# Patient Record
Sex: FEMALE | Race: WHITE | NOT HISPANIC OR LATINO | Employment: OTHER | URBAN - METROPOLITAN AREA
[De-identification: names, ages, dates, MRNs, and addresses within clinical notes are randomized per-mention and may not be internally consistent; named-entity substitution may affect disease eponyms.]

---

## 2018-01-09 ENCOUNTER — OFFICE VISIT (OUTPATIENT)
Dept: INTERNAL MEDICINE | Facility: CLINIC | Age: 43
End: 2018-01-09
Payer: MEDICARE

## 2018-01-09 VITALS
BODY MASS INDEX: 33.68 KG/M2 | HEART RATE: 96 BPM | DIASTOLIC BLOOD PRESSURE: 88 MMHG | OXYGEN SATURATION: 98 % | WEIGHT: 183 LBS | TEMPERATURE: 99 F | SYSTOLIC BLOOD PRESSURE: 120 MMHG | HEIGHT: 62 IN

## 2018-01-09 DIAGNOSIS — J10.1 INFLUENZA A: Primary | ICD-10-CM

## 2018-01-09 DIAGNOSIS — R68.89 FLU-LIKE SYMPTOMS: ICD-10-CM

## 2018-01-09 LAB
CTP QC/QA: YES
FLUAV AG NPH QL: POSITIVE
FLUBV AG NPH QL: NEGATIVE

## 2018-01-09 PROCEDURE — 87804 INFLUENZA ASSAY W/OPTIC: CPT | Mod: QW,S$GLB,, | Performed by: NURSE PRACTITIONER

## 2018-01-09 PROCEDURE — 99213 OFFICE O/P EST LOW 20 MIN: CPT | Mod: S$GLB,,, | Performed by: NURSE PRACTITIONER

## 2018-01-09 PROCEDURE — 99999 PR PBB SHADOW E&M-EST. PATIENT-LVL III: CPT | Mod: PBBFAC,,, | Performed by: NURSE PRACTITIONER

## 2018-01-09 RX ORDER — OSELTAMIVIR PHOSPHATE 75 MG/1
75 CAPSULE ORAL 2 TIMES DAILY
Qty: 10 CAPSULE | Refills: 0 | Status: SHIPPED | OUTPATIENT
Start: 2018-01-09 | End: 2018-01-14

## 2018-01-09 RX ORDER — BENZONATATE 100 MG/1
100 CAPSULE ORAL 3 TIMES DAILY PRN
Qty: 30 CAPSULE | Refills: 0 | Status: SHIPPED | OUTPATIENT
Start: 2018-01-09 | End: 2018-01-19

## 2018-01-09 NOTE — PROGRESS NOTES
INTERNAL MEDICINE PROGRESS/URGENT CARE NOTE    CHIEF COMPLAINT     Chief Complaint   Patient presents with    Sore Throat     all s/s began yesterday     Chest Congestion    Nasal Congestion    Generalized Body Aches    Chills    Cough       HPI     Jody Loza is a 42 y.o. female who presents for an urgent visit today.  She is an established pt of Dr. Judge     Here with c/o sore throat, nasal congestion, chills, body aches and cough starting yesterday   +ear pain  +sneezing  +cough  Reports sick contacts.     Treating with theraflu and other OTC meds.     Past Medical History:  Past Medical History:   Diagnosis Date    H/O albinism     Vision impairment     blindness/albinism        Home Medications:  Prior to Admission medications    Medication Sig Start Date End Date Taking? Authorizing Provider   sulindac (CLINORIL) 150 MG tablet Take 1 tablet (150 mg total) by mouth 2 (two) times daily. 9/1/15   Rai Jack DNP       Review of Systems:  Review of Systems   Constitutional: Positive for chills, fatigue and fever (subjective ).   HENT: Positive for congestion, ear pain, sinus pain, sinus pressure, sneezing and sore throat.    Respiratory: Positive for cough and wheezing. Negative for shortness of breath.    Cardiovascular: Negative for chest pain and palpitations.   Gastrointestinal: Positive for vomiting (1 episode this morning ). Negative for abdominal pain, constipation, diarrhea and nausea.   Musculoskeletal: Positive for arthralgias and myalgias.   Skin: Negative for rash.   Neurological: Negative for dizziness, light-headedness and headaches.       Health Maintainence:   Immunizations:  Health Maintenance       Date Due Completion Date    Pap Smear with HPV Cotest 07/28/1996 ---    Mammogram 07/28/2015 ---    Influenza Vaccine 08/01/2017 ---    TETANUS VACCINE 07/30/2024 7/30/2014           PHYSICAL EXAM     /88 (BP Location: Right arm, Patient Position: Sitting, BP Method: Large  "(Manual))   Pulse 96   Temp 98.8 °F (37.1 °C) (Oral)   Ht 5' 2" (1.575 m)   Wt 83 kg (182 lb 15.7 oz)   LMP 11/21/2017 (Approximate)   SpO2 98%   BMI 33.47 kg/m²     Physical Exam   Constitutional: She is oriented to person, place, and time. She appears well-developed and well-nourished.   HENT:   Head: Normocephalic.   Right Ear: Tympanic membrane and external ear normal.   Left Ear: External ear normal. Tympanic membrane is injected and bulging.   Nose: Mucosal edema and rhinorrhea present.   Mouth/Throat: Posterior oropharyngeal erythema present. No oropharyngeal exudate.   Eyes: Pupils are equal, round, and reactive to light.   Neck: Neck supple. No JVD present. No tracheal deviation present. No thyromegaly present.   Cardiovascular: Normal rate, regular rhythm, normal heart sounds and intact distal pulses.  Exam reveals no gallop and no friction rub.    No murmur heard.  Pulmonary/Chest: Effort normal and breath sounds normal. No respiratory distress. She has no wheezes. She has no rales.   Abdominal: Soft. Bowel sounds are normal. She exhibits no distension. There is no tenderness.   Musculoskeletal: Normal range of motion. She exhibits no edema or tenderness.   Lymphadenopathy:     She has no cervical adenopathy.   Neurological: She is alert and oriented to person, place, and time.   Skin: Skin is warm and dry. No rash noted.   Psychiatric: She has a normal mood and affect. Her behavior is normal.   Vitals reviewed.      LABS     No results found for: LABA1C, HGBA1C  CMP  Sodium   Date Value Ref Range Status   07/31/2014 138 136 - 145 mmol/L Final     Potassium   Date Value Ref Range Status   07/31/2014 4.3 3.5 - 5.1 mmol/L Final     Chloride   Date Value Ref Range Status   07/31/2014 107 95 - 110 mmol/L Final     CO2   Date Value Ref Range Status   07/31/2014 23 23 - 29 mmol/L Final     Glucose   Date Value Ref Range Status   07/31/2014 92 70 - 110 mg/dL Final     BUN, Bld   Date Value Ref Range " Status   07/31/2014 12 6 - 20 mg/dL Final     Creatinine   Date Value Ref Range Status   07/31/2014 0.8 0.5 - 1.4 mg/dL Final     Calcium   Date Value Ref Range Status   07/31/2014 9.0 8.7 - 10.5 mg/dL Final     Total Protein   Date Value Ref Range Status   07/31/2014 7.3 6.0 - 8.4 g/dL Final     Albumin   Date Value Ref Range Status   07/31/2014 3.9 3.5 - 5.2 g/dL Final     Total Bilirubin   Date Value Ref Range Status   07/31/2014 0.4 0.1 - 1.0 mg/dL Final     Comment:     For infants and newborns, interpretation of results should be based  on gestational age, weight and in agreement with clinical  observations.  Premature Infant recommended reference ranges:  Up to 24 hours.............<8.0 mg/dL  Up to 48 hours............<12.0 mg/dL  3-5 days..................<15.0 mg/dL  6-29 days.................<15.0 mg/dL       Alkaline Phosphatase   Date Value Ref Range Status   07/31/2014 48 (L) 55 - 135 U/L Final     AST   Date Value Ref Range Status   07/31/2014 16 10 - 40 U/L Final     ALT   Date Value Ref Range Status   07/31/2014 11 10 - 44 U/L Final     Anion Gap   Date Value Ref Range Status   07/31/2014 8 8 - 16 mmol/L Final     eGFR if    Date Value Ref Range Status   07/31/2014 >60.0 >60 mL/min/1.73 m^2 Final     eGFR if non    Date Value Ref Range Status   07/31/2014 >60.0 >60 mL/min/1.73 m^2 Final     Comment:     Calculation used to obtain the estimated glomerular filtration  rate (eGFR) is the CKD-EPI equation. Since race is unknown   in our information system, the eGFR values for   -American and Non--American patients are given   for each creatinine result.       Lab Results   Component Value Date    WBC 7.98 07/31/2014    HGB 13.4 07/31/2014    HCT 40.5 07/31/2014    MCV 92 07/31/2014     07/31/2014     Lab Results   Component Value Date    CHOL 159 07/31/2014     Lab Results   Component Value Date    HDL 51 07/31/2014     Lab Results   Component Value  Date    LDLCALC 93.4 07/31/2014     Lab Results   Component Value Date    TRIG 73 07/31/2014     Lab Results   Component Value Date    CHOLHDL 32.1 07/31/2014     Lab Results   Component Value Date    TSH 3.899 10/13/2014       ASSESSMENT/PLAN     Jody Loza is a 42 y.o. female with  Past Medical History:   Diagnosis Date    H/O albinism     Vision impairment     blindness/albinism      Influenza A- poct flu A+. Will start tamiflu bid x 5 days. Cough meds as needed.   -     oseltamivir (TAMIFLU) 75 MG capsule; Take 1 capsule (75 mg total) by mouth 2 (two) times daily.  Dispense: 10 capsule; Refill: 0  -     benzonatate (TESSALON) 100 MG capsule; Take 1 capsule (100 mg total) by mouth 3 (three) times daily as needed.  Dispense: 30 capsule; Refill: 0    Flu-like symptoms  -     POCT Influenza A/B          Follow up as needed     Patient education provided from [facility]. Patient was counseled on when and how to seek emergent care.       Allyssa GOODE, LEIDA, FNP-c   Department of Internal Medicine - [facility]  4:46 PM

## 2018-03-03 ENCOUNTER — HOSPITAL ENCOUNTER (INPATIENT)
Facility: HOSPITAL | Age: 43
LOS: 3 days | Discharge: HOME | DRG: 378 | End: 2018-03-06
Attending: EMERGENCY MEDICINE | Admitting: HOSPITALIST
Payer: MEDICARE

## 2018-03-03 DIAGNOSIS — R11.10 EMESIS: ICD-10-CM

## 2018-03-03 DIAGNOSIS — K92.2 ACUTE UPPER GI BLEED: Primary | ICD-10-CM

## 2018-03-03 DIAGNOSIS — D64.9 SYMPTOMATIC ANEMIA: ICD-10-CM

## 2018-03-03 DIAGNOSIS — R55 SYNCOPE, UNSPECIFIED SYNCOPE TYPE: ICD-10-CM

## 2018-03-03 PROBLEM — D62 ANEMIA DUE TO ACUTE BLOOD LOSS: Status: ACTIVE | Noted: 2018-03-03

## 2018-03-03 LAB
ABO + RH BLD: NORMAL
ALBUMIN SERPL BCP-MCNC: 3.2 G/DL
ALP SERPL-CCNC: 41 U/L
ALT SERPL W/O P-5'-P-CCNC: 11 U/L
ANION GAP SERPL CALC-SCNC: 8 MMOL/L
AST SERPL-CCNC: 12 U/L
B-HCG UR QL: NEGATIVE
BACTERIA #/AREA URNS HPF: NORMAL /HPF
BASOPHILS # BLD AUTO: 0.02 K/UL
BASOPHILS NFR BLD: 0.2 %
BILIRUB SERPL-MCNC: 0.4 MG/DL
BILIRUB UR QL STRIP: NEGATIVE
BLD GP AB SCN CELLS X3 SERPL QL: NORMAL
BUN SERPL-MCNC: 33 MG/DL
CALCIUM SERPL-MCNC: 8.1 MG/DL
CHLORIDE SERPL-SCNC: 108 MMOL/L
CLARITY UR: CLEAR
CO2 SERPL-SCNC: 24 MMOL/L
COLOR UR: YELLOW
CREAT SERPL-MCNC: 0.8 MG/DL
CTP QC/QA: YES
DIFFERENTIAL METHOD: ABNORMAL
EOSINOPHIL # BLD AUTO: 0.1 K/UL
EOSINOPHIL NFR BLD: 0.4 %
ERYTHROCYTE [DISTWIDTH] IN BLOOD BY AUTOMATED COUNT: 12 %
EST. GFR  (AFRICAN AMERICAN): >60 ML/MIN/1.73 M^2
EST. GFR  (NON AFRICAN AMERICAN): >60 ML/MIN/1.73 M^2
GLUCOSE SERPL-MCNC: 171 MG/DL
GLUCOSE UR QL STRIP: NEGATIVE
HCT VFR BLD AUTO: 20.4 %
HCT VFR BLD AUTO: 25.8 %
HCT VFR BLD AUTO: 28.5 %
HGB BLD-MCNC: 7.2 G/DL
HGB BLD-MCNC: 8.9 G/DL
HGB BLD-MCNC: 9.7 G/DL
HGB UR QL STRIP: ABNORMAL
INR PPP: 1.1
KETONES UR QL STRIP: NEGATIVE
LACTATE SERPL-SCNC: 2.4 MMOL/L
LEUKOCYTE ESTERASE UR QL STRIP: NEGATIVE
LIPASE SERPL-CCNC: 39 U/L
LYMPHOCYTES # BLD AUTO: 2 K/UL
LYMPHOCYTES NFR BLD: 17.8 %
MCH RBC QN AUTO: 30.7 PG
MCHC RBC AUTO-ENTMCNC: 34 G/DL
MCV RBC AUTO: 90 FL
MICROSCOPIC COMMENT: NORMAL
MONOCYTES # BLD AUTO: 0.6 K/UL
MONOCYTES NFR BLD: 5.1 %
NEUTROPHILS # BLD AUTO: 8.5 K/UL
NEUTROPHILS NFR BLD: 76.3 %
NITRITE UR QL STRIP: NEGATIVE
PH UR STRIP: 7 [PH] (ref 5–8)
PLATELET # BLD AUTO: 313 K/UL
PMV BLD AUTO: 9 FL
POCT GLUCOSE: 173 MG/DL (ref 70–110)
POTASSIUM SERPL-SCNC: 4.3 MMOL/L
PROT SERPL-MCNC: 5.8 G/DL
PROT UR QL STRIP: NEGATIVE
PROTHROMBIN TIME: 11.7 SEC
RBC # BLD AUTO: 3.16 M/UL
RBC #/AREA URNS HPF: 3 /HPF (ref 0–4)
SODIUM SERPL-SCNC: 140 MMOL/L
SP GR UR STRIP: 1.01 (ref 1–1.03)
SQUAMOUS #/AREA URNS HPF: 5 /HPF
TROPONIN I SERPL DL<=0.01 NG/ML-MCNC: <0.006 NG/ML
URN SPEC COLLECT METH UR: ABNORMAL
UROBILINOGEN UR STRIP-ACNC: NEGATIVE EU/DL
WBC # BLD AUTO: 11.19 K/UL
WBC #/AREA URNS HPF: 4 /HPF (ref 0–5)

## 2018-03-03 PROCEDURE — 25000003 PHARM REV CODE 250: Performed by: NURSE PRACTITIONER

## 2018-03-03 PROCEDURE — 81000 URINALYSIS NONAUTO W/SCOPE: CPT

## 2018-03-03 PROCEDURE — 63600175 PHARM REV CODE 636 W HCPCS: Performed by: EMERGENCY MEDICINE

## 2018-03-03 PROCEDURE — 25000003 PHARM REV CODE 250: Performed by: EMERGENCY MEDICINE

## 2018-03-03 PROCEDURE — C9113 INJ PANTOPRAZOLE SODIUM, VIA: HCPCS | Performed by: EMERGENCY MEDICINE

## 2018-03-03 PROCEDURE — 85025 COMPLETE CBC W/AUTO DIFF WBC: CPT

## 2018-03-03 PROCEDURE — 86850 RBC ANTIBODY SCREEN: CPT

## 2018-03-03 PROCEDURE — 83690 ASSAY OF LIPASE: CPT

## 2018-03-03 PROCEDURE — 21400001 HC TELEMETRY ROOM

## 2018-03-03 PROCEDURE — S0028 INJECTION, FAMOTIDINE, 20 MG: HCPCS | Performed by: EMERGENCY MEDICINE

## 2018-03-03 PROCEDURE — 25500020 PHARM REV CODE 255: Performed by: EMERGENCY MEDICINE

## 2018-03-03 PROCEDURE — 96361 HYDRATE IV INFUSION ADD-ON: CPT

## 2018-03-03 PROCEDURE — 36415 COLL VENOUS BLD VENIPUNCTURE: CPT

## 2018-03-03 PROCEDURE — 99285 EMERGENCY DEPT VISIT HI MDM: CPT | Mod: 25

## 2018-03-03 PROCEDURE — 80053 COMPREHEN METABOLIC PANEL: CPT

## 2018-03-03 PROCEDURE — 85018 HEMOGLOBIN: CPT | Mod: 91

## 2018-03-03 PROCEDURE — 25000003 PHARM REV CODE 250: Performed by: INTERNAL MEDICINE

## 2018-03-03 PROCEDURE — 93005 ELECTROCARDIOGRAM TRACING: CPT

## 2018-03-03 PROCEDURE — 96374 THER/PROPH/DIAG INJ IV PUSH: CPT

## 2018-03-03 PROCEDURE — 81025 URINE PREGNANCY TEST: CPT | Performed by: EMERGENCY MEDICINE

## 2018-03-03 PROCEDURE — 85610 PROTHROMBIN TIME: CPT

## 2018-03-03 PROCEDURE — 84484 ASSAY OF TROPONIN QUANT: CPT

## 2018-03-03 PROCEDURE — 83605 ASSAY OF LACTIC ACID: CPT

## 2018-03-03 PROCEDURE — 96375 TX/PRO/DX INJ NEW DRUG ADDON: CPT

## 2018-03-03 PROCEDURE — 85014 HEMATOCRIT: CPT

## 2018-03-03 PROCEDURE — 82962 GLUCOSE BLOOD TEST: CPT

## 2018-03-03 PROCEDURE — 86920 COMPATIBILITY TEST SPIN: CPT

## 2018-03-03 PROCEDURE — C9113 INJ PANTOPRAZOLE SODIUM, VIA: HCPCS | Performed by: INTERNAL MEDICINE

## 2018-03-03 PROCEDURE — 96376 TX/PRO/DX INJ SAME DRUG ADON: CPT

## 2018-03-03 PROCEDURE — 63600175 PHARM REV CODE 636 W HCPCS: Performed by: INTERNAL MEDICINE

## 2018-03-03 PROCEDURE — 93010 ELECTROCARDIOGRAM REPORT: CPT | Mod: ,,, | Performed by: INTERNAL MEDICINE

## 2018-03-03 RX ORDER — FAMOTIDINE 10 MG/ML
40 INJECTION INTRAVENOUS
Status: COMPLETED | OUTPATIENT
Start: 2018-03-03 | End: 2018-03-03

## 2018-03-03 RX ORDER — ONDANSETRON 2 MG/ML
4 INJECTION INTRAMUSCULAR; INTRAVENOUS EVERY 8 HOURS PRN
Status: DISCONTINUED | OUTPATIENT
Start: 2018-03-03 | End: 2018-03-06 | Stop reason: HOSPADM

## 2018-03-03 RX ORDER — PANTOPRAZOLE SODIUM 40 MG/10ML
80 INJECTION, POWDER, LYOPHILIZED, FOR SOLUTION INTRAVENOUS
Status: COMPLETED | OUTPATIENT
Start: 2018-03-03 | End: 2018-03-03

## 2018-03-03 RX ORDER — SODIUM CHLORIDE 9 MG/ML
INJECTION, SOLUTION INTRAVENOUS CONTINUOUS
Status: DISCONTINUED | OUTPATIENT
Start: 2018-03-03 | End: 2018-03-05

## 2018-03-03 RX ORDER — ONDANSETRON 2 MG/ML
4 INJECTION INTRAMUSCULAR; INTRAVENOUS
Status: COMPLETED | OUTPATIENT
Start: 2018-03-03 | End: 2018-03-03

## 2018-03-03 RX ORDER — BUTALBITAL, ACETAMINOPHEN AND CAFFEINE 50; 325; 40 MG/1; MG/1; MG/1
1 TABLET ORAL EVERY 4 HOURS PRN
Status: DISCONTINUED | OUTPATIENT
Start: 2018-03-03 | End: 2018-03-06 | Stop reason: HOSPADM

## 2018-03-03 RX ORDER — SODIUM CHLORIDE 0.9 % (FLUSH) 0.9 %
3 SYRINGE (ML) INJECTION
Status: DISCONTINUED | OUTPATIENT
Start: 2018-03-03 | End: 2018-03-06 | Stop reason: HOSPADM

## 2018-03-03 RX ORDER — SODIUM CHLORIDE 9 MG/ML
INJECTION, SOLUTION INTRAVENOUS CONTINUOUS
Status: DISCONTINUED | OUTPATIENT
Start: 2018-03-03 | End: 2018-03-03

## 2018-03-03 RX ADMIN — SODIUM CHLORIDE: 0.9 INJECTION, SOLUTION INTRAVENOUS at 02:03

## 2018-03-03 RX ADMIN — SODIUM CHLORIDE 1000 ML: 0.9 INJECTION, SOLUTION INTRAVENOUS at 09:03

## 2018-03-03 RX ADMIN — DEXTROSE 8 MG/HR: 50 INJECTION, SOLUTION INTRAVENOUS at 05:03

## 2018-03-03 RX ADMIN — BUTALBITAL, ACETAMINOPHEN AND CAFFEINE 1 TABLET: 50; 325; 40 TABLET ORAL at 05:03

## 2018-03-03 RX ADMIN — IOHEXOL 75 ML: 350 INJECTION, SOLUTION INTRAVENOUS at 10:03

## 2018-03-03 RX ADMIN — BUTALBITAL, ACETAMINOPHEN AND CAFFEINE 1 TABLET: 50; 325; 40 TABLET ORAL at 09:03

## 2018-03-03 RX ADMIN — SODIUM CHLORIDE 1000 ML: 0.9 INJECTION, SOLUTION INTRAVENOUS at 07:03

## 2018-03-03 RX ADMIN — DEXTROSE 8 MG/HR: 50 INJECTION, SOLUTION INTRAVENOUS at 09:03

## 2018-03-03 RX ADMIN — DEXTROSE 8 MG/HR: 50 INJECTION, SOLUTION INTRAVENOUS at 02:03

## 2018-03-03 RX ADMIN — ONDANSETRON HYDROCHLORIDE 4 MG: 2 INJECTION, SOLUTION INTRAMUSCULAR; INTRAVENOUS at 09:03

## 2018-03-03 RX ADMIN — SODIUM CHLORIDE 1000 ML: 0.9 INJECTION, SOLUTION INTRAVENOUS at 08:03

## 2018-03-03 RX ADMIN — FAMOTIDINE 40 MG: 10 INJECTION INTRAVENOUS at 09:03

## 2018-03-03 RX ADMIN — PANTOPRAZOLE SODIUM 80 MG: 40 INJECTION, POWDER, FOR SOLUTION INTRAVENOUS at 09:03

## 2018-03-03 RX ADMIN — SODIUM CHLORIDE: 0.9 INJECTION, SOLUTION INTRAVENOUS at 05:03

## 2018-03-03 NOTE — NURSING
Patient arrived to unit via stretcher. No complaints, no acute distress noted. Assisted to bed. Vitals and tele monitoring in progress

## 2018-03-03 NOTE — HPI
"Jody Loza is a 42 y.o. female with HX albinism who presents for evaluation of acute onset, painless, dark, coffee ground-like emesis 4+ that started around 2 am in the morning with associated diffuse acute abdominal sharp crampy pains, and 3-4 episodes of loose stool - no noticeable blood but "I didn't look back". NVD have resolved - abdominal pain improved. Patient reports eating sushi at the [employer] [place] [employer] bar with about 6 beers around 2 pm that day. She drinks about 1 beer daily but endorses that she has been drinking a little more recently due to stress from [place] season. Additionally, endorses consistent use of BC powders for Headache. She currently has a headache. Lastly, she reports chronic anemia and history of 1 blood transfusion years ago after a minor surgery. Regular menstrual cycles that last about 3 days. Denies recent sick contacts or travel, fever, dysuria, or trauma.     Patient found to be anemic 8.9/25.8 at the time of presentation; AST/ALT/Lipase WNL - lactic acid level 2.4 - CT abdomen unrevealing  "

## 2018-03-03 NOTE — CONSULTS
Gastroenterology    CC: Coffee ground emesis    HPI 42 y.o. female with acute onset, recurrent, painless, moderate severity, dark, coffee ground-like emesis yesterday associated with soft stool with no noticeable blood.  No recent sick contacts or travel.  No fever.  + consistent BC powder use for HA recently.  Some vague abd soreness from the emesis, but no real pain.  No jaundice or dysphagia.  No ulcer in past.    No stools thus far today and nausea resolved.  Hungry      Past Medical History:   Diagnosis Date    H/O albinism     Vision impairment     blindness/albinism          Past Surgical History:   Procedure Laterality Date    benign ovarian cyst      TONSILLECTOMY         Social History  Social History   Substance Use Topics    Smoking status: Never Smoker    Smokeless tobacco: Never Used    Alcohol use Yes      Comment: Socially, 6 beers weekly   No illicits    Family History   Problem Relation Age of Onset    Fibrocystic breast disease Mother     Hyperlipidemia Mother     Hyperlipidemia Father     Polycythemia Maternal Aunt     Heart disease Maternal Grandfather     Hypertension Maternal Grandfather     Heart disease Paternal Grandfather     Hypertension Paternal Grandfather        Review of Systems  General ROS: negative for chills, fever or weight loss  Psychological ROS: negative for hallucination, depression or suicidal ideation  Ophthalmic ROS: negative for blurry vision, photophobia or eye pain  ENT ROS: negative for epistaxis, sore throat or rhinorrhea  Respiratory ROS: no cough, shortness of breath, or wheezing  Cardiovascular ROS: no chest pain or dyspnea on exertion  Gastrointestinal ROS: no abdominal pain, change in bowel habits, or black/ bloody stools  Genito-Urinary ROS: no dysuria, trouble voiding, or hematuria  Musculoskeletal ROS: negative for gait disturbance or muscular weakness  Neurological ROS: no syncope or seizures; no ataxia  Dermatological ROS: negative for  "pruritis, rash and jaundice    Physical Examination  BP (!) 107/52   Pulse 88   Temp 98.6 °F (37 °C) (Oral)   Resp 13   Ht 5' 2" (1.575 m)   Wt 79.4 kg (175 lb)   LMP 02/22/2018 (Approximate)   SpO2 100%   Breastfeeding? No   BMI 32.01 kg/m²   General appearance: alert, cooperative, no distress  HENT: Normocephalic, atraumatic, neck symmetrical, no nasal discharge   Eyes: conjunctivae/corneas clear, PERRL, EOM's intact  Lungs: clear to auscultation bilaterally, no dullness to percussion bilaterally  Heart: regular rate and rhythm without rub; no displacement of the PMI   Abdomen: soft, non-tender; bowel sounds normoactive; no organomegaly  Extremities: extremities symmetric; no clubbing, cyanosis, or edema  Integument: Skin color, texture, turgor normal; no rashes; hair distrubution normal  Neurologic: Alert and oriented X 3, MAEW  Psychiatric: no pressured speech; normal affect; no evidence of impaired cognition     Labs:  Hb 9    Assessment:    Acute onset coffee ground emesis yest, though nausea currently resolved and no stools thus far today.  BP improved (at baseline for her).       Plan:   - Clears ok  - Serial H/H  - PPI ggt  - EGD Monday, unless needed more urgently      "

## 2018-03-03 NOTE — ASSESSMENT & PLAN NOTE
Trended downward in past months - etiology unclear  Iron studies  GI following - appreciate recs - anticipate EGD on Monday

## 2018-03-03 NOTE — ED TRIAGE NOTES
"Pt to the ED with c/o LOC, nausea, vomiting, and diarrhea. Pt reports syncope episode after vomiting. Pt denies hitting head, however fell against wall. Pt reports lower ABD pain. Pt reports eating sushi last night, x3 episodes of vomiting occurred. Pt reports weakness and dizziness. Pt palxced on cardaic monitor. No acute distress noted.  at bedside, reports possible blood in emesis, unsure due to red food eaten.  reports "appears like blood clots."  "

## 2018-03-03 NOTE — H&P
"[facility] Hospital Medicine  History & Physical    Patient Name: Jody Loza  MRN: [id number]  Admission Date: 3/3/2018  Attending Physician: Maame Ruiz MD   Primary Care Provider: Elvia Judge MD         Patient information was obtained from patient and ER records.     Subjective:     Principal Problem:Acute upper GI bleed    Chief Complaint:   Chief Complaint   Patient presents with    Emesis     Pt reports vomiting and diarrhea that started around 4 am.         HPI: Jody Loza is a 42 y.o. female with HX albinism who presents for evaluation of acute onset, painless, dark, coffee ground-like emesis 4+ that started around 2 am in the morning with associated diffuse acute abdominal sharp crampy pains, and 3-4 episodes of loose stool - no noticeable blood but "I didn't look back". NVD have resolved - abdominal pain improved. Patient reports eating sushi at the [employer] bar with about 6 beers around 2 pm that day. She drinks about 1 beer daily but endorses that she has been drinking a little more recently due to stress from [place] season. Additionally, endorses consistent use of BC powders for Headache. She currently has a headache. Lastly, she reports chronic anemia and history of 1 blood transfusion years ago after a minor surgery. Regular menstrual cycles that last about 3 days. Denies recent sick contacts or travel, fever, dysuria, or trauma.     Patient found to be anemic 8.9/25.8 at the time of presentation; AST/ALT/Lipase WNL - lactic acid level 2.4 - CT abdomen unrevealing    Past Medical History:   Diagnosis Date    H/O albinism     Vision impairment     blindness/albinism        Past Surgical History:   Procedure Laterality Date    benign ovarian cyst      TONSILLECTOMY         Review of patient's allergies indicates:  No Known Allergies    No current facility-administered medications on file prior to encounter.      Current Outpatient Prescriptions on File Prior to " Encounter   Medication Sig    [DISCONTINUED] sulindac (CLINORIL) 150 MG tablet Take 1 tablet (150 mg total) by mouth 2 (two) times daily.     Family History     Problem Relation (Age of Onset)    Fibrocystic breast disease Mother    Heart disease Maternal Grandfather, Paternal Grandfather    Hyperlipidemia Mother, Father    Hypertension Maternal Grandfather, Paternal Grandfather    Polycythemia Maternal Aunt        Social History Main Topics    Smoking status: Never Smoker    Smokeless tobacco: Never Used    Alcohol use Yes      Comment: Socially, 6 beers weekly    Drug use: No    Sexual activity: Not on file     Review of Systems   Constitutional: Negative for appetite change, chills, diaphoresis and fever.   HENT: Negative for congestion, hearing loss, sore throat, tinnitus and trouble swallowing.    Eyes: Negative for photophobia, discharge, itching and visual disturbance.   Respiratory: Negative for apnea, cough, wheezing and stridor.    Cardiovascular: Negative for chest pain, palpitations and leg swelling.   Gastrointestinal: Positive for nausea and vomiting. Negative for abdominal distention, abdominal pain, anal bleeding, blood in stool, constipation and diarrhea.   Endocrine: Negative for polydipsia, polyphagia and polyuria.   Genitourinary: Negative for difficulty urinating, dysuria, flank pain and frequency.   Musculoskeletal: Negative for arthralgias, joint swelling and neck stiffness.   Skin: Negative for color change, rash and wound.   Neurological: Negative for dizziness, tremors, seizures, light-headedness, numbness and headaches.   Hematological: Negative for adenopathy.   Psychiatric/Behavioral: Negative for hallucinations and self-injury.     Objective:     Vital Signs (Most Recent):  Temp: 98.6 °F (37 °C) (03/03/18 1219)  Pulse: 88 (03/03/18 1357)  Resp: 18 (03/03/18 1357)  BP: (!) 103/59 (03/03/18 1357)  SpO2: 100 % (03/03/18 1357) Vital Signs (24h Range):  Temp:  [98.1 °F (36.7 °C)-98.6  °F (37 °C)] 98.6 °F (37 °C)  Pulse:  [70-90] 88  Resp:  [11-18] 18  SpO2:  [94 %-100 %] 100 %  BP: ()/(37-59) 103/59     Weight: 85.5 kg (188 lb 7.9 oz)  Body mass index is 34.48 kg/m².    Physical Exam   Constitutional: She is oriented to person, place, and time. She appears well-developed and well-nourished. She is cooperative.   HENT:   Head: Normocephalic and atraumatic.   Eyes: Conjunctivae, EOM and lids are normal. Pupils are equal, round, and reactive to light.   Neck: Normal range of motion and full passive range of motion without pain. Neck supple. No JVD present. No edema present. No thyroid mass present.   Cardiovascular: Normal rate, S1 normal, S2 normal and intact distal pulses.    No murmur heard.  Pulmonary/Chest: Effort normal.   Abdominal: Soft. Bowel sounds are normal. She exhibits no distension and no abdominal bruit. There is no splenomegaly or hepatomegaly. There is no tenderness. There is no CVA tenderness.   Musculoskeletal: Normal range of motion.   Lymphadenopathy:     She has no cervical adenopathy.     She has no axillary adenopathy.   Neurological: She is alert and oriented to person, place, and time. She has normal reflexes. She displays no tremor. She displays no seizure activity.   Skin: Skin is warm, dry and intact.   Psychiatric: She has a normal mood and affect. Her speech is normal. Thought content normal. Cognition and memory are normal.         CRANIAL NERVES     CN III, IV, VI   Pupils are equal, round, and reactive to light.  Extraocular motions are normal.        Significant Labs:   CBC:   Recent Labs  Lab 03/03/18  0747 03/03/18  1353   WBC 11.19  --    HGB 9.7* 8.9*   HCT 28.5* 25.8*     --      CMP:   Recent Labs  Lab 03/03/18  0747      K 4.3      CO2 24   *   BUN 33*   CREATININE 0.8   CALCIUM 8.1*   PROT 5.8*   ALBUMIN 3.2*   BILITOT 0.4   ALKPHOS 41*   AST 12   ALT 11   ANIONGAP 8   EGFRNONAA >60     Cardiac Markers: No results for  input(s): CKMB, MYOGLOBIN, BNP, TROPISTAT in the last 48 hours.  Coagulation:   Recent Labs  Lab 03/03/18  0747   INR 1.1     Lactic Acid:   Recent Labs  Lab 03/03/18  0755   LACTATE 2.4*     Lipase:   Recent Labs  Lab 03/03/18  0747   LIPASE 39     Lipid Panel: No results for input(s): CHOL, HDL, LDLCALC, TRIG, CHOLHDL in the last 48 hours.  Magnesium: No results for input(s): MG in the last 48 hours.  Troponin:   Recent Labs  Lab 03/03/18  0747   TROPONINI <0.006     TSH: No results for input(s): TSH in the last 4320 hours.  Urine Culture: No results for input(s): LABURIN in the last 48 hours.  Urine Studies:   Recent Labs  Lab 03/03/18  0922   COLORU Yellow   APPEARANCEUA Clear   PHUR 7.0   SPECGRAV 1.015   PROTEINUA Negative   GLUCUA Negative   KETONESU Negative   BILIRUBINUA Negative   OCCULTUA 3+*   NITRITE Negative   UROBILINOGEN Negative   LEUKOCYTESUR Negative   RBCUA 3   WBCUA 4   BACTERIA Rare   SQUAMEPITHEL 5       Significant Imaging:   Imaging Results          CT Abdomen Pelvis With Contrast (Final result)  Result time 03/03/18 10:23:49    Final result by Kel Gaston MD (03/03/18 10:23:49)                 Impression:      No acute abnormality.      Electronically signed by: KEL GASTON MD  Date:     03/03/18  Time:    10:23              Narrative:    CT abdomen and pelvis with contrast    Technique: Helical CT images. IV Contrast: 75 cc Omnipaque 350    Comparison: None    Findings:  Visualized lower chest is unremarkable.    The liver, gallbladder, pancreas, spleen, and adrenals are unremarkable. No bowel wall thickening or dilation. Normal appendix. No ascites.    The kidneys and ureters are unremarkable. The urinary bladder, and uterus and adnexal structures are unremarkable.    No adenopathy.    Body wall soft tissues are unremarkable.    No acute bone abnormality.                          \    Assessment/Plan:     * Acute upper GI bleed    Patient reports consistent use of BC powders for  HA/frequent alcohol use/raw seafood ingestion w/i past 24 hours - Anemia 8.9/25.8; appears to be trending downward  - serial H/Hs -  GI consulted and following PPI ggt infusing - no plan for EGD until Monday - CT abdomen unrevealing; alcohol gastritis???acute hemorrhagic gastroenteritis???gastric ulcer??  -Trend H/H  -T&S    Results for SERA PHILLIPS (MRN [id number]) as of 3/3/2018 15:10   Ref. Range 7/31/2014 08:05 3/3/2018 07:47   Hemoglobin Latest Ref Range: 12.0 - 16.0 g/dL 13.4 9.7 (L)   Hematocrit Latest Ref Range: 37.0 - 48.5 % 40.5 28.5 (L)           Anemia due to acute blood loss    Trended downward in past months - etiology unclear  Iron studies  GI following - appreciate recs - anticipate EGD on Monday          Vision impairment    L eye strabismus - chronic - no new issues            Defer Lovenox 2/2 bleeding risk  VTE Risk Mitigation         Ordered     Medium Risk of VTE  Once      03/03/18 1323     Place PAZ hose  Until discontinued      03/03/18 1323     Place sequential compression device  Until discontinued      03/03/18 1323             LEIDA Pope, FNP-C  Hospitalist - Department of Hospital Medicine  [address] [place] [address]  Office [phone]; Pager [phone]

## 2018-03-03 NOTE — PLAN OF CARE
Problem: Fall Risk (Adult)  Intervention: Reduce Risk/Promote Restraint Free Environment   18 173   Safety Interventions   Environmental Safety Modification assistive device/personal items within reach;clutter free environment maintained;lighting adjusted;room near unit station;room organization consistent   Prevent  Drop/Fall   Safety/Security Measures bed alarm set     Intervention: Review Medications/Identify Contributors to Fall Risk   18   Safety Interventions   Medication Review/Management medications reviewed     Intervention: Patient Rounds   18 170   Safety Interventions   Patient Rounds bed in low position;bed wheels locked;call light in reach;clutter free environment maintained;ID band on;placement of personal items at bedside;toileting offered;visualized patient     Intervention: Safety Promotion/Fall Prevention   18 170   Safety Interventions   Safety Promotion/Fall Prevention assistive device/personal item within reach;bed alarm set;commode/urinal/bedpan at bedside;lighting adjusted;medications reviewed;instructed to call staff for mobility;nonskid shoes/socks when out of bed       Goal: Identify Related Risk Factors and Signs and Symptoms  Related risk factors and signs and symptoms are identified upon initiation of Human Response Clinical Practice Guideline (CPG)   Outcome: Ongoing (interventions implemented as appropriate)   18   Fall Risk   Related Risk Factors (Fall Risk) age-related changes;fatigue/slow reaction;gait/mobility problems;environment unfamiliar;sleep pattern alteration   Signs and Symptoms (Fall Risk) presence of risk factors     Goal: Absence of Falls  Patient will demonstrate the desired outcomes by discharge/transition of care.   Outcome: Ongoing (interventions implemented as appropriate)   18   Fall Risk (Adult)   Absence of Falls making progress toward outcome

## 2018-03-03 NOTE — ASSESSMENT & PLAN NOTE
Patient reports consistent use of BC powders for HA/frequent alcohol use/raw seafood ingestion w/i past 24 hours - Anemia 8.9/25.8; appears to be trending downward  - serial H/Hs -  GI consulted and following PPI ggt infusing - no plan for EGD until Monday - CT abdomen unrevealing; alcohol gastritis???acute hemorrhagic gastroenteritis???gastric ulcer??  -Trend H/H  -T&S    Results for SERA PHILLIPS (MRN [id number]) as of 3/3/2018 15:10   Ref. Range 7/31/2014 08:05 3/3/2018 07:47   Hemoglobin Latest Ref Range: 12.0 - 16.0 g/dL 13.4 9.7 (L)   Hematocrit Latest Ref Range: 37.0 - 48.5 % 40.5 28.5 (L)

## 2018-03-03 NOTE — SUBJECTIVE & OBJECTIVE
Past Medical History:   Diagnosis Date    H/O albinism     Vision impairment     blindness/albinism        Past Surgical History:   Procedure Laterality Date    benign ovarian cyst      TONSILLECTOMY         Review of patient's allergies indicates:  No Known Allergies    No current facility-administered medications on file prior to encounter.      Current Outpatient Prescriptions on File Prior to Encounter   Medication Sig    [DISCONTINUED] sulindac (CLINORIL) 150 MG tablet Take 1 tablet (150 mg total) by mouth 2 (two) times daily.     Family History     Problem Relation (Age of Onset)    Fibrocystic breast disease Mother    Heart disease Maternal Grandfather, Paternal Grandfather    Hyperlipidemia Mother, Father    Hypertension Maternal Grandfather, Paternal Grandfather    Polycythemia Maternal Aunt        Social History Main Topics    Smoking status: Never Smoker    Smokeless tobacco: Never Used    Alcohol use Yes      Comment: Socially, 6 beers weekly    Drug use: No    Sexual activity: Not on file     Review of Systems   Constitutional: Negative for appetite change, chills, diaphoresis and fever.   HENT: Negative for congestion, hearing loss, sore throat, tinnitus and trouble swallowing.    Eyes: Negative for photophobia, discharge, itching and visual disturbance.   Respiratory: Negative for apnea, cough, wheezing and stridor.    Cardiovascular: Negative for chest pain, palpitations and leg swelling.   Gastrointestinal: Positive for nausea and vomiting. Negative for abdominal distention, abdominal pain, anal bleeding, blood in stool, constipation and diarrhea.   Endocrine: Negative for polydipsia, polyphagia and polyuria.   Genitourinary: Negative for difficulty urinating, dysuria, flank pain and frequency.   Musculoskeletal: Negative for arthralgias, joint swelling and neck stiffness.   Skin: Negative for color change, rash and wound.   Neurological: Negative for dizziness, tremors, seizures,  light-headedness, numbness and headaches.   Hematological: Negative for adenopathy.   Psychiatric/Behavioral: Negative for hallucinations and self-injury.     Objective:     Vital Signs (Most Recent):  Temp: 98.6 °F (37 °C) (03/03/18 1219)  Pulse: 88 (03/03/18 1357)  Resp: 18 (03/03/18 1357)  BP: (!) 103/59 (03/03/18 1357)  SpO2: 100 % (03/03/18 1357) Vital Signs (24h Range):  Temp:  [98.1 °F (36.7 °C)-98.6 °F (37 °C)] 98.6 °F (37 °C)  Pulse:  [70-90] 88  Resp:  [11-18] 18  SpO2:  [94 %-100 %] 100 %  BP: ()/(37-59) 103/59     Weight: 85.5 kg (188 lb 7.9 oz)  Body mass index is 34.48 kg/m².    Physical Exam   Constitutional: She is oriented to person, place, and time. She appears well-developed and well-nourished. She is cooperative.   HENT:   Head: Normocephalic and atraumatic.   Eyes: Conjunctivae, EOM and lids are normal. Pupils are equal, round, and reactive to light.   Neck: Normal range of motion and full passive range of motion without pain. Neck supple. No JVD present. No edema present. No thyroid mass present.   Cardiovascular: Normal rate, S1 normal, S2 normal and intact distal pulses.    No murmur heard.  Pulmonary/Chest: Effort normal.   Abdominal: Soft. Bowel sounds are normal. She exhibits no distension and no abdominal bruit. There is no splenomegaly or hepatomegaly. There is no tenderness. There is no CVA tenderness.   Musculoskeletal: Normal range of motion.   Lymphadenopathy:     She has no cervical adenopathy.     She has no axillary adenopathy.   Neurological: She is alert and oriented to person, place, and time. She has normal reflexes. She displays no tremor. She displays no seizure activity.   Skin: Skin is warm, dry and intact.   Psychiatric: She has a normal mood and affect. Her speech is normal. Thought content normal. Cognition and memory are normal.         CRANIAL NERVES     CN III, IV, VI   Pupils are equal, round, and reactive to light.  Extraocular motions are normal.         Significant Labs:   CBC:   Recent Labs  Lab 03/03/18  0747 03/03/18  1353   WBC 11.19  --    HGB 9.7* 8.9*   HCT 28.5* 25.8*     --      CMP:   Recent Labs  Lab 03/03/18  0747      K 4.3      CO2 24   *   BUN 33*   CREATININE 0.8   CALCIUM 8.1*   PROT 5.8*   ALBUMIN 3.2*   BILITOT 0.4   ALKPHOS 41*   AST 12   ALT 11   ANIONGAP 8   EGFRNONAA >60     Cardiac Markers: No results for input(s): CKMB, MYOGLOBIN, BNP, TROPISTAT in the last 48 hours.  Coagulation:   Recent Labs  Lab 03/03/18  0747   INR 1.1     Lactic Acid:   Recent Labs  Lab 03/03/18  0755   LACTATE 2.4*     Lipase:   Recent Labs  Lab 03/03/18  0747   LIPASE 39     Lipid Panel: No results for input(s): CHOL, HDL, LDLCALC, TRIG, CHOLHDL in the last 48 hours.  Magnesium: No results for input(s): MG in the last 48 hours.  Troponin:   Recent Labs  Lab 03/03/18  0747   TROPONINI <0.006     TSH: No results for input(s): TSH in the last 4320 hours.  Urine Culture: No results for input(s): LABURIN in the last 48 hours.  Urine Studies:   Recent Labs  Lab 03/03/18  0922   COLORU Yellow   APPEARANCEUA Clear   PHUR 7.0   SPECGRAV 1.015   PROTEINUA Negative   GLUCUA Negative   KETONESU Negative   BILIRUBINUA Negative   OCCULTUA 3+*   NITRITE Negative   UROBILINOGEN Negative   LEUKOCYTESUR Negative   RBCUA 3   WBCUA 4   BACTERIA Rare   SQUAMEPITHEL 5       Significant Imaging:   Imaging Results          CT Abdomen Pelvis With Contrast (Final result)  Result time 03/03/18 10:23:49    Final result by Kel Gaston MD (03/03/18 10:23:49)                 Impression:      No acute abnormality.      Electronically signed by: KEL GASTON MD  Date:     03/03/18  Time:    10:23              Narrative:    CT abdomen and pelvis with contrast    Technique: Helical CT images. IV Contrast: 75 cc Omnipaque 350    Comparison: None    Findings:  Visualized lower chest is unremarkable.    The liver, gallbladder, pancreas, spleen, and adrenals are  unremarkable. No bowel wall thickening or dilation. Normal appendix. No ascites.    The kidneys and ureters are unremarkable. The urinary bladder, and uterus and adnexal structures are unremarkable.    No adenopathy.    Body wall soft tissues are unremarkable.    No acute bone abnormality.                          \

## 2018-03-04 LAB
ANION GAP SERPL CALC-SCNC: 9 MMOL/L
BASOPHILS # BLD AUTO: 0.01 K/UL
BASOPHILS NFR BLD: 0.2 %
BUN SERPL-MCNC: 11 MG/DL
CALCIUM SERPL-MCNC: 7.4 MG/DL
CHLORIDE SERPL-SCNC: 117 MMOL/L
CO2 SERPL-SCNC: 17 MMOL/L
CREAT SERPL-MCNC: 0.7 MG/DL
DIFFERENTIAL METHOD: ABNORMAL
EOSINOPHIL # BLD AUTO: 0.1 K/UL
EOSINOPHIL NFR BLD: 0.9 %
ERYTHROCYTE [DISTWIDTH] IN BLOOD BY AUTOMATED COUNT: 12.6 %
EST. GFR  (AFRICAN AMERICAN): >60 ML/MIN/1.73 M^2
EST. GFR  (NON AFRICAN AMERICAN): >60 ML/MIN/1.73 M^2
GLUCOSE SERPL-MCNC: 90 MG/DL
HCT VFR BLD AUTO: 19.8 %
HCT VFR BLD AUTO: 20.4 %
HCT VFR BLD AUTO: 20.8 %
HGB BLD-MCNC: 6.8 G/DL
HGB BLD-MCNC: 7.3 G/DL
HGB BLD-MCNC: 7.4 G/DL
LYMPHOCYTES # BLD AUTO: 2.3 K/UL
LYMPHOCYTES NFR BLD: 38.5 %
MCH RBC QN AUTO: 30.5 PG
MCHC RBC AUTO-ENTMCNC: 35.8 G/DL
MCV RBC AUTO: 85 FL
MONOCYTES # BLD AUTO: 0.4 K/UL
MONOCYTES NFR BLD: 6.8 %
NEUTROPHILS # BLD AUTO: 3.1 K/UL
NEUTROPHILS NFR BLD: 53.6 %
PLATELET # BLD AUTO: 103 K/UL
PMV BLD AUTO: 9.7 FL
POTASSIUM SERPL-SCNC: 4.1 MMOL/L
RBC # BLD AUTO: 2.39 M/UL
SODIUM SERPL-SCNC: 143 MMOL/L
WBC # BLD AUTO: 5.85 K/UL

## 2018-03-04 PROCEDURE — 80048 BASIC METABOLIC PNL TOTAL CA: CPT

## 2018-03-04 PROCEDURE — 85014 HEMATOCRIT: CPT

## 2018-03-04 PROCEDURE — 25000003 PHARM REV CODE 250: Performed by: INTERNAL MEDICINE

## 2018-03-04 PROCEDURE — 21400001 HC TELEMETRY ROOM

## 2018-03-04 PROCEDURE — 94761 N-INVAS EAR/PLS OXIMETRY MLT: CPT

## 2018-03-04 PROCEDURE — 85025 COMPLETE CBC W/AUTO DIFF WBC: CPT

## 2018-03-04 PROCEDURE — 85018 HEMOGLOBIN: CPT | Mod: 91

## 2018-03-04 PROCEDURE — 36430 TRANSFUSION BLD/BLD COMPNT: CPT

## 2018-03-04 PROCEDURE — 36415 COLL VENOUS BLD VENIPUNCTURE: CPT

## 2018-03-04 PROCEDURE — 25000003 PHARM REV CODE 250: Performed by: NURSE PRACTITIONER

## 2018-03-04 PROCEDURE — C9113 INJ PANTOPRAZOLE SODIUM, VIA: HCPCS | Performed by: INTERNAL MEDICINE

## 2018-03-04 PROCEDURE — P9021 RED BLOOD CELLS UNIT: HCPCS

## 2018-03-04 PROCEDURE — 63600175 PHARM REV CODE 636 W HCPCS: Performed by: INTERNAL MEDICINE

## 2018-03-04 RX ORDER — HYDROCODONE BITARTRATE AND ACETAMINOPHEN 500; 5 MG/1; MG/1
TABLET ORAL
Status: DISCONTINUED | OUTPATIENT
Start: 2018-03-04 | End: 2018-03-06 | Stop reason: HOSPADM

## 2018-03-04 RX ADMIN — BUTALBITAL, ACETAMINOPHEN AND CAFFEINE 1 TABLET: 50; 325; 40 TABLET ORAL at 02:03

## 2018-03-04 RX ADMIN — DEXTROSE 8 MG/HR: 50 INJECTION, SOLUTION INTRAVENOUS at 03:03

## 2018-03-04 RX ADMIN — DEXTROSE 8 MG/HR: 50 INJECTION, SOLUTION INTRAVENOUS at 07:03

## 2018-03-04 RX ADMIN — DEXTROSE 8 MG/HR: 50 INJECTION, SOLUTION INTRAVENOUS at 09:03

## 2018-03-04 RX ADMIN — BUTALBITAL, ACETAMINOPHEN AND CAFFEINE 1 TABLET: 50; 325; 40 TABLET ORAL at 09:03

## 2018-03-04 RX ADMIN — DEXTROSE 8 MG/HR: 50 INJECTION, SOLUTION INTRAVENOUS at 02:03

## 2018-03-04 RX ADMIN — SODIUM CHLORIDE: 0.9 INJECTION, SOLUTION INTRAVENOUS at 12:03

## 2018-03-04 RX ADMIN — SODIUM CHLORIDE 1000 ML: 0.9 INJECTION, SOLUTION INTRAVENOUS at 04:03

## 2018-03-04 NOTE — SUBJECTIVE & OBJECTIVE
Interval History: pt denies vomiting blood since admission but does have some upper abdominal pain     Review of Systems   Constitutional: Negative for appetite change, chills, diaphoresis and fever.   HENT: Negative for congestion, hearing loss, sore throat, tinnitus and trouble swallowing.    Eyes: Negative for photophobia, discharge, itching and visual disturbance.   Respiratory: Negative for apnea, cough, wheezing and stridor.    Cardiovascular: Negative for chest pain, palpitations and leg swelling.   Gastrointestinal: Positive for nausea and vomiting. Negative for abdominal distention, abdominal pain, anal bleeding, blood in stool, constipation and diarrhea.   Endocrine: Negative for polydipsia, polyphagia and polyuria.   Genitourinary: Negative for difficulty urinating, dysuria, flank pain and frequency.   Musculoskeletal: Negative for arthralgias, joint swelling and neck stiffness.   Skin: Negative for color change, rash and wound.   Neurological: Negative for dizziness, tremors, seizures, light-headedness, numbness and headaches.   Hematological: Negative for adenopathy.   Psychiatric/Behavioral: Negative for hallucinations and self-injury.     Objective:     Vital Signs (Most Recent):  Temp: 98.6 °F (37 °C) (03/04/18 1645)  Pulse: 76 (03/04/18 1645)  Resp: 18 (03/04/18 1645)  BP: (!) 108/52 (03/04/18 1645)  SpO2: 99 % (03/04/18 1645) Vital Signs (24h Range):  Temp:  [97.9 °F (36.6 °C)-98.7 °F (37.1 °C)] 98.6 °F (37 °C)  Pulse:  [76-83] 76  Resp:  [18] 18  SpO2:  [98 %-100 %] 99 %  BP: ()/(51-58) 108/52     Weight: 80.6 kg (177 lb 11.1 oz)  Body mass index is 32.5 kg/m².    Intake/Output Summary (Last 24 hours) at 03/04/18 1750  Last data filed at 03/04/18 1644   Gross per 24 hour   Intake          3505.17 ml   Output             3500 ml   Net             5.17 ml      Physical Exam   Constitutional: She is oriented to person, place, and time. She appears well-developed and well-nourished. She is  cooperative.   HENT:   Head: Normocephalic and atraumatic.   Eyes: Conjunctivae, EOM and lids are normal. Pupils are equal, round, and reactive to light.   Neck: Normal range of motion and full passive range of motion without pain. Neck supple. No JVD present. No edema present. No thyroid mass present.   Cardiovascular: Normal rate, S1 normal, S2 normal and intact distal pulses.    No murmur heard.  Pulmonary/Chest: Effort normal.   Abdominal: Soft. Bowel sounds are normal. She exhibits no distension and no abdominal bruit. There is no splenomegaly or hepatomegaly. There is no tenderness. There is no CVA tenderness.   Musculoskeletal: Normal range of motion.   Lymphadenopathy:     She has no cervical adenopathy.     She has no axillary adenopathy.   Neurological: She is alert and oriented to person, place, and time. She has normal reflexes. She displays no tremor. She displays no seizure activity.   Skin: Skin is warm, dry and intact.   Psychiatric: She has a normal mood and affect. Her speech is normal. Thought content normal. Cognition and memory are normal.       Significant Labs:   BMP:   Recent Labs  Lab 03/04/18 0428   GLU 90      K 4.1   *   CO2 17*   BUN 11   CREATININE 0.7   CALCIUM 7.4*     CBC:   Recent Labs  Lab 03/03/18  0747  03/03/18 2002 03/04/18  0428 03/04/18  0811   WBC 11.19  --   --  5.85  --    HGB 9.7*  < > 7.2* 7.3* 7.4*   HCT 28.5*  < > 20.4* 20.4* 20.8*     --   --  103*  --    < > = values in this interval not displayed.    Significant Imaging: I have reviewed all pertinent imaging results/findings within the past 24 hours.

## 2018-03-04 NOTE — NURSING
Report received from YO Sethi. Patient resting comfortably, no complaints, no acute distress noted. Plan of care reviewed with patient. Instructed patient to call for assistance before ambulating, side rails up x3, bed alarm set, call light in reach, non skid socks in use. Patient verbalized understanding of instructions. Will continue to monitor.

## 2018-03-04 NOTE — PROGRESS NOTES
"[facility]  Progress Note    Patient Name: Jody Loza  MRN: [id number]  Patient Class: IP- Inpatient   Admission Date: 3/3/2018  Length of Stay: 1 days  Attending Physician: Maame Ruiz MD  Primary Care Provider: Elvia Judge MD        Subjective:     Principal Problem:Acute upper GI bleed    HPI:  Jody Loza is a 42 y.o. female with HX albinism who presents for evaluation of acute onset, painless, dark, coffee ground-like emesis 4+ that started around 2 am in the morning with associated diffuse acute abdominal sharp crampy pains, and 3-4 episodes of loose stool - no noticeable blood but "I didn't look back". NVD have resolved - abdominal pain improved. Patient reports eating sushi at the [employer] bar with about 6 beers around 2 pm that day. She drinks about 1 beer daily but endorses that she has been drinking a little more recently due to stress from [place] season. Additionally, endorses consistent use of BC powders for Headache. She currently has a headache. Lastly, she reports chronic anemia and history of 1 blood transfusion years ago after a minor surgery. Regular menstrual cycles that last about 3 days. Denies recent sick contacts or travel, fever, dysuria, or trauma.     Patient found to be anemic 8.9/25.8 at the time of presentation; AST/ALT/Lipase WNL - lactic acid level 2.4 - CT abdomen unrevealing    Hospital Course:  Pt admitted with UGIB. H/h dropped and plan to transfuse two units PRBC. Pt reports multiple bleeding episodes with surgeries and has been called a "free bleeder". On PPI drip. Monitor H/H.     Interval History: pt denies vomiting blood since admission but does have some upper abdominal pain     Review of Systems   Constitutional: Negative for appetite change, chills, diaphoresis and fever.   HENT: Negative for congestion, hearing loss, sore throat, tinnitus and trouble swallowing.    Eyes: Negative for photophobia, discharge, itching and " visual disturbance.   Respiratory: Negative for apnea, cough, wheezing and stridor.    Cardiovascular: Negative for chest pain, palpitations and leg swelling.   Gastrointestinal: Positive for nausea and vomiting. Negative for abdominal distention, abdominal pain, anal bleeding, blood in stool, constipation and diarrhea.   Endocrine: Negative for polydipsia, polyphagia and polyuria.   Genitourinary: Negative for difficulty urinating, dysuria, flank pain and frequency.   Musculoskeletal: Negative for arthralgias, joint swelling and neck stiffness.   Skin: Negative for color change, rash and wound.   Neurological: Negative for dizziness, tremors, seizures, light-headedness, numbness and headaches.   Hematological: Negative for adenopathy.   Psychiatric/Behavioral: Negative for hallucinations and self-injury.     Objective:     Vital Signs (Most Recent):  Temp: 98.6 °F (37 °C) (03/04/18 1645)  Pulse: 76 (03/04/18 1645)  Resp: 18 (03/04/18 1645)  BP: (!) 108/52 (03/04/18 1645)  SpO2: 99 % (03/04/18 1645) Vital Signs (24h Range):  Temp:  [97.9 °F (36.6 °C)-98.7 °F (37.1 °C)] 98.6 °F (37 °C)  Pulse:  [76-83] 76  Resp:  [18] 18  SpO2:  [98 %-100 %] 99 %  BP: ()/(51-58) 108/52     Weight: 80.6 kg (177 lb 11.1 oz)  Body mass index is 32.5 kg/m².    Intake/Output Summary (Last 24 hours) at 03/04/18 1750  Last data filed at 03/04/18 1644   Gross per 24 hour   Intake          3505.17 ml   Output             3500 ml   Net             5.17 ml      Physical Exam   Constitutional: She is oriented to person, place, and time. She appears well-developed and well-nourished. She is cooperative.   HENT:   Head: Normocephalic and atraumatic.   Eyes: Conjunctivae, EOM and lids are normal. Pupils are equal, round, and reactive to light.   Neck: Normal range of motion and full passive range of motion without pain. Neck supple. No JVD present. No edema present. No thyroid mass present.   Cardiovascular: Normal rate, S1 normal, S2 normal  and intact distal pulses.    No murmur heard.  Pulmonary/Chest: Effort normal.   Abdominal: Soft. Bowel sounds are normal. She exhibits no distension and no abdominal bruit. There is no splenomegaly or hepatomegaly. There is no tenderness. There is no CVA tenderness.   Musculoskeletal: Normal range of motion.   Lymphadenopathy:     She has no cervical adenopathy.     She has no axillary adenopathy.   Neurological: She is alert and oriented to person, place, and time. She has normal reflexes. She displays no tremor. She displays no seizure activity.   Skin: Skin is warm, dry and intact.   Psychiatric: She has a normal mood and affect. Her speech is normal. Thought content normal. Cognition and memory are normal.       Significant Labs:   BMP:   Recent Labs  Lab 03/04/18 0428   GLU 90      K 4.1   *   CO2 17*   BUN 11   CREATININE 0.7   CALCIUM 7.4*     CBC:   Recent Labs  Lab 03/03/18  0747  03/03/18 2002 03/04/18  0428 03/04/18  0811   WBC 11.19  --   --  5.85  --    HGB 9.7*  < > 7.2* 7.3* 7.4*   HCT 28.5*  < > 20.4* 20.4* 20.8*     --   --  103*  --    < > = values in this interval not displayed.    Significant Imaging: I have reviewed all pertinent imaging results/findings within the past 24 hours.    Assessment/Plan:      * Acute upper GI bleed    Patient reports consistent use of BC powders for HA/frequent alcohol use/raw seafood ingestion w/i past 24 hours - Anemia 8.9/25.8; appears to be trending downward  - serial H/Hs -  GI consulted and following PPI ggt infusing - no plan for EGD until Monday - CT abdomen unrevealing; alcohol gastritis???acute hemorrhagic gastroenteritis???gastric ulcer??  -Trend H/H  -T&S    Results for SERA PHILLIPS (MRN [id number]) as of 3/3/2018 15:10   Ref. Range 7/31/2014 08:05 3/3/2018 07:47   Hemoglobin Latest Ref Range: 12.0 - 16.0 g/dL 13.4 9.7 (L)   Hematocrit Latest Ref Range: 37.0 - 48.5 % 40.5 28.5 (L)           Anemia due to acute blood loss    Trended  downward in past months - etiology unclear  Iron studies  GI following - appreciate recs - anticipate EGD on Monday  Transfuse tow units PRBC  H/o bleeding after surgeries in past         Vision impairment    L eye strabismus - chronic - no new issues          VTE Risk Mitigation         Ordered     Medium Risk of VTE  Once      03/03/18 1323     Place PAZ hose  Until discontinued      03/03/18 1323     Place sequential compression device  Until discontinued      03/03/18 1323              Maame Ruiz MD  Department of Hospital Medicine   [facility]

## 2018-03-04 NOTE — NURSING
Report given to Jossie FERNANDEZ LPN. Patient resting comfortably, no complaints, no acute distress noted.  at bedside. Contuntious NS and Protonix infusing.

## 2018-03-04 NOTE — PLAN OF CARE
"TN completed discharge needs assessment. TN provided and reviewed with patient "Blue My Health Packet" , "Help At Home" and "Discharge Planning Begins on Admission" handouts. TN discussed with patient the things the patient is responsible for to manage patient's  healthcare at home. Patient verbalized understanding & teachback implemented.   Patient prefers pms doctor appointments.      Would like to have new pcp/female at [place] to establish  Care and follow up from the hospital  Things that YOU are responsible for to Manage Your Care At Home:  1. Getting your prescriptions filled.  2. Taking you medications as directed. DO NOT MISS ANY DOSES!  3. Going to your follow-up doctor appointments. This is important because it allows the doctor to monitor your progress and to determine if any changes need to be made to your treatment plan.     03/04/18 1419   Discharge Assessment   Assessment Type Discharge Planning Assessment   Confirmed/corrected address and phone number on facesheet? Yes   Assessment information obtained from? Patient   Communicated expected length of stay with patient/caregiver no   Prior to hospitilization cognitive status: Alert/Oriented;No Deficits   Prior to hospitalization functional status: Independent   Current cognitive status: Alert/Oriented;No Deficits   Current Functional Status: Independent   Lives With spouse   Able to Return to Prior Arrangements yes   Is patient able to care for self after discharge? Yes   Who are your caregiver(s) and their phone number(s)? ([facility] [phone])   Patient's perception of discharge disposition admitted as an inpatient   Readmission Within The Last 30 Days no previous admission in last 30 days   Patient currently being followed by outpatient case management? No   Patient currently receives any other outside agency services? No   Equipment Currently Used at Home none   Do you have any problems affording any of your prescribed " medications? No   Is the patient taking medications as prescribed? yes   Does the patient have transportation home? Yes   Transportation Available family or friend will provide   Discharge Plan A Home with family   Discharge Plan B Home with family   Patient/Family In Agreement With Plan yes     [employer] [facility] [id number] - [facility] [address] AT [facility] [facility] [address]  [address]  [facility] [address]  Phone: [phone] Fax: [phone]    [address] - [address] [address]  Phone: [phone] Fax: [phone]    [employer] [id number]  [place] [address] AT [address]  [place] [address]  Phone: [phone] Fax: [phone]

## 2018-03-04 NOTE — HOSPITAL COURSE
Ms Loza presented with upper GI bleed. NPO, IVFs and blood transfusion for Hgb of 6.8 g/dL. Responded well to 2U of PRBCs and remained hemodynamically stable. Underwent EGD on 3/5. This showed Normal esophagus, five non-bleeding gastric ulcers with no stigmata of bleeding (Biopsied) and normal examined duodenum. Counseled on NSAID/BC powder use and abstaining from alcohol. Is to continue PPI BID until repeat EGD as outpatient in 6 weeks to ensure healing. Tolerated diet well. Independent and ambulatory. Advance diet as tolerated. Activity as tolerated.

## 2018-03-04 NOTE — PLAN OF CARE
Problem: Fall Risk (Adult)  Intervention: Patient Rounds   03/04/18 0730   Safety Interventions   Patient Rounds bed in low position;bed wheels locked;call light in reach;clutter free environment maintained;ID band on;placement of personal items at bedside;toileting offered;visualized patient       Goal: Absence of Falls  Patient will demonstrate the desired outcomes by discharge/transition of care.    03/03/18 1736   Fall Risk (Adult)   Absence of Falls making progress toward outcome       Problem: Pressure Ulcer Risk (Sixto Scale) (Adult,Obstetrics,Pediatric)  Intervention: Turn/Reposition Often   03/04/18 0730   Positioning   Body Position positioned/repositioned independently       Goal: Skin Integrity  Patient will demonstrate the desired outcomes by discharge/transition of care.    03/04/18 0737   Pressure Ulcer Risk (Sixto Scale) (Adult,Obstetrics,Pediatric)   Skin Integrity making progress toward outcome       Comments: No blood seen in the urine. No c/o nausea and no vomiting during shift. Skin remains intact and without injury. SR on telemetry monitor. Non-skid socks in place and able to ambulate to bathroom with stand assistance. Clear liquid diet tolerated well. H&H monitored.

## 2018-03-04 NOTE — PLAN OF CARE
Problem: Fall Risk (Adult)  Intervention: Reduce Risk/Promote Restraint Free Environment   18 173   Safety Interventions   Environmental Safety Modification assistive device/personal items within reach;clutter free environment maintained;lighting adjusted;room near unit station;room organization consistent   Prevent  Drop/Fall   Safety/Security Measures bed alarm set     Intervention: Review Medications/Identify Contributors to Fall Risk   18 173   Safety Interventions   Medication Review/Management medications reviewed     Intervention: Patient Rounds   18 1500   Safety Interventions   Patient Rounds bed in low position;bed wheels locked;call light in reach;clutter free environment maintained;ID band on;placement of personal items at bedside;toileting offered;visualized patient     Intervention: Safety Promotion/Fall Prevention   18 1500   Safety Interventions   Safety Promotion/Fall Prevention assistive device/personal item within reach;bed alarm set;commode/urinal/bedpan at bedside;Fall Risk reviewed with patient/family;lighting adjusted;medications reviewed;nonskid shoes/socks when out of bed;room near unit station;instructed to call staff for mobility       Goal: Identify Related Risk Factors and Signs and Symptoms  Related risk factors and signs and symptoms are identified upon initiation of Human Response Clinical Practice Guideline (CPG)   Outcome: Ongoing (interventions implemented as appropriate)   18   Fall Risk   Related Risk Factors (Fall Risk) age-related changes;fatigue/slow reaction;gait/mobility problems;environment unfamiliar;sleep pattern alteration   Signs and Symptoms (Fall Risk) presence of risk factors     Goal: Absence of Falls  Patient will demonstrate the desired outcomes by discharge/transition of care.   Outcome: Ongoing (interventions implemented as appropriate)   18   Fall Risk (Adult)   Absence of Falls making progress toward  outcome

## 2018-03-04 NOTE — ASSESSMENT & PLAN NOTE
Patient reports consistent use of BC powders for HA/frequent alcohol use/raw seafood ingestion w/i past 24 hours - Anemia 8.9/25.8; appears to be trending downward  - serial H/Hs -  GI consulted and following PPI ggt infusing - no plan for EGD until Monday - CT abdomen unrevealing; alcohol gastritis???acute hemorrhagic gastroenteritis???gastric ulcer??  -Trend H/H  -T&S    Results for SERA PHILLIPS (MRN [id number]) as of 3/3/2018 15:10   Ref. Range 7/31/2014 08:05 3/3/2018 07:47   Hemoglobin Latest Ref Range: 12.0 - 16.0 g/dL 13.4 9.7 (L)   Hematocrit Latest Ref Range: 37.0 - 48.5 % 40.5 28.5 (L)

## 2018-03-04 NOTE — ASSESSMENT & PLAN NOTE
Trended downward in past months - etiology unclear  Iron studies  GI following - appreciate recs - anticipate EGD on Monday  Transfuse tow units PRBC  H/o bleeding after surgeries in past

## 2018-03-04 NOTE — PROGRESS NOTES
Results for SERA PHILLIPS (MRN [id number]) as of 3/3/2018 22:06   Ref. Range 3/3/2018 07:47 3/3/2018 07:55 3/3/2018 07:58 3/3/2018 09:22 3/3/2018 09:30 3/3/2018 10:10 3/3/2018 13:53 3/3/2018 20:02   Hemoglobin Latest Ref Range: 12.0 - 16.0 g/dL 9.7 (L)      8.9 (L) 7.2 (L)   Hematocrit Latest Ref Range: 37.0 - 48.5 % 28.5 (L)      25.8 (L) 20.4 (L)       Dr. Perez notified in regard to decrease in H&H.Cross and typed already completed. B/P at pt's norm. Continue to monitor. CBC to be done again in am.

## 2018-03-05 ENCOUNTER — ANESTHESIA (OUTPATIENT)
Dept: ENDOSCOPY | Facility: HOSPITAL | Age: 43
DRG: 378 | End: 2018-03-05
Payer: MEDICARE

## 2018-03-05 ENCOUNTER — ANESTHESIA EVENT (OUTPATIENT)
Dept: ENDOSCOPY | Facility: HOSPITAL | Age: 43
DRG: 378 | End: 2018-03-05
Payer: MEDICARE

## 2018-03-05 LAB
BASOPHILS # BLD AUTO: 0.01 K/UL
BASOPHILS NFR BLD: 0.1 %
BLD PROD TYP BPU: NORMAL
BLD PROD TYP BPU: NORMAL
BLOOD UNIT EXPIRATION DATE: NORMAL
BLOOD UNIT EXPIRATION DATE: NORMAL
BLOOD UNIT TYPE CODE: 6200
BLOOD UNIT TYPE CODE: 6200
BLOOD UNIT TYPE: NORMAL
BLOOD UNIT TYPE: NORMAL
CODING SYSTEM: NORMAL
CODING SYSTEM: NORMAL
DIFFERENTIAL METHOD: ABNORMAL
DISPENSE STATUS: NORMAL
DISPENSE STATUS: NORMAL
EOSINOPHIL # BLD AUTO: 0.1 K/UL
EOSINOPHIL NFR BLD: 1.1 %
ERYTHROCYTE [DISTWIDTH] IN BLOOD BY AUTOMATED COUNT: 13 %
ESTIMATED AVG GLUCOSE: 97 MG/DL
HBA1C MFR BLD HPLC: 5 %
HCT VFR BLD AUTO: 30.1 %
HGB BLD-MCNC: 10.1 G/DL
LYMPHOCYTES # BLD AUTO: 1.5 K/UL
LYMPHOCYTES NFR BLD: 19.1 %
MCH RBC QN AUTO: 29.9 PG
MCHC RBC AUTO-ENTMCNC: 33.6 G/DL
MCV RBC AUTO: 89 FL
MONOCYTES # BLD AUTO: 0.5 K/UL
MONOCYTES NFR BLD: 5.7 %
NEUTROPHILS # BLD AUTO: 5.8 K/UL
NEUTROPHILS NFR BLD: 73.9 %
PLATELET # BLD AUTO: 222 K/UL
PMV BLD AUTO: 8.9 FL
POCT GLUCOSE: 96 MG/DL (ref 70–110)
RBC # BLD AUTO: 3.38 M/UL
TRANS ERYTHROCYTES VOL PATIENT: NORMAL ML
TRANS ERYTHROCYTES VOL PATIENT: NORMAL ML
WBC # BLD AUTO: 7.91 K/UL

## 2018-03-05 PROCEDURE — 21400001 HC TELEMETRY ROOM

## 2018-03-05 PROCEDURE — P9021 RED BLOOD CELLS UNIT: HCPCS

## 2018-03-05 PROCEDURE — 83036 HEMOGLOBIN GLYCOSYLATED A1C: CPT

## 2018-03-05 PROCEDURE — 85025 COMPLETE CBC W/AUTO DIFF WBC: CPT

## 2018-03-05 PROCEDURE — 37000009 HC ANESTHESIA EA ADD 15 MINS: Performed by: INTERNAL MEDICINE

## 2018-03-05 PROCEDURE — 43239 EGD BIOPSY SINGLE/MULTIPLE: CPT | Performed by: INTERNAL MEDICINE

## 2018-03-05 PROCEDURE — 36415 COLL VENOUS BLD VENIPUNCTURE: CPT

## 2018-03-05 PROCEDURE — 25000003 PHARM REV CODE 250: Performed by: INTERNAL MEDICINE

## 2018-03-05 PROCEDURE — 37000008 HC ANESTHESIA 1ST 15 MINUTES: Performed by: INTERNAL MEDICINE

## 2018-03-05 PROCEDURE — 63600175 PHARM REV CODE 636 W HCPCS: Performed by: INTERNAL MEDICINE

## 2018-03-05 PROCEDURE — C9113 INJ PANTOPRAZOLE SODIUM, VIA: HCPCS | Performed by: INTERNAL MEDICINE

## 2018-03-05 PROCEDURE — 88305 TISSUE EXAM BY PATHOLOGIST: CPT | Performed by: PATHOLOGY

## 2018-03-05 PROCEDURE — 63600175 PHARM REV CODE 636 W HCPCS: Performed by: REGISTERED NURSE

## 2018-03-05 PROCEDURE — 25000003 PHARM REV CODE 250: Performed by: NURSE PRACTITIONER

## 2018-03-05 PROCEDURE — 0DB78ZX EXCISION OF STOMACH, PYLORUS, VIA NATURAL OR ARTIFICIAL OPENING ENDOSCOPIC, DIAGNOSTIC: ICD-10-PCS | Performed by: INTERNAL MEDICINE

## 2018-03-05 PROCEDURE — 27201012 HC FORCEPS, HOT/COLD, DISP: Performed by: INTERNAL MEDICINE

## 2018-03-05 PROCEDURE — D9220A PRA ANESTHESIA: Mod: ANES,,, | Performed by: ANESTHESIOLOGY

## 2018-03-05 PROCEDURE — D9220A PRA ANESTHESIA: Mod: CRNA,,, | Performed by: REGISTERED NURSE

## 2018-03-05 PROCEDURE — 88305 TISSUE EXAM BY PATHOLOGIST: CPT | Mod: 26,,, | Performed by: PATHOLOGY

## 2018-03-05 RX ORDER — PANTOPRAZOLE SODIUM 40 MG/1
40 TABLET, DELAYED RELEASE ORAL DAILY
Status: DISCONTINUED | OUTPATIENT
Start: 2018-03-06 | End: 2018-03-06

## 2018-03-05 RX ORDER — LIDOCAINE HYDROCHLORIDE 20 MG/ML
INJECTION, SOLUTION EPIDURAL; INFILTRATION; INTRACAUDAL; PERINEURAL
Status: DISPENSED
Start: 2018-03-05 | End: 2018-03-06

## 2018-03-05 RX ORDER — PROPOFOL 10 MG/ML
VIAL (ML) INTRAVENOUS
Status: DISCONTINUED | OUTPATIENT
Start: 2018-03-05 | End: 2018-03-05

## 2018-03-05 RX ORDER — PROPOFOL 10 MG/ML
VIAL (ML) INTRAVENOUS
Status: COMPLETED
Start: 2018-03-05 | End: 2018-03-05

## 2018-03-05 RX ORDER — INSULIN ASPART 100 [IU]/ML
0-5 INJECTION, SOLUTION INTRAVENOUS; SUBCUTANEOUS EVERY 6 HOURS PRN
Status: DISCONTINUED | OUTPATIENT
Start: 2018-03-05 | End: 2018-03-06 | Stop reason: HOSPADM

## 2018-03-05 RX ORDER — GLUCAGON 1 MG
1 KIT INJECTION
Status: DISCONTINUED | OUTPATIENT
Start: 2018-03-05 | End: 2018-03-06 | Stop reason: HOSPADM

## 2018-03-05 RX ORDER — LIDOCAINE HCL/PF 100 MG/5ML
SYRINGE (ML) INTRAVENOUS
Status: DISCONTINUED | OUTPATIENT
Start: 2018-03-05 | End: 2018-03-05

## 2018-03-05 RX ADMIN — PROPOFOL 30 MG: 10 INJECTION, EMULSION INTRAVENOUS at 12:03

## 2018-03-05 RX ADMIN — PROPOFOL 50 MG: 10 INJECTION, EMULSION INTRAVENOUS at 12:03

## 2018-03-05 RX ADMIN — BUTALBITAL, ACETAMINOPHEN AND CAFFEINE 1 TABLET: 50; 325; 40 TABLET ORAL at 02:03

## 2018-03-05 RX ADMIN — PROPOFOL 20 MG: 10 INJECTION, EMULSION INTRAVENOUS at 12:03

## 2018-03-05 RX ADMIN — DEXTROSE 8 MG/HR: 50 INJECTION, SOLUTION INTRAVENOUS at 05:03

## 2018-03-05 RX ADMIN — LIDOCAINE HYDROCHLORIDE 50 MG: 20 INJECTION, SOLUTION INTRAVENOUS at 12:03

## 2018-03-05 RX ADMIN — DEXTROSE 8 MG/HR: 50 INJECTION, SOLUTION INTRAVENOUS at 02:03

## 2018-03-05 NOTE — ANESTHESIA PREPROCEDURE EVALUATION
03/05/2018  Jody Loza is a 42 y.o., female.    Anesthesia Evaluation    I have reviewed the Patient Summary Reports.     I have reviewed the Medications.     Review of Systems  Anesthesia Hx:  No problems with previous Anesthesia   Denies Personal Hx of Anesthesia complications.   Hematology/Oncology:  Hematology Normal   Oncology Normal     EENT/Dental:EENT/Dental Normal   Cardiovascular:  Cardiovascular Normal Exercise tolerance: good     Pulmonary:  Pulmonary Normal    Renal/:  Renal/ Normal     Hepatic/GI:  Hepatic/GI Normal    Musculoskeletal:  Musculoskeletal Normal    Neurological:  Neurology Normal    Endocrine:  Endocrine Normal    Dermatological:  Skin Normal    Psych:  Psychiatric Normal           Physical Exam  General:  Well nourished    Airway/Jaw/Neck:  Airway Findings: Mouth Opening: Normal Tongue: Normal  Mallampati: II      Dental:  Dental Findings: In tact   Chest/Lungs:  Chest/Lungs Clear    Heart/Vascular:  Heart Findings: Normal Heart murmur: negative       Mental Status:  Mental Status Findings:  Cooperative, Alert and Oriented         Anesthesia Plan  Type of Anesthesia, risks & benefits discussed:  Anesthesia Type:  general, MAC, regional  Patient's Preference:   Intra-op Monitoring Plan: standard ASA monitors  Intra-op Monitoring Plan Comments:   Post Op Pain Control Plan: multimodal analgesia  Post Op Pain Control Plan Comments:   Induction:   IV  Beta Blocker:  Patient is not currently on a Beta-Blocker (No further documentation required).       Informed Consent: Patient understands risks and agrees with Anesthesia plan.  Questions answered. Anesthesia consent signed with patient.  ASA Score: 2     Day of Surgery Review of History & Physical:            Ready For Surgery From Anesthesia Perspective.

## 2018-03-05 NOTE — NURSING
Report given to EDENILSON Monge. Patient resting comfortably, no complaints, no acute distress noted. IV fluids NS & Protonix infusing.  at bedside.

## 2018-03-05 NOTE — ASSESSMENT & PLAN NOTE
Trended downward in past months - etiology unclear  Iron studies  GI following - appreciate recs - anticipate EGD on Monday  Transfuse 2 units PRBC 3/4  H/o bleeding after surgeries in past     Dc tomorrow if stable and tolerating diet

## 2018-03-05 NOTE — PLAN OF CARE
Problem: Patient Care Overview  Goal: Plan of Care Review  All systems asessed. No falls or injuries. Will continue to monitor.

## 2018-03-05 NOTE — PROGRESS NOTES
"[facility]  Progress Note    Patient Name: Jody Loza  MRN: [id number]  Patient Class: IP- Inpatient   Admission Date: 3/3/2018  Length of Stay: 2 days  Attending Physician: Maame Ruiz MD  Primary Care Provider: Elvia Judge MD        Subjective:     Principal Problem:Acute upper GI bleed    HPI:  Jody Loza is a 42 y.o. female with HX albinism who presents for evaluation of acute onset, painless, dark, coffee ground-like emesis 4+ that started around 2 am in the morning with associated diffuse acute abdominal sharp crampy pains, and 3-4 episodes of loose stool - no noticeable blood but "I didn't look back". NVD have resolved - abdominal pain improved. Patient reports eating sushi at the [employer] bar with about 6 beers around 2 pm that day. She drinks about 1 beer daily but endorses that she has been drinking a little more recently due to stress from [place] season. Additionally, endorses consistent use of BC powders for Headache. She currently has a headache. Lastly, she reports chronic anemia and history of 1 blood transfusion years ago after a minor surgery. Regular menstrual cycles that last about 3 days. Denies recent sick contacts or travel, fever, dysuria, or trauma.     Patient found to be anemic 8.9/25.8 at the time of presentation; AST/ALT/Lipase WNL - lactic acid level 2.4 - CT abdomen unrevealing    Hospital Course:  Pt admitted with UGIB. H/h dropped and plan to transfuse two units PRBC. Pt reports multiple bleeding episodes with surgeries and has been called a "free bleeder". On PPI drip. Monitor H/H.     3/5 - EGD completed with 5 non-bleeding ulcers, counseled on BC powder use, continue PPI, c/o abdominal pain, continue liquid diet and advance as tolerated, dc tomorrow if no acute issues overnight. Continue IVF as BP running low    Interval History: pt c/o abd pain and weakness, BS normal range, h/h responded to the two units PRBC " transfused    Review of Systems   Constitutional: Negative for appetite change, chills, diaphoresis and fever.   HENT: Negative for congestion, hearing loss, sore throat, tinnitus and trouble swallowing.    Eyes: Negative for photophobia, discharge, itching and visual disturbance.   Respiratory: Negative for apnea, cough, wheezing and stridor.    Cardiovascular: Negative for chest pain, palpitations and leg swelling.   Gastrointestinal: Positive for nausea and vomiting. Negative for abdominal distention, abdominal pain, anal bleeding, blood in stool, constipation and diarrhea.   Endocrine: Negative for polydipsia, polyphagia and polyuria.   Genitourinary: Negative for difficulty urinating, dysuria, flank pain and frequency.   Musculoskeletal: Negative for arthralgias, joint swelling and neck stiffness.   Skin: Negative for color change, rash and wound.   Neurological: Negative for dizziness, tremors, seizures, light-headedness, numbness and headaches.   Hematological: Negative for adenopathy.   Psychiatric/Behavioral: Negative for hallucinations and self-injury.     Objective:     Vital Signs (Most Recent):  Temp: 98.3 °F (36.8 °C) (03/05/18 1342)  Pulse: 63 (03/05/18 1342)  Resp: 18 (03/05/18 1342)  BP: (!) 107/59 (03/05/18 1342)  SpO2: 97 % (03/05/18 1342) Vital Signs (24h Range):  Temp:  [98.3 °F (36.8 °C)-99.2 °F (37.3 °C)] 98.3 °F (36.8 °C)  Pulse:  [63-82] 63  Resp:  [18-20] 18  SpO2:  [96 %-100 %] 97 %  BP: ()/(50-61) 107/59     Weight: 80.6 kg (177 lb 11.1 oz)  Body mass index is 32.5 kg/m².    Intake/Output Summary (Last 24 hours) at 03/05/18 1501  Last data filed at 03/05/18 1240   Gross per 24 hour   Intake          2466.33 ml   Output              600 ml   Net          1866.33 ml      Physical Exam   Constitutional: She is oriented to person, place, and time. She appears well-developed and well-nourished. She is cooperative.   HENT:   Head: Normocephalic and atraumatic.   Eyes: Conjunctivae, EOM  and lids are normal. Pupils are equal, round, and reactive to light.   Neck: Normal range of motion and full passive range of motion without pain. Neck supple. No JVD present. No edema present. No thyroid mass present.   Cardiovascular: Normal rate, S1 normal, S2 normal and intact distal pulses.    No murmur heard.  Pulmonary/Chest: Effort normal.   Abdominal: Soft. Bowel sounds are normal. She exhibits no distension and no abdominal bruit. There is no splenomegaly or hepatomegaly. There is no tenderness. There is no CVA tenderness.   Musculoskeletal: Normal range of motion.   Lymphadenopathy:     She has no cervical adenopathy.     She has no axillary adenopathy.   Neurological: She is alert and oriented to person, place, and time. She has normal reflexes. She displays no tremor. She displays no seizure activity.   Skin: Skin is warm, dry and intact.   Psychiatric: She has a normal mood and affect. Her speech is normal. Thought content normal. Cognition and memory are normal.       Significant Labs:   A1C: No results for input(s): HGBA1C in the last 4320 hours.  BMP:   Recent Labs  Lab 03/04/18  0428   GLU 90      K 4.1   *   CO2 17*   BUN 11   CREATININE 0.7   CALCIUM 7.4*     CBC:   Recent Labs  Lab 03/04/18  0428 03/04/18  0811 03/04/18  2030 03/05/18  0958   WBC 5.85  --   --  7.91   HGB 7.3* 7.4* 6.8* 10.1*   HCT 20.4* 20.8* 19.8* 30.1*   *  --   --  222       Significant Imaging: I have reviewed all pertinent imaging results/findings within the past 24 hours.    Assessment/Plan:      * Acute upper GI bleed    Patient reports consistent use of BC powders for HA/frequent alcohol use/raw seafood ingestion w/i past 24 hours - Anemia 8.9/25.8; appears to be trending downward  - serial H/Hs -  GI consulted and following PPI ggt infusing - no plan for EGD until Monday - CT abdomen unrevealing; alcohol gastritis???acute hemorrhagic gastroenteritis???gastric ulcer??  -Trend H/H  -T&S    Results for  PHILLIPSSERA (MRN [id number]) as of 3/3/2018 15:10   Ref. Range 7/31/2014 08:05 3/3/2018 07:47   Hemoglobin Latest Ref Range: 12.0 - 16.0 g/dL 13.4 9.7 (L)   Hematocrit Latest Ref Range: 37.0 - 48.5 % 40.5 28.5 (L)    PPI BID and repeat EGD in 6 weeks         Anemia due to acute blood loss    Trended downward in past months - etiology unclear  Iron studies  GI following - appreciate recs - anticipate EGD on Monday  Transfuse 2 units PRBC 3/4  H/o bleeding after surgeries in past     Dc tomorrow if stable and tolerating diet         Vision impairment    L eye strabismus - chronic - no new issues          VTE Risk Mitigation         Ordered     Medium Risk of VTE  Once      03/03/18 1323     Place PAZ hose  Until discontinued      03/03/18 1323     Place sequential compression device  Until discontinued      03/03/18 1323              Maame Ruiz MD  Department of Hospital Medicine   [facility]

## 2018-03-05 NOTE — SUBJECTIVE & OBJECTIVE
Interval History: pt c/o abd pain and weakness, BS normal range, h/h responded to the two units PRBC transfused    Review of Systems   Constitutional: Negative for appetite change, chills, diaphoresis and fever.   HENT: Negative for congestion, hearing loss, sore throat, tinnitus and trouble swallowing.    Eyes: Negative for photophobia, discharge, itching and visual disturbance.   Respiratory: Negative for apnea, cough, wheezing and stridor.    Cardiovascular: Negative for chest pain, palpitations and leg swelling.   Gastrointestinal: Positive for nausea and vomiting. Negative for abdominal distention, abdominal pain, anal bleeding, blood in stool, constipation and diarrhea.   Endocrine: Negative for polydipsia, polyphagia and polyuria.   Genitourinary: Negative for difficulty urinating, dysuria, flank pain and frequency.   Musculoskeletal: Negative for arthralgias, joint swelling and neck stiffness.   Skin: Negative for color change, rash and wound.   Neurological: Negative for dizziness, tremors, seizures, light-headedness, numbness and headaches.   Hematological: Negative for adenopathy.   Psychiatric/Behavioral: Negative for hallucinations and self-injury.     Objective:     Vital Signs (Most Recent):  Temp: 98.3 °F (36.8 °C) (03/05/18 1342)  Pulse: 63 (03/05/18 1342)  Resp: 18 (03/05/18 1342)  BP: (!) 107/59 (03/05/18 1342)  SpO2: 97 % (03/05/18 1342) Vital Signs (24h Range):  Temp:  [98.3 °F (36.8 °C)-99.2 °F (37.3 °C)] 98.3 °F (36.8 °C)  Pulse:  [63-82] 63  Resp:  [18-20] 18  SpO2:  [96 %-100 %] 97 %  BP: ()/(50-61) 107/59     Weight: 80.6 kg (177 lb 11.1 oz)  Body mass index is 32.5 kg/m².    Intake/Output Summary (Last 24 hours) at 03/05/18 1501  Last data filed at 03/05/18 1240   Gross per 24 hour   Intake          2466.33 ml   Output              600 ml   Net          1866.33 ml      Physical Exam   Constitutional: She is oriented to person, place, and time. She appears well-developed and  well-nourished. She is cooperative.   HENT:   Head: Normocephalic and atraumatic.   Eyes: Conjunctivae, EOM and lids are normal. Pupils are equal, round, and reactive to light.   Neck: Normal range of motion and full passive range of motion without pain. Neck supple. No JVD present. No edema present. No thyroid mass present.   Cardiovascular: Normal rate, S1 normal, S2 normal and intact distal pulses.    No murmur heard.  Pulmonary/Chest: Effort normal.   Abdominal: Soft. Bowel sounds are normal. She exhibits no distension and no abdominal bruit. There is no splenomegaly or hepatomegaly. There is no tenderness. There is no CVA tenderness.   Musculoskeletal: Normal range of motion.   Lymphadenopathy:     She has no cervical adenopathy.     She has no axillary adenopathy.   Neurological: She is alert and oriented to person, place, and time. She has normal reflexes. She displays no tremor. She displays no seizure activity.   Skin: Skin is warm, dry and intact.   Psychiatric: She has a normal mood and affect. Her speech is normal. Thought content normal. Cognition and memory are normal.       Significant Labs:   A1C: No results for input(s): HGBA1C in the last 4320 hours.  BMP:   Recent Labs  Lab 03/04/18 0428   GLU 90      K 4.1   *   CO2 17*   BUN 11   CREATININE 0.7   CALCIUM 7.4*     CBC:   Recent Labs  Lab 03/04/18  0428 03/04/18  0811 03/04/18  2030 03/05/18  0958   WBC 5.85  --   --  7.91   HGB 7.3* 7.4* 6.8* 10.1*   HCT 20.4* 20.8* 19.8* 30.1*   *  --   --  222       Significant Imaging: I have reviewed all pertinent imaging results/findings within the past 24 hours.

## 2018-03-05 NOTE — TRANSFER OF CARE
"Anesthesia Transfer of Care Note    Patient: Jody Loza    Procedure(s) Performed: Procedure(s) (LRB):  ESOPHAGOGASTRODUODENOSCOPY (EGD) (N/A)    Patient location: GI    Anesthesia Type: general    Transport from OR: Transported from OR on room air with adequate spontaneous ventilation    Post pain: adequate analgesia    Post assessment: no apparent anesthetic complications and tolerated procedure well    Post vital signs: stable    Level of consciousness: awake, alert and oriented    Nausea/Vomiting: no nausea/vomiting    Complications: none    Transfer of care protocol was followed      Last vitals:   Visit Vitals  BP (!) 104/51   Pulse 78   Temp 37 °C (98.6 °F) (Oral)   Resp 20   Ht 5' 2" (1.575 m)   Wt 80.6 kg (177 lb 11.1 oz)   LMP 02/22/2018 (Approximate)   SpO2 98%   Breastfeeding? No   BMI 32.50 kg/m²     "

## 2018-03-05 NOTE — NURSING
Patient requested IV fluids, diet, and IV Protonix discontinued. Dr. Ruiz notified. See new orders.

## 2018-03-05 NOTE — PROGRESS NOTES
According to the assessment, pt would like to establish care with a female PCP, and would prefer afternoon appointment. BELTRAN contacted scheduling @ [phone] to schedule follow-up appointment. BELTRAN spoke with Yasmine; Yasmine scheduled appointment on 3/12 @ 2:40PM.

## 2018-03-05 NOTE — ASSESSMENT & PLAN NOTE
Patient reports consistent use of BC powders for HA/frequent alcohol use/raw seafood ingestion w/i past 24 hours - Anemia 8.9/25.8; appears to be trending downward  - serial H/Hs -  GI consulted and following PPI ggt infusing - no plan for EGD until Monday - CT abdomen unrevealing; alcohol gastritis???acute hemorrhagic gastroenteritis???gastric ulcer??  -Trend H/H  -T&S    Results for SERA PHILLIPS (MRN [id number]) as of 3/3/2018 15:10   Ref. Range 7/31/2014 08:05 3/3/2018 07:47   Hemoglobin Latest Ref Range: 12.0 - 16.0 g/dL 13.4 9.7 (L)   Hematocrit Latest Ref Range: 37.0 - 48.5 % 40.5 28.5 (L)    PPI BID and repeat EGD in 6 weeks

## 2018-03-06 VITALS
BODY MASS INDEX: 32.7 KG/M2 | WEIGHT: 177.69 LBS | OXYGEN SATURATION: 97 % | HEIGHT: 62 IN | TEMPERATURE: 98 F | SYSTOLIC BLOOD PRESSURE: 100 MMHG | HEART RATE: 78 BPM | DIASTOLIC BLOOD PRESSURE: 58 MMHG | RESPIRATION RATE: 18 BRPM

## 2018-03-06 PROBLEM — K25.3 ACUTE GASTRIC ULCER WITHOUT HEMORRHAGE OR PERFORATION: Status: ACTIVE | Noted: 2018-03-06

## 2018-03-06 LAB
BASOPHILS # BLD AUTO: 0.01 K/UL
BASOPHILS NFR BLD: 0.2 %
DIFFERENTIAL METHOD: ABNORMAL
EOSINOPHIL # BLD AUTO: 0.2 K/UL
EOSINOPHIL NFR BLD: 3.2 %
ERYTHROCYTE [DISTWIDTH] IN BLOOD BY AUTOMATED COUNT: 13.3 %
HCT VFR BLD AUTO: 30.5 %
HGB BLD-MCNC: 10.3 G/DL
LYMPHOCYTES # BLD AUTO: 1.6 K/UL
LYMPHOCYTES NFR BLD: 27.3 %
MCH RBC QN AUTO: 29.9 PG
MCHC RBC AUTO-ENTMCNC: 33.8 G/DL
MCV RBC AUTO: 89 FL
MONOCYTES # BLD AUTO: 0.5 K/UL
MONOCYTES NFR BLD: 8.5 %
NEUTROPHILS # BLD AUTO: 3.6 K/UL
NEUTROPHILS NFR BLD: 60.6 %
PLATELET # BLD AUTO: 238 K/UL
PMV BLD AUTO: 9.1 FL
POCT GLUCOSE: 84 MG/DL (ref 70–110)
POCT GLUCOSE: 96 MG/DL (ref 70–110)
RBC # BLD AUTO: 3.44 M/UL
WBC # BLD AUTO: 5.97 K/UL

## 2018-03-06 PROCEDURE — 85025 COMPLETE CBC W/AUTO DIFF WBC: CPT

## 2018-03-06 PROCEDURE — 25000003 PHARM REV CODE 250: Performed by: INTERNAL MEDICINE

## 2018-03-06 PROCEDURE — 94761 N-INVAS EAR/PLS OXIMETRY MLT: CPT

## 2018-03-06 PROCEDURE — 36415 COLL VENOUS BLD VENIPUNCTURE: CPT

## 2018-03-06 RX ORDER — PANTOPRAZOLE SODIUM 40 MG/1
40 TABLET, DELAYED RELEASE ORAL 2 TIMES DAILY
Status: DISCONTINUED | OUTPATIENT
Start: 2018-03-06 | End: 2018-03-06 | Stop reason: HOSPADM

## 2018-03-06 RX ORDER — PANTOPRAZOLE SODIUM 40 MG/1
40 TABLET, DELAYED RELEASE ORAL 2 TIMES DAILY
Qty: 84 TABLET | Refills: 0 | Status: SHIPPED | OUTPATIENT
Start: 2018-03-06 | End: 2018-03-12 | Stop reason: SDUPTHER

## 2018-03-06 RX ADMIN — PANTOPRAZOLE SODIUM 40 MG: 40 TABLET, DELAYED RELEASE ORAL at 10:03

## 2018-03-06 NOTE — CONSULTS
BELTRAN met with pt to discuss Advance Directives. SW informed pt of the benefits, and informed pt they will be scanned to file once completed. Pt agreeable to Advance Directives, but only completes the information which states who will make healthcare decisions. Pt declined to sign sheet with the kind of medical treatment I would want. BELTRAN's Tata, and Maggie, witnessed and signed. Original copy provided to pt and a copy has been placed in chart.

## 2018-03-06 NOTE — PROGRESS NOTES
Upper GI Bleeding   Your upper gastrointestinal (GI) tract includes your esophagus, stomach, and upper small intestine. You have signs of bleeding from your upper GI tract. You may have vomited or coughed up blood or coffee-ground like material. Or you may have black or tarry stools. Very small amounts of GI bleeding may not be visible and can only be found by a test of the stool.  Causes of upper GI bleeding can include:  · Tear in the lining of the esophagus  · Enlarged veins in the esophagus  · An ulcer in the stomach or top of the small intestine  · Severe irritation of the stomach  · Inflammation of the digestive tract  Note: A bloody nose or mouth or dental problems may cause blood to be swallowed and vomited up again. This is not true GI bleeding. Iron supplements and medicines for diarrhea and upset stomach can cause black stools. This is not GI bleeding and is not a cause for concern.  Home care  Depending on the cause of your bleeding, care may include the following:  · You may be given medicines to help protect your GI tract, treat your problem, and promote healing. Take these as directed.  · Avoid NSAIDs, such as aspirin, ibuprofen, or naproxen. They can irritate the stomach and cause further bleeding. If you are taking these medicines for other medical reasons, talk to your healthcare provider before you stop them.    · Avoid alcohol, caffeine, and tobacco, which can delay healing and worsen your problem.  Follow-up care  Follow up with your healthcare provider as advised. Further tests may need to be done to find the cause of your bleeding

## 2018-03-06 NOTE — PLAN OF CARE
Problem: Fall Risk (Adult)  Goal: Absence of Falls  Patient will demonstrate the desired outcomes by discharge/transition of care.   Outcome: Ongoing (interventions implemented as appropriate)  Patient remains free of falls, call bell within reach, mother at bedside.    Problem: Pressure Ulcer Risk (Sixto Scale) (Adult,Obstetrics,Pediatric)  Goal: Skin Integrity  Patient will demonstrate the desired outcomes by discharge/transition of care.   Outcome: Ongoing (interventions implemented as appropriate)  Skin remains intact, patient turns and moves in bed without problems.

## 2018-03-06 NOTE — PROGRESS NOTES
WRITTEN HEALTHCARE DISCHARGE INFORMATION     Things that YOU are responsible for to Manage Your Care At Home:  1. Getting your prescriptions filled.  2. Taking you medications as directed. DO NOT MISS ANY DOSES!  3. Going to your follow-up doctor appointments. This is important because it allows the doctor to monitor your progress and to determine if any changes need to be made to your treatment plan.    If you are unable to make your follow up appointments, please call the number listed and reschedule this appointment.     After discharge, if you need assistance, you can call [facility] On [facility] Nurse [facility] Line for 24/7 assistance at [phone]    Thank you for choosing [facility] and allowing us to care for you. From your care manager; Carlota KRISHNAMURTHY RN, [place] @ [phone]     You should receive a call from [facility] within 48-72 hours to help manage your care after discharge. Please try to make sure that you answer your phone for this important phone call.     Follow-up Information     Deepa Anderson MD On 3/12/2018.    Specialty:  Family Medicine  Why:  Outpatient Services, PCP follow-up appointment. Patient should arrive by 2:40PM.   Contact information:  [address] [facility]  [place] [address]  [phone]             Gurvinder Rawls MD.    Specialty:  Gastroenterology  Why:  Nurse will contact patient to schedule appt for EGD in 6 weeks   Contact information:  [address] [place] [address]  [address]  [facility]  [place] [address] [phone]

## 2018-03-06 NOTE — PLAN OF CARE
"TN reviewed follow up appointment information as well as "Upper GI Bleeding and GI discharge instructions" handout with patient using teach back.  Patient is in agreement and verbalized an understanding. Placed discharge information in blue discharge folder.  TN also reviewed patient responsibility checklist with her using teach back. Patient was able to verbalize her responsibilities after discharge to manage her care at home bein. Going to follow up appointments   2. Picking up rx from the pharmacy when discharged  3. Taking her medications as prescribed        18 1350   Final Note   Assessment Type Final Discharge Note   Discharge Disposition Home   What phone number can be called within the next 1-3 days to see how you are doing after discharge? ([phone])   Hospital Follow Up  Appt(s) scheduled? Yes   Discharge plans and expectations educations in teach back method with documentation complete? Yes   Right Care Referral Info   Post Acute Recommendation No Care     "

## 2018-03-06 NOTE — PLAN OF CARE
Problem: Patient Care Overview  Goal: Plan of Care Review  Outcome: Ongoing (interventions implemented as appropriate)  All systems assessed. No falls or injuries this am. Will continue to monitor.

## 2018-03-06 NOTE — PROGRESS NOTES
TN attempted to arrange GI follow up appointment for repeat EGD in 6 weeks.  Awaiting callback for appointment. Spoke with Santy.

## 2018-03-07 PROBLEM — K25.0 ACUTE GASTRIC ULCER WITH HEMORRHAGE: Status: ACTIVE | Noted: 2018-03-06

## 2018-03-07 NOTE — ED PROVIDER NOTES
Encounter Date: 3/3/2018       History     Chief Complaint   Patient presents with    Emesis     Pt reports vomiting and diarrhea that started around 4 am.      42-year-old female who presents the emergency department with generalized abdominal cramping with associated nausea and vomiting and diarrhea since about 4 AM this morning - 4 hours prior to arrival.    reports questionable sushi earlier that night.  Otherwise no known precipitating or aggravating factors including sick contacts or recent travel.  Abdominal discomfort is currently a 2 out of 10.  She denies any known alleviating factors at this time.       The history is provided by the patient and the spouse.     Review of patient's allergies indicates:  No Known Allergies  Past Medical History:   Diagnosis Date    H/O albinism     Vision impairment     blindness/albinism      Past Surgical History:   Procedure Laterality Date    benign ovarian cyst      TONSILLECTOMY       Family History   Problem Relation Age of Onset    Fibrocystic breast disease Mother     Hyperlipidemia Mother     Hyperlipidemia Father     Polycythemia Maternal Aunt     Heart disease Maternal Grandfather     Hypertension Maternal Grandfather     Heart disease Paternal Grandfather     Hypertension Paternal Grandfather      Social History   Substance Use Topics    Smoking status: Never Smoker    Smokeless tobacco: Never Used    Alcohol use Yes      Comment: Socially, 6 beers weekly     Review of Systems   Constitutional: Positive for diaphoresis and fatigue. Negative for fever.   Respiratory: Negative for chest tightness and shortness of breath.    Cardiovascular: Negative for chest pain.   Gastrointestinal: Positive for abdominal pain (Hematemesis), diarrhea, nausea and vomiting. Negative for blood in stool.   Musculoskeletal: Negative for arthralgias, back pain, myalgias, neck pain and neck stiffness.   Skin: Positive for pallor.   Neurological: Positive for  syncope (presyncopal ) and light-headedness. Negative for seizures.   Hematological: Does not bruise/bleed easily.   Psychiatric/Behavioral: Negative for confusion.       Physical Exam     Initial Vitals [03/03/18 0725]   BP Pulse Resp Temp SpO2   (!) 98/55 85 18 98.1 °F (36.7 °C) 98 %      MAP       69.33         Physical Exam  Nursing note and vitals reviewed.  Constitutional: Uncomfortable nontoxic middle-aged female in no obvious distress.  HENT:    Head: NC/AT    Eyes: Conjunctivae normal.   (-) scleral icterus.              Mouth/Throat: Dry mucosal membranes..     Neck: Neck supple, normal rom.   Cardiovascular: RRR  Pulmonary/Chest: CTAB   Abdominal: Soft. ND/NT w/o guarding or rebound.  (-) CVA tenderness.  :  Hemoccult-positive stool.  Musculoskeletal: FROM of all major joints. No extremity edema or tenderness.  Neurological: A&Ox4, Normal speech.  No acute focal motor deficits.    Skin: Skin is pale and cool to touch  Psychiatric: normal mood and affect.        ED Course   Critical Care  Date/Time: 3/3/2018 9:28 AM  Performed by: MANUEL GUZMAN.  Authorized by: MANUEL GUZMAN.   Direct patient critical care time: 20 minutes  Additional history critical care time: 10 minutes  Ordering / reviewing critical care time: 10 minutes  Documentation critical care time: 10 minutes  Consulting other physicians critical care time: 5 minutes  Total critical care time (exclusive of procedural time) : 55 minutes  Critical care time was exclusive of separately billable procedures and treating other patients and teaching time.  Critical care was necessary to treat or prevent imminent or life-threatening deterioration of the following conditions: shock (GI bleed).  Critical care was time spent personally by me on the following activities: development of treatment plan with patient or surrogate, evaluation of patient's response to treatment, examination of patient, obtaining history from patient or surrogate,  ordering and performing treatments and interventions, ordering and review of laboratory studies, ordering and review of radiographic studies, pulse oximetry, re-evaluation of patient's condition and review of old charts.        Labs Reviewed   CBC W/ AUTO DIFFERENTIAL - Abnormal; Notable for the following:        Result Value    RBC 3.16 (*)     Hemoglobin 9.7 (*)     Hematocrit 28.5 (*)     MPV 9.0 (*)     Gran # (ANC) 8.5 (*)     Gran% 76.3 (*)     Lymph% 17.8 (*)     All other components within normal limits   COMPREHENSIVE METABOLIC PANEL - Abnormal; Notable for the following:     Glucose 171 (*)     BUN, Bld 33 (*)     Calcium 8.1 (*)     Total Protein 5.8 (*)     Albumin 3.2 (*)     Alkaline Phosphatase 41 (*)     All other components within normal limits   URINALYSIS - Abnormal; Notable for the following:     Occult Blood UA 3+ (*)     All other components within normal limits   LACTIC ACID, PLASMA - Abnormal; Notable for the following:     Lactate (Lactic Acid) 2.4 (*)     All other components within normal limits   LIPASE   PROTIME-INR   TROPONIN I   URINALYSIS MICROSCOPIC   POCT URINE PREGNANCY   TYPE & SCREEN        EKG Readings: (Independently interpreted)   Initial Reading: No STEMI.    - Normal sinus rhythm, rate 70, normal axis/intervals, no ST/T-wave abnormalities.      Imaging: (independent interpretation)  CT abdomen/pelvis:  No acute abnormality.     Differential diagnosis:   Initial differential diagnosis includes but is not limited to... Coagulopathy, upper versus lower GI bleed including possible peptic ulcer disease, AVM, diverticulosis, internal versus external hemorrhoids    Additional Medical Decision Makin-year-old female who presents the emergency department with generalized abdominal cramping with associated nausea, vomiting and diarrhea since about 4 AM this morning - 4 hours prior to arrival.   reports questionable bloody emesis ×1.   On exam, she appears uncomfortable  although nontoxic.  She has generalized nonspecific abdominal tenderness without guarding or rebound.  Rectal exam significant for Hemoccult-positive brown stool.  Initial vitals concerning for relative hypotension 98/55.  Remainder of her vital signs are reassuring - afebrile.  IV fluids and IV antiemetics have been given along with Protonix IVP.  In addition, she has been started on a Protonix drip and will be admitted to medicine for further evaluation and management.  She had a consulted inpatient.  At time of admission, vital signs continue to normalize.                              Clinical Impression:   The primary encounter diagnosis was Acute upper GI bleed. Diagnoses of Emesis, Syncope, unspecified syncope type, and Symptomatic anemia were also pertinent to this visit.    Disposition:   Disposition: Admitted  Condition: Fair                        Emmett Gomez MD  03/07/18 1132

## 2018-03-08 ENCOUNTER — TELEPHONE (OUTPATIENT)
Dept: FAMILY MEDICINE | Facility: CLINIC | Age: 43
End: 2018-03-08

## 2018-03-08 NOTE — DISCHARGE SUMMARY
"[facility] Medicine  Discharge Summary      Patient Name: Jody Loza  MRN: [id number]  Admission Date: 3/3/2018  Hospital Length of Stay: 3 days  Discharge Date and Time:  03/06/2018 6:51 PM  Attending Physician: Darlyn att. providers found   Discharging Provider: Ana Cain MD  Primary Care Provider: Elvia Judge MD      HPI:   Jody Loza is a 42 y.o. female with HX albinism who presents for evaluation of acute onset, painless, dark, coffee ground-like emesis 4+ that started around 2 am in the morning with associated diffuse acute abdominal sharp crampy pains, and 3-4 episodes of loose stool - no noticeable blood but "I didn't look back". NVD have resolved - abdominal pain improved. Patient reports eating sushi at the [employer] bar with about 6 beers around 2 pm that day. She drinks about 1 beer daily but endorses that she has been drinking a little more recently due to stress from [place] season. Additionally, endorses consistent use of BC powders for Headache. She currently has a headache. Lastly, she reports chronic anemia and history of 1 blood transfusion years ago after a minor surgery. Regular menstrual cycles that last about 3 days. Denies recent sick contacts or travel, fever, dysuria, or trauma.     Patient found to be anemic 8.9/25.8 at the time of presentation; AST/ALT/Lipase WNL - lactic acid level 2.4 - CT abdomen unrevealing    Procedure(s) (LRB):  ESOPHAGOGASTRODUODENOSCOPY (EGD) (N/A)      Hospital Course:   Ms Loza presented with upper GI bleed. NPO, IVFs and blood transfusion for Hgb of 6.8 g/dL. Responded well to 2U of PRBCs and remained hemodynamically stable. Underwent EGD on 3/5. This showed Normal esophagus, five non-bleeding gastric ulcers with no stigmata of bleeding (Biopsied) and normal examined duodenum. Counseled on NSAID/BC powder use and abstaining from alcohol. Is to continue PPI BID until repeat EGD as outpatient in 6 weeks to ensure healing. " Tolerated diet well. Independent and ambulatory. Advance diet as tolerated. Activity as tolerated.       Addendum: biopsy results with moderate chronic gastritis without acute activity and gastric metaplasia. Negative for H pylori    Consults:   Consults         Status Ordering Provider     Inpatient consult to Gastroenterology  Once     Provider:  Xander Mariscal MD    Completed MANUEL GUZMAN     Inpatient consult to Social Work/Case Management  Once     Provider:  (Not yet assigned)    Completed LYSSA COONEY        Final Active Diagnoses:    Diagnosis Date Noted POA    PRINCIPAL PROBLEM:  Acute upper GI bleed [K92.2] 03/03/2018 Yes    Acute gastric ulcer with hemorrhage [K25.0] 03/06/2018 Yes    Anemia due to acute blood loss [D62] 03/03/2018 Yes      Problems Resolved During this Admission:    Diagnosis Date Noted Date Resolved POA    Vision impairment [H54.7]  03/07/2018 Yes       Discharged Condition: good    Disposition: Home or Self Care    Follow Up:  Follow-up Information     Deepa Anderson MD On 3/12/2018.    Specialty:  Family Medicine  Why:  Outpatient Services, PCP follow-up appointment. Patient should arrive by 2:40PM.   Contact information:  [address] [facility] [address]  [phone]             Gurvinder Rawls MD.    Specialty:  Gastroenterology  Why:  Nurse will contact patient to schedule appt for EGD in 6 weeks   Contact information:  [address] [place] [address]  [id number]  [facility]  [place] [address] [phone]                   Medications:  Reconciled Home Medications:   Discharge Medication List as of 3/6/2018  1:54 PM      START taking these medications    Details   pantoprazole (PROTONIX) 40 MG tablet Take 1 tablet (40 mg total) by mouth 2 (two) times daily., Starting Tue 3/6/2018, Until Tue 4/17/2018, Normal             Indwelling Lines/Drains at time of discharge:   Lines/Drains/Airways          No matching active lines, drains, or  airways          Time spent on the discharge of patient: > 35 minutes  Patient was seen and examined on the date of discharge and determined to be suitable for discharge.         Ana Cain MD  Department of Hospital Medicine  [facility]

## 2018-03-08 NOTE — TELEPHONE ENCOUNTER
----- Message from Daniela Garcia sent at 3/8/2018  4:33 PM CST -----  Contact: [facility] [phone]   The pt was discharged home on pantoprazole (PROTONIX) 40 MG tablet. Formulary exception is needed for the patient. Please call at your earliest convenience.

## 2018-03-12 ENCOUNTER — OFFICE VISIT (OUTPATIENT)
Dept: FAMILY MEDICINE | Facility: CLINIC | Age: 43
End: 2018-03-12
Payer: MEDICARE

## 2018-03-12 VITALS
OXYGEN SATURATION: 98 % | DIASTOLIC BLOOD PRESSURE: 78 MMHG | SYSTOLIC BLOOD PRESSURE: 126 MMHG | HEIGHT: 62 IN | HEART RATE: 97 BPM | TEMPERATURE: 98 F

## 2018-03-12 DIAGNOSIS — K25.0 ACUTE GASTRIC ULCER WITH HEMORRHAGE: Primary | ICD-10-CM

## 2018-03-12 DIAGNOSIS — H92.03 EAR PAIN, BILATERAL: ICD-10-CM

## 2018-03-12 DIAGNOSIS — D62 ANEMIA DUE TO ACUTE BLOOD LOSS: ICD-10-CM

## 2018-03-12 DIAGNOSIS — K21.9 GASTROESOPHAGEAL REFLUX DISEASE WITHOUT ESOPHAGITIS: ICD-10-CM

## 2018-03-12 PROCEDURE — 99213 OFFICE O/P EST LOW 20 MIN: CPT | Mod: S$GLB,,, | Performed by: FAMILY MEDICINE

## 2018-03-12 PROCEDURE — 99999 PR PBB SHADOW E&M-EST. PATIENT-LVL III: CPT | Mod: PBBFAC,,, | Performed by: FAMILY MEDICINE

## 2018-03-12 RX ORDER — PANTOPRAZOLE SODIUM 40 MG/1
40 TABLET, DELAYED RELEASE ORAL 2 TIMES DAILY
Qty: 180 TABLET | Refills: 1 | Status: SHIPPED | OUTPATIENT
Start: 2018-03-12 | End: 2019-02-12 | Stop reason: SDUPTHER

## 2018-03-12 RX ORDER — FERROUS SULFATE 325(65) MG
325 TABLET ORAL
Qty: 90 TABLET | Refills: 3 | Status: SHIPPED | OUTPATIENT
Start: 2018-03-12 | End: 2021-02-22

## 2018-03-12 NOTE — PATIENT INSTRUCTIONS
Lifestyle Changes for Controlling GERD  When you have GERD, stomach acid feels as if its backing up toward your mouth. Whether or not you take medicine to control your GERD, your symptoms can often be improved with lifestyle changes. Talk to your healthcare provider about the following suggestions. These suggestions may help you get relief from your symptoms.      Raise your head  Reflux is more likely to strike when youre lying down flat, because stomach fluid can flow backward more easily. Raising the head of your bed 4 to 6 inches can help. To do this:  · Slide blocks or books under the legs at the head of your bed. Or, place a wedge under the mattress. Many [employer] can make a suitable wedge for you. The wedge should run from your waist to the top of your head.  · Dont just prop your head on several pillows. This increases pressure on your stomach. It can make GERD worse.  Watch your eating habits  Certain foods may increase the acid in your stomach or relax the lower esophageal sphincter. This makes GERD more likely. Its best to avoid the following if they cause you symptoms:  · Coffee, tea, and carbonated drinks (with and without caffeine)  · Fatty, fried, or spicy food  · Mint, chocolate, onions, and tomatoes  · Peppermint  · Any other foods that seem to irritate your stomach or cause you pain  Relieve the pressure  Tips include the following:  · Eat smaller meals, even if you have to eat more often.  · Dont lie down right after you eat. Wait a few hours for your stomach to empty.  · Avoid tight belts and tight-fitting clothes.  · Lose excess weight.  Tobacco and alcohol  Avoid smoking tobacco and drinking alcohol. They can make GERD symptoms worse.  Date Last Reviewed: 7/1/2016  © 0217-2402 [employer]. [address] [place] [address], [place] [address]. All rights reserved. This information is not intended as a substitute for professional medical care. Always follow your healthcare  professional's instructions.

## 2018-03-12 NOTE — PROGRESS NOTES
Chief Complaint   Patient presents with    Hospital Follow Up       HPI    Jody Loza is 42 y.o. female. The primary encounter diagnosis was Acute gastric ulcer with hemorrhage. Diagnoses of Anemia due to acute blood loss, Gastroesophageal reflux disease without esophagitis, and Ear pain, bilateral were also pertinent to this visit.    42 year old female comes to clinic for hospital follow up.  Patient reports presenting to the ED due to loss of consciousness due to blood loss.  Patient admits to diarrhea and emesis 2 times prior to presenting to the ED.  She reports at that time the emesis was dark with blood clots and her stools where extremely black.  She did not realize at the time that it was blood.  Patient  witness her passing out.  She was transported by EMS.  Prior to all of her symptoms she does recall heartburn and thoracic back pain which she did not attribute to GERD or gastric ulcer.      Since discharge she has had no further episodes of bleeding.  She has been compliant with medications recommended at discharge.  She questions appropriate diet.  She also inquires about alcohol and coffee consumption.  Patient and  admit to patient drinking more than 3-4 cups of coffee per day in addition to multiple cafeinated beverages/sodas per day.    She has not obtained follow up with GI.  She was informed to have repeat EGD completed in 6 weeks.    Review of Systems   Constitutional: Negative for activity change.   Respiratory: Negative for shortness of breath.    Cardiovascular: Negative for chest pain.   Gastrointestinal: Negative for abdominal distention, abdominal pain, anal bleeding, blood in stool, constipation, nausea and vomiting.   Musculoskeletal: Negative for gait problem.   Neurological: Positive for weakness. Negative for dizziness and syncope.   Psychiatric/Behavioral: Negative for suicidal ideas.       Past Medical History:   Diagnosis Date    H/O albinism     Vision  "impairment     blindness/albinism      Past Surgical History:   Procedure Laterality Date    benign ovarian cyst      TONSILLECTOMY       Family History   Problem Relation Age of Onset    Fibrocystic breast disease Mother     Hyperlipidemia Mother     Hyperlipidemia Father     Polycythemia Maternal Aunt     Heart disease Maternal Grandfather     Hypertension Maternal Grandfather     Heart disease Paternal Grandfather     Hypertension Paternal Grandfather       reports that she has never smoked. She has never used smokeless tobacco. She reports that she drinks alcohol. She reports that she does not use drugs.  Review of patient's allergies indicates:  No Known Allergies      Current Outpatient Prescriptions:     ferrous sulfate 325 mg (65 mg iron) Tab tablet, Take 1 tablet (325 mg total) by mouth daily with breakfast., Disp: 90 tablet, Rfl: 3    pantoprazole (PROTONIX) 40 MG tablet, Take 1 tablet (40 mg total) by mouth 2 (two) times daily., Disp: 180 tablet, Rfl: 1        Blood pressure 126/78, pulse 97, temperature 98.4 °F (36.9 °C), temperature source Oral, height 5' 2" (1.575 m), last menstrual period 02/22/2018, SpO2 98 %.    Physical Exam   Constitutional: Vital signs are normal. She appears well-developed.   HENT:   Mouth/Throat: Normal dentition.   Neck: Trachea normal. No thyromegaly present.   Cardiovascular: Normal rate, regular rhythm and intact distal pulses.    No murmur heard.  Pulmonary/Chest: Effort normal. She has no decreased breath sounds. She has no wheezes. She exhibits no deformity.   Musculoskeletal:   Normal gait. No decreased range of motion of major joints.   Neurological: She is not disoriented.   Skin: Skin is intact. Capillary refill takes less than 2 seconds.   Psychiatric: Her speech is normal and behavior is normal. Her mood appears not anxious. She does not exhibit a depressed mood.       Admission on 03/03/2018, Discharged on 03/06/2018   Component Date Value Ref Range " Status    WBC 03/03/2018 11.19  3.90 - 12.70 K/uL Final    RBC 03/03/2018 3.16* 4.00 - 5.40 M/uL Final    Hemoglobin 03/03/2018 9.7* 12.0 - 16.0 g/dL Final    Hematocrit 03/03/2018 28.5* 37.0 - 48.5 % Final    MCV 03/03/2018 90  82 - 98 fL Final    MCH 03/03/2018 30.7  27.0 - 31.0 pg Final    MCHC 03/03/2018 34.0  32.0 - 36.0 g/dL Final    RDW 03/03/2018 12.0  11.5 - 14.5 % Final    Platelets 03/03/2018 313  150 - 350 K/uL Final    MPV 03/03/2018 9.0* 9.2 - 12.9 fL Final    Gran # (ANC) 03/03/2018 8.5* 1.8 - 7.7 K/uL Final    Lymph # 03/03/2018 2.0  1.0 - 4.8 K/uL Final    Mono # 03/03/2018 0.6  0.3 - 1.0 K/uL Final    Eos # 03/03/2018 0.1  0.0 - 0.5 K/uL Final    Baso # 03/03/2018 0.02  0.00 - 0.20 K/uL Final    Gran% 03/03/2018 76.3* 38.0 - 73.0 % Final    Lymph% 03/03/2018 17.8* 18.0 - 48.0 % Final    Mono% 03/03/2018 5.1  4.0 - 15.0 % Final    Eosinophil% 03/03/2018 0.4  0.0 - 8.0 % Final    Basophil% 03/03/2018 0.2  0.0 - 1.9 % Final    Differential Method 03/03/2018 Automated   Final    Sodium 03/03/2018 140  136 - 145 mmol/L Final    Potassium 03/03/2018 4.3  3.5 - 5.1 mmol/L Final    Chloride 03/03/2018 108  95 - 110 mmol/L Final    CO2 03/03/2018 24  23 - 29 mmol/L Final    Glucose 03/03/2018 171* 70 - 110 mg/dL Final    BUN, Bld 03/03/2018 33* 6 - 20 mg/dL Final    Creatinine 03/03/2018 0.8  0.5 - 1.4 mg/dL Final    Calcium 03/03/2018 8.1* 8.7 - 10.5 mg/dL Final    Total Protein 03/03/2018 5.8* 6.0 - 8.4 g/dL Final    Albumin 03/03/2018 3.2* 3.5 - 5.2 g/dL Final    Total Bilirubin 03/03/2018 0.4  0.1 - 1.0 mg/dL Final    Alkaline Phosphatase 03/03/2018 41* 55 - 135 U/L Final    AST 03/03/2018 12  10 - 40 U/L Final    ALT 03/03/2018 11  10 - 44 U/L Final    Anion Gap 03/03/2018 8  8 - 16 mmol/L Final    eGFR if African American 03/03/2018 >60  >60 mL/min/1.73 m^2 Final    eGFR if non African American 03/03/2018 >60  >60 mL/min/1.73 m^2 Final    Lipase 03/03/2018 39  4  - 60 U/L Final    Specimen UA 03/03/2018 Urine, Clean Catch   Final    Color, UA 03/03/2018 Yellow  Yellow, Straw, Ruchi Final    Appearance, UA 03/03/2018 Clear  Clear Final    pH, UA 03/03/2018 7.0  5.0 - 8.0 Final    Specific Gravity, UA 03/03/2018 1.015  1.005 - 1.030 Final    Protein, UA 03/03/2018 Negative  Negative Final    Glucose, UA 03/03/2018 Negative  Negative Final    Ketones, UA 03/03/2018 Negative  Negative Final    Bilirubin (UA) 03/03/2018 Negative  Negative Final    Occult Blood UA 03/03/2018 3+* Negative Final    Nitrite, UA 03/03/2018 Negative  Negative Final    Urobilinogen, UA 03/03/2018 Negative  <2.0 EU/dL Final    Leukocytes, UA 03/03/2018 Negative  Negative Final    POC Preg Test, Ur 03/03/2018 Negative  Negative Final     Acceptable 03/03/2018 Yes   Final    Prothrombin Time 03/03/2018 11.7  9.0 - 12.5 sec Final    INR 03/03/2018 1.1  0.8 - 1.2 Final    Troponin I 03/03/2018 <0.006  0.000 - 0.026 ng/mL Final    Lactate (Lactic Acid) 03/03/2018 2.4* 0.5 - 2.2 mmol/L Final    Group & Rh 03/03/2018 A POS   Final    Indirect Azeb 03/03/2018 NEG   Final    RBC, UA 03/03/2018 3  0 - 4 /hpf Final    WBC, UA 03/03/2018 4  0 - 5 /hpf Final    Bacteria, UA 03/03/2018 Rare  None-Occ /hpf Final    Squam Epithel, UA 03/03/2018 5  /hpf Final    Microscopic Comment 03/03/2018 SEE COMMENT   Final    Hemoglobin 03/03/2018 8.9* 12.0 - 16.0 g/dL Final    Hematocrit 03/03/2018 25.8* 37.0 - 48.5 % Final    POCT Glucose 03/03/2018 173* 70 - 110 mg/dL Final    Hemoglobin 03/03/2018 7.2* 12.0 - 16.0 g/dL Final    Hematocrit 03/03/2018 20.4* 37.0 - 48.5 % Final    WBC 03/04/2018 5.85  3.90 - 12.70 K/uL Final    RBC 03/04/2018 2.39* 4.00 - 5.40 M/uL Final    Hemoglobin 03/04/2018 7.3* 12.0 - 16.0 g/dL Final    Hematocrit 03/04/2018 20.4* 37.0 - 48.5 % Final    MCV 03/04/2018 85  82 - 98 fL Final    MCH 03/04/2018 30.5  27.0 - 31.0 pg Final    MCHC 03/04/2018  35.8  32.0 - 36.0 g/dL Final    RDW 03/04/2018 12.6  11.5 - 14.5 % Final    Platelets 03/04/2018 103* 150 - 350 K/uL Final    MPV 03/04/2018 9.7  9.2 - 12.9 fL Final    Gran # (ANC) 03/04/2018 3.1  1.8 - 7.7 K/uL Final    Lymph # 03/04/2018 2.3  1.0 - 4.8 K/uL Final    Mono # 03/04/2018 0.4  0.3 - 1.0 K/uL Final    Eos # 03/04/2018 0.1  0.0 - 0.5 K/uL Final    Baso # 03/04/2018 0.01  0.00 - 0.20 K/uL Final    Gran% 03/04/2018 53.6  38.0 - 73.0 % Final    Lymph% 03/04/2018 38.5  18.0 - 48.0 % Final    Mono% 03/04/2018 6.8  4.0 - 15.0 % Final    Eosinophil% 03/04/2018 0.9  0.0 - 8.0 % Final    Basophil% 03/04/2018 0.2  0.0 - 1.9 % Final    Differential Method 03/04/2018 Automated   Final    Sodium 03/04/2018 143  136 - 145 mmol/L Final    Potassium 03/04/2018 4.1  3.5 - 5.1 mmol/L Final    Chloride 03/04/2018 117* 95 - 110 mmol/L Final    CO2 03/04/2018 17* 23 - 29 mmol/L Final    Glucose 03/04/2018 90  70 - 110 mg/dL Final    BUN, Bld 03/04/2018 11  6 - 20 mg/dL Final    Creatinine 03/04/2018 0.7  0.5 - 1.4 mg/dL Final    Calcium 03/04/2018 7.4* 8.7 - 10.5 mg/dL Final    Anion Gap 03/04/2018 9  8 - 16 mmol/L Final    eGFR if African American 03/04/2018 >60  >60 mL/min/1.73 m^2 Final    eGFR if non African American 03/04/2018 >60  >60 mL/min/1.73 m^2 Final    Hemoglobin 03/04/2018 7.4* 12.0 - 16.0 g/dL Final    Hematocrit 03/04/2018 20.8* 37.0 - 48.5 % Final    Hemoglobin 03/04/2018 6.8* 12.0 - 16.0 g/dL Final    Hematocrit 03/04/2018 19.8* 37.0 - 48.5 % Final    UNIT NUMBER 03/03/2018 [id number]   Final    PRODUCT CODE 03/03/2018 [id number]   Final    DISPENSE STATUS 03/03/2018 TRANSFUSED   Final    CODING SYSTEM 03/03/2018 [id number]   Final    Unit Blood Type Code 03/03/2018 6200   Final    Unit Blood Type 03/03/2018 A POS   Final    Unit Expiration 03/03/2018 [id number]   Final    UNIT NUMBER 03/03/2018 [id number]   Final    PRODUCT CODE 03/03/2018 [id number]   Final     DISPENSE STATUS 03/03/2018 TRANSFUSED   Final    CODING SYSTEM 03/03/2018 [id number]   Final    Unit Blood Type Code 03/03/2018 6200   Final    Unit Blood Type 03/03/2018 A POS   Final    Unit Expiration 03/03/2018 [id number]   Final    POCT Glucose 03/05/2018 96  70 - 110 mg/dL Final    WBC 03/05/2018 7.91  3.90 - 12.70 K/uL Final    RBC 03/05/2018 3.38* 4.00 - 5.40 M/uL Final    Hemoglobin 03/05/2018 10.1* 12.0 - 16.0 g/dL Final    Hematocrit 03/05/2018 30.1* 37.0 - 48.5 % Final    MCV 03/05/2018 89  82 - 98 fL Final    MCH 03/05/2018 29.9  27.0 - 31.0 pg Final    MCHC 03/05/2018 33.6  32.0 - 36.0 g/dL Final    RDW 03/05/2018 13.0  11.5 - 14.5 % Final    Platelets 03/05/2018 222  150 - 350 K/uL Final    MPV 03/05/2018 8.9* 9.2 - 12.9 fL Final    Gran # (ANC) 03/05/2018 5.8  1.8 - 7.7 K/uL Final    Lymph # 03/05/2018 1.5  1.0 - 4.8 K/uL Final    Mono # 03/05/2018 0.5  0.3 - 1.0 K/uL Final    Eos # 03/05/2018 0.1  0.0 - 0.5 K/uL Final    Baso # 03/05/2018 0.01  0.00 - 0.20 K/uL Final    Gran% 03/05/2018 73.9* 38.0 - 73.0 % Final    Lymph% 03/05/2018 19.1  18.0 - 48.0 % Final    Mono% 03/05/2018 5.7  4.0 - 15.0 % Final    Eosinophil% 03/05/2018 1.1  0.0 - 8.0 % Final    Basophil% 03/05/2018 0.1  0.0 - 1.9 % Final    Differential Method 03/05/2018 Automated   Final    Hemoglobin A1C 03/05/2018 5.0  4.0 - 5.6 % Final    Estimated Avg Glucose 03/05/2018 97  68 - 131 mg/dL Final    POCT Glucose 03/06/2018 96  70 - 110 mg/dL Final    WBC 03/06/2018 5.97  3.90 - 12.70 K/uL Final    RBC 03/06/2018 3.44* 4.00 - 5.40 M/uL Final    Hemoglobin 03/06/2018 10.3* 12.0 - 16.0 g/dL Final    Hematocrit 03/06/2018 30.5* 37.0 - 48.5 % Final    MCV 03/06/2018 89  82 - 98 fL Final    MCH 03/06/2018 29.9  27.0 - 31.0 pg Final    MCHC 03/06/2018 33.8  32.0 - 36.0 g/dL Final    RDW 03/06/2018 13.3  11.5 - 14.5 % Final    Platelets 03/06/2018 238  150 - 350 K/uL Final    MPV 03/06/2018 9.1* 9.2 - 12.9 fL  Final    Gran # (ANC) 03/06/2018 3.6  1.8 - 7.7 K/uL Final    Lymph # 03/06/2018 1.6  1.0 - 4.8 K/uL Final    Mono # 03/06/2018 0.5  0.3 - 1.0 K/uL Final    Eos # 03/06/2018 0.2  0.0 - 0.5 K/uL Final    Baso # 03/06/2018 0.01  0.00 - 0.20 K/uL Final    Gran% 03/06/2018 60.6  38.0 - 73.0 % Final    Lymph% 03/06/2018 27.3  18.0 - 48.0 % Final    Mono% 03/06/2018 8.5  4.0 - 15.0 % Final    Eosinophil% 03/06/2018 3.2  0.0 - 8.0 % Final    Basophil% 03/06/2018 0.2  0.0 - 1.9 % Final    Differential Method 03/06/2018 Automated   Final    POCT Glucose 03/06/2018 84  70 - 110 mg/dL Final   Office Visit on 01/09/2018   Component Date Value Ref Range Status    Rapid Influenza A Ag 01/09/2018 Positive* Negative Final    Rapid Influenza B Ag 01/09/2018 Negative  Negative Final     Acceptable 01/09/2018 Yes   Final   ]    ASSESSMENT:    1. Acute gastric ulcer with hemorrhage    2. Anemia due to acute blood loss    3. Gastroesophageal reflux disease without esophagitis    4. Ear pain, bilateral        Jody was seen today for hospital follow up.    Diagnoses and all orders for this visit:    Acute gastric ulcer with hemorrhage  -     pantoprazole (PROTONIX) 40 MG tablet; Take 1 tablet (40 mg total) by mouth 2 (two) times daily.  -     CBC auto differential; Future  -     Ambulatory referral to Gastroenterology  - New problem.  Gastritis with hemorrhage now resolved. Continue PPI 2 times daily.  - Urgent referral to GI to establish and for hospital follow up placed.    Anemia due to acute blood loss  -     CBC auto differential; Future  -     ferrous sulfate 325 mg (65 mg iron) Tab tablet; Take 1 tablet (325 mg total) by mouth daily with breakfast.  - New problem.  Obtain CBC.  Patient instructed to continue iron supplementation until H/H within normal range and signs of hemorrhage have resolved.    Gastroesophageal reflux disease without esophagitis  -     pantoprazole (PROTONIX) 40 MG tablet; Take 1  tablet (40 mg total) by mouth 2 (two) times daily.  - Stable. Patient with apparent long history and untreated. Continue PPI and establish with GI.  - Pathology from inpatient EGD negative for H. Pylori.    Ear pain, bilateral   -New problem.  No sign of infection or effusion. Treat seasonal/environmental allergens.        FOLLOW UP: Follow-up in about 4 weeks (around 4/9/2018) for Follow up.

## 2018-03-28 ENCOUNTER — OFFICE VISIT (OUTPATIENT)
Dept: FAMILY MEDICINE | Facility: CLINIC | Age: 43
End: 2018-03-28
Payer: MEDICARE

## 2018-03-28 ENCOUNTER — LAB VISIT (OUTPATIENT)
Dept: LAB | Facility: HOSPITAL | Age: 43
End: 2018-03-28
Attending: FAMILY MEDICINE
Payer: MEDICARE

## 2018-03-28 VITALS
OXYGEN SATURATION: 97 % | HEIGHT: 62 IN | SYSTOLIC BLOOD PRESSURE: 112 MMHG | HEART RATE: 86 BPM | BODY MASS INDEX: 31.16 KG/M2 | TEMPERATURE: 98 F | DIASTOLIC BLOOD PRESSURE: 76 MMHG | WEIGHT: 169.31 LBS

## 2018-03-28 DIAGNOSIS — K25.0 ACUTE GASTRIC ULCER WITH HEMORRHAGE: Primary | ICD-10-CM

## 2018-03-28 DIAGNOSIS — K25.0 ACUTE GASTRIC ULCER WITH HEMORRHAGE: ICD-10-CM

## 2018-03-28 DIAGNOSIS — K21.9 GASTROESOPHAGEAL REFLUX DISEASE WITHOUT ESOPHAGITIS: ICD-10-CM

## 2018-03-28 LAB
BASOPHILS # BLD AUTO: 0.01 K/UL
BASOPHILS NFR BLD: 0.1 %
DIFFERENTIAL METHOD: ABNORMAL
EOSINOPHIL # BLD AUTO: 0.1 K/UL
EOSINOPHIL NFR BLD: 1.3 %
ERYTHROCYTE [DISTWIDTH] IN BLOOD BY AUTOMATED COUNT: 13.2 %
HCT VFR BLD AUTO: 36.5 %
HGB BLD-MCNC: 12.5 G/DL
LYMPHOCYTES # BLD AUTO: 1.9 K/UL
LYMPHOCYTES NFR BLD: 24.9 %
MCH RBC QN AUTO: 30.3 PG
MCHC RBC AUTO-ENTMCNC: 34.2 G/DL
MCV RBC AUTO: 89 FL
MONOCYTES # BLD AUTO: 0.6 K/UL
MONOCYTES NFR BLD: 7.5 %
NEUTROPHILS # BLD AUTO: 5.1 K/UL
NEUTROPHILS NFR BLD: 66.2 %
PLATELET # BLD AUTO: 236 K/UL
PMV BLD AUTO: 8.8 FL
RBC # BLD AUTO: 4.12 M/UL
WBC # BLD AUTO: 7.63 K/UL

## 2018-03-28 PROCEDURE — 99999 PR PBB SHADOW E&M-EST. PATIENT-LVL III: CPT | Mod: PBBFAC,,, | Performed by: FAMILY MEDICINE

## 2018-03-28 PROCEDURE — 36415 COLL VENOUS BLD VENIPUNCTURE: CPT

## 2018-03-28 PROCEDURE — 99214 OFFICE O/P EST MOD 30 MIN: CPT | Mod: S$GLB,,, | Performed by: FAMILY MEDICINE

## 2018-03-28 PROCEDURE — 85025 COMPLETE CBC W/AUTO DIFF WBC: CPT

## 2018-03-28 NOTE — PATIENT INSTRUCTIONS
When You Have Gastrointestinal (GI) Bleeding    Blood in your vomit or stool can be a sign of gastrointestinal (GI) bleeding. GI bleeding can be scary. But the cause may not be serious. You should always see a doctor if GI bleeding occurs.  The GI tract  The GI tract is the path through which food travels in the body. Food passes from the mouth down the esophagus (the tube from the mouth to the stomach). Food begins to break down in the stomach. It then moves through the duodenum, the first part of the small intestine. Nutrients are absorbed as food travels through the small intestine. What is left passes into the colon (large intestine) as waste. The colon removes water from the waste. Waste continues from the colon to the rectum (where stool is stored). Waste then leaves the body through the anus.  Causes of GI bleeding  GI bleeding can be caused by many different problems. Some of the more common causes include:  · Swollen veins in the anus (hemorrhoids)  · Swollen veins in the esophagus (varices)  · Sore on the lining of the GI tract (ulcer)  · Cuts or scrapes in the mouth or throat  · Infection caused by germs such as bacteria or parasites  · Food allergies, such as milk allergy in young children  · Medicines  · Inflammation of the GI tract (gastritis or esophagitis)  · Colitis (Crohn's disease or ulcerative colitis)  · Cancer (tumors or polyps)  · Abnormal pouches in the colon (diverticula)  · Tears in the esophagus or anus  · Nosebleed  · Abnormal blood vessels in the GI tract (angiodysplasia)  Diagnosing the cause of blood in stool  If blood is coming out in your stool, you may have a lower GI tract problem or a very fast upper GI tract bleed. Bleeding from the GI tract can be bright red. Or it may look dark and tarry. Tests may also find blood in your stool that cant be seen with the eye (occult blood). To find out the cause, tests that may be ordered include:  · Blood tests. A blood sample is taken and  sent to a lab for exam.  · Hemoccult test. Checks a stool sample for blood.  · Stool culture. Checks a stool sample for bacteria or parasites.  · X-ray, ultrasound, or CT scan. Imaging tests that take pictures of the digestive tract.  · Colonoscopy or sigmoidoscopy. This test uses a flexible tube with a tiny camera. The tube is inserted through your anus into your rectum to see the inside of your colon. Your provider can also take a tiny tissue sample (biopsy) and treat a bleeding source  Diagnosing the cause of blood in vomit  If you are vomiting blood or something that looks like coffee grounds, you may have an upper GI tract problem. To find the cause, tests that may be done include:  · Upper Endoscopy. A flexible tube with a tiny camera is inserted through your mouth and throat to see inside your upper GI tract. This lets your provider take a tiny tissue sample (biopsy) and treat a bleeding source.  · Nasogastric lavage. This can tell if you have upper GI or lower GI bleeding.  · X-ray, ultrasound, or CT scan. Imaging tests that take pictures of your digestive tract.  · Upper GI series. X-rays of the upper part of your GI tract taken from inside your body.  · Enteroscopy. This sends a flexible tube or a small, swallowed capsule camera into your small intestine.  When to call your healthcare provider  Call your healthcare provider right away if you have any of the following:  · Bleeding from your mouth or anus that can't be stopped  · Fever of 100.4°F (38.0°) or higher  · Bleeding along with feeling lightheaded or dizzy  · Signs of fluid loss (dehydration). These include a dry, sticky mouth, decreased urine output; and very dark urine.  · Belly (abdominal) pain   Date Last Reviewed: 7/1/2016  © 3729-9332 [employer]. [address] [place] [address], [place] [address]. All rights reserved. This information is not intended as a substitute for professional medical care. Always follow your healthcare  professional's instructions.

## 2018-03-28 NOTE — PROGRESS NOTES
"Chief Complaint   Patient presents with    Follow-up       HPI    Jody Loza is 42 y.o. female. The primary encounter diagnosis was Acute gastric ulcer with hemorrhage. A diagnosis of Gastroesophageal reflux disease without esophagitis was also pertinent to this visit.    42 year old woman with recent history of acute upper GI bleed returns to clinic for follow up.  Patient reports last week feeling improved.  About 4 days ago she slowly began to feel worse.  Patient notes the onset of abdominal pain, new episode of dark stools, and dizziness.  Over the last 24 hours her symptoms have improved.  She notes strict adherence to diet.  She has been taking medications as prescribed.  She is concerned that the PPI may be causing dizziness.    Patient reports being scheduled for colonoscopy on April 30th and will see the GI doctor on April 20th.     Review of Systems   Constitutional: Negative for activity change.   Respiratory: Positive for chest tightness. Negative for shortness of breath and wheezing.    Cardiovascular: Negative for chest pain and palpitations.   Gastrointestinal: Positive for abdominal pain, blood in stool, constipation, diarrhea, nausea and vomiting. Negative for abdominal distention and rectal pain.   Musculoskeletal: Negative for gait problem.   Neurological: Positive for dizziness and weakness. Negative for tremors, speech difficulty and light-headedness.   Psychiatric/Behavioral: Negative for suicidal ideas.           Current Outpatient Prescriptions:     ferrous sulfate 325 mg (65 mg iron) Tab tablet, Take 1 tablet (325 mg total) by mouth daily with breakfast., Disp: 90 tablet, Rfl: 3    pantoprazole (PROTONIX) 40 MG tablet, Take 1 tablet (40 mg total) by mouth 2 (two) times daily., Disp: 180 tablet, Rfl: 1      Blood pressure 112/76, pulse 86, temperature 98.1 °F (36.7 °C), temperature source Oral, height 5' 2" (1.575 m), weight 76.8 kg (169 lb 5 oz), last menstrual period 02/24/2018, SpO2 " 97 %.    Physical Exam   Constitutional: Vital signs are normal. She appears well-developed and well-nourished. She does not appear ill. No distress.       Lab Visit on 03/28/2018   Component Date Value Ref Range Status    WBC 03/28/2018 7.63  3.90 - 12.70 K/uL Final    RBC 03/28/2018 4.12  4.00 - 5.40 M/uL Final    Hemoglobin 03/28/2018 12.5  12.0 - 16.0 g/dL Final    Hematocrit 03/28/2018 36.5* 37.0 - 48.5 % Final    MCV 03/28/2018 89  82 - 98 fL Final    MCH 03/28/2018 30.3  27.0 - 31.0 pg Final    MCHC 03/28/2018 34.2  32.0 - 36.0 g/dL Final    RDW 03/28/2018 13.2  11.5 - 14.5 % Final    Platelets 03/28/2018 236  150 - 350 K/uL Final    MPV 03/28/2018 8.8* 9.2 - 12.9 fL Final    Gran # (ANC) 03/28/2018 5.1  1.8 - 7.7 K/uL Final    Lymph # 03/28/2018 1.9  1.0 - 4.8 K/uL Final    Mono # 03/28/2018 0.6  0.3 - 1.0 K/uL Final    Eos # 03/28/2018 0.1  0.0 - 0.5 K/uL Final    Baso # 03/28/2018 0.01  0.00 - 0.20 K/uL Final    Gran% 03/28/2018 66.2  38.0 - 73.0 % Final    Lymph% 03/28/2018 24.9  18.0 - 48.0 % Final    Mono% 03/28/2018 7.5  4.0 - 15.0 % Final    Eosinophil% 03/28/2018 1.3  0.0 - 8.0 % Final    Basophil% 03/28/2018 0.1  0.0 - 1.9 % Final    Differential Method 03/28/2018 Automated   Final   Admission on 03/03/2018, Discharged on 03/06/2018   Component Date Value Ref Range Status    WBC 03/03/2018 11.19  3.90 - 12.70 K/uL Final    RBC 03/03/2018 3.16* 4.00 - 5.40 M/uL Final    Hemoglobin 03/03/2018 9.7* 12.0 - 16.0 g/dL Final    Hematocrit 03/03/2018 28.5* 37.0 - 48.5 % Final    MCV 03/03/2018 90  82 - 98 fL Final    MCH 03/03/2018 30.7  27.0 - 31.0 pg Final    MCHC 03/03/2018 34.0  32.0 - 36.0 g/dL Final    RDW 03/03/2018 12.0  11.5 - 14.5 % Final    Platelets 03/03/2018 313  150 - 350 K/uL Final    MPV 03/03/2018 9.0* 9.2 - 12.9 fL Final    Gran # (ANC) 03/03/2018 8.5* 1.8 - 7.7 K/uL Final    Lymph # 03/03/2018 2.0  1.0 - 4.8 K/uL Final    Mono # 03/03/2018 0.6  0.3 - 1.0  K/uL Final    Eos # 03/03/2018 0.1  0.0 - 0.5 K/uL Final    Baso # 03/03/2018 0.02  0.00 - 0.20 K/uL Final    Gran% 03/03/2018 76.3* 38.0 - 73.0 % Final    Lymph% 03/03/2018 17.8* 18.0 - 48.0 % Final    Mono% 03/03/2018 5.1  4.0 - 15.0 % Final    Eosinophil% 03/03/2018 0.4  0.0 - 8.0 % Final    Basophil% 03/03/2018 0.2  0.0 - 1.9 % Final    Differential Method 03/03/2018 Automated   Final    Sodium 03/03/2018 140  136 - 145 mmol/L Final    Potassium 03/03/2018 4.3  3.5 - 5.1 mmol/L Final    Chloride 03/03/2018 108  95 - 110 mmol/L Final    CO2 03/03/2018 24  23 - 29 mmol/L Final    Glucose 03/03/2018 171* 70 - 110 mg/dL Final    BUN, Bld 03/03/2018 33* 6 - 20 mg/dL Final    Creatinine 03/03/2018 0.8  0.5 - 1.4 mg/dL Final    Calcium 03/03/2018 8.1* 8.7 - 10.5 mg/dL Final    Total Protein 03/03/2018 5.8* 6.0 - 8.4 g/dL Final    Albumin 03/03/2018 3.2* 3.5 - 5.2 g/dL Final    Total Bilirubin 03/03/2018 0.4  0.1 - 1.0 mg/dL Final    Alkaline Phosphatase 03/03/2018 41* 55 - 135 U/L Final    AST 03/03/2018 12  10 - 40 U/L Final    ALT 03/03/2018 11  10 - 44 U/L Final    Anion Gap 03/03/2018 8  8 - 16 mmol/L Final    eGFR if African American 03/03/2018 >60  >60 mL/min/1.73 m^2 Final    eGFR if non African American 03/03/2018 >60  >60 mL/min/1.73 m^2 Final    Lipase 03/03/2018 39  4 - 60 U/L Final    Specimen UA 03/03/2018 Urine, Clean Catch   Final    Color, UA 03/03/2018 Yellow  Yellow, Straw, Ruchi Final    Appearance, UA 03/03/2018 Clear  Clear Final    pH, UA 03/03/2018 7.0  5.0 - 8.0 Final    Specific Gravity, UA 03/03/2018 1.015  1.005 - 1.030 Final    Protein, UA 03/03/2018 Negative  Negative Final    Glucose, UA 03/03/2018 Negative  Negative Final    Ketones, UA 03/03/2018 Negative  Negative Final    Bilirubin (UA) 03/03/2018 Negative  Negative Final    Occult Blood UA 03/03/2018 3+* Negative Final    Nitrite, UA 03/03/2018 Negative  Negative Final    Urobilinogen, UA  03/03/2018 Negative  <2.0 EU/dL Final    Leukocytes, UA 03/03/2018 Negative  Negative Final    POC Preg Test, Ur 03/03/2018 Negative  Negative Final     Acceptable 03/03/2018 Yes   Final    Prothrombin Time 03/03/2018 11.7  9.0 - 12.5 sec Final    INR 03/03/2018 1.1  0.8 - 1.2 Final    Troponin I 03/03/2018 <0.006  0.000 - 0.026 ng/mL Final    Lactate (Lactic Acid) 03/03/2018 2.4* 0.5 - 2.2 mmol/L Final    Group & Rh 03/03/2018 A POS   Final    Indirect Azeb 03/03/2018 NEG   Final    RBC, UA 03/03/2018 3  0 - 4 /hpf Final    WBC, UA 03/03/2018 4  0 - 5 /hpf Final    Bacteria, UA 03/03/2018 Rare  None-Occ /hpf Final    Squam Epithel, UA 03/03/2018 5  /hpf Final    Microscopic Comment 03/03/2018 SEE COMMENT   Final    Hemoglobin 03/03/2018 8.9* 12.0 - 16.0 g/dL Final    Hematocrit 03/03/2018 25.8* 37.0 - 48.5 % Final    POCT Glucose 03/03/2018 173* 70 - 110 mg/dL Final    Hemoglobin 03/03/2018 7.2* 12.0 - 16.0 g/dL Final    Hematocrit 03/03/2018 20.4* 37.0 - 48.5 % Final    WBC 03/04/2018 5.85  3.90 - 12.70 K/uL Final    RBC 03/04/2018 2.39* 4.00 - 5.40 M/uL Final    Hemoglobin 03/04/2018 7.3* 12.0 - 16.0 g/dL Final    Hematocrit 03/04/2018 20.4* 37.0 - 48.5 % Final    MCV 03/04/2018 85  82 - 98 fL Final    MCH 03/04/2018 30.5  27.0 - 31.0 pg Final    MCHC 03/04/2018 35.8  32.0 - 36.0 g/dL Final    RDW 03/04/2018 12.6  11.5 - 14.5 % Final    Platelets 03/04/2018 103* 150 - 350 K/uL Final    MPV 03/04/2018 9.7  9.2 - 12.9 fL Final    Gran # (ANC) 03/04/2018 3.1  1.8 - 7.7 K/uL Final    Lymph # 03/04/2018 2.3  1.0 - 4.8 K/uL Final    Mono # 03/04/2018 0.4  0.3 - 1.0 K/uL Final    Eos # 03/04/2018 0.1  0.0 - 0.5 K/uL Final    Baso # 03/04/2018 0.01  0.00 - 0.20 K/uL Final    Gran% 03/04/2018 53.6  38.0 - 73.0 % Final    Lymph% 03/04/2018 38.5  18.0 - 48.0 % Final    Mono% 03/04/2018 6.8  4.0 - 15.0 % Final    Eosinophil% 03/04/2018 0.9  0.0 - 8.0 % Final     Basophil% 03/04/2018 0.2  0.0 - 1.9 % Final    Differential Method 03/04/2018 Automated   Final    Sodium 03/04/2018 143  136 - 145 mmol/L Final    Potassium 03/04/2018 4.1  3.5 - 5.1 mmol/L Final    Chloride 03/04/2018 117* 95 - 110 mmol/L Final    CO2 03/04/2018 17* 23 - 29 mmol/L Final    Glucose 03/04/2018 90  70 - 110 mg/dL Final    BUN, Bld 03/04/2018 11  6 - 20 mg/dL Final    Creatinine 03/04/2018 0.7  0.5 - 1.4 mg/dL Final    Calcium 03/04/2018 7.4* 8.7 - 10.5 mg/dL Final    Anion Gap 03/04/2018 9  8 - 16 mmol/L Final    eGFR if African American 03/04/2018 >60  >60 mL/min/1.73 m^2 Final    eGFR if non African American 03/04/2018 >60  >60 mL/min/1.73 m^2 Final    Hemoglobin 03/04/2018 7.4* 12.0 - 16.0 g/dL Final    Hematocrit 03/04/2018 20.8* 37.0 - 48.5 % Final    Hemoglobin 03/04/2018 6.8* 12.0 - 16.0 g/dL Final    Hematocrit 03/04/2018 19.8* 37.0 - 48.5 % Final    UNIT NUMBER 03/03/2018 [id number]   Final    PRODUCT CODE 03/03/2018 [id number]   Final    DISPENSE STATUS 03/03/2018 TRANSFUSED   Final    CODING SYSTEM 03/03/2018 [id number]   Final    Unit Blood Type Code 03/03/2018 6200   Final    Unit Blood Type 03/03/2018 A POS   Final    Unit Expiration 03/03/2018 [id number]   Final    UNIT NUMBER 03/03/2018 [id number]   Final    PRODUCT CODE 03/03/2018 [id number]   Final    DISPENSE STATUS 03/03/2018 TRANSFUSED   Final    CODING SYSTEM 03/03/2018 [id number]   Final    Unit Blood Type Code 03/03/2018 6200   Final    Unit Blood Type 03/03/2018 A POS   Final    Unit Expiration 03/03/2018 [id number]   Final    POCT Glucose 03/05/2018 96  70 - 110 mg/dL Final    WBC 03/05/2018 7.91  3.90 - 12.70 K/uL Final    RBC 03/05/2018 3.38* 4.00 - 5.40 M/uL Final    Hemoglobin 03/05/2018 10.1* 12.0 - 16.0 g/dL Final    Hematocrit 03/05/2018 30.1* 37.0 - 48.5 % Final    MCV 03/05/2018 89  82 - 98 fL Final    MCH 03/05/2018 29.9  27.0 - 31.0 pg Final    MCHC 03/05/2018 33.6  32.0 -  36.0 g/dL Final    RDW 03/05/2018 13.0  11.5 - 14.5 % Final    Platelets 03/05/2018 222  150 - 350 K/uL Final    MPV 03/05/2018 8.9* 9.2 - 12.9 fL Final    Gran # (ANC) 03/05/2018 5.8  1.8 - 7.7 K/uL Final    Lymph # 03/05/2018 1.5  1.0 - 4.8 K/uL Final    Mono # 03/05/2018 0.5  0.3 - 1.0 K/uL Final    Eos # 03/05/2018 0.1  0.0 - 0.5 K/uL Final    Baso # 03/05/2018 0.01  0.00 - 0.20 K/uL Final    Gran% 03/05/2018 73.9* 38.0 - 73.0 % Final    Lymph% 03/05/2018 19.1  18.0 - 48.0 % Final    Mono% 03/05/2018 5.7  4.0 - 15.0 % Final    Eosinophil% 03/05/2018 1.1  0.0 - 8.0 % Final    Basophil% 03/05/2018 0.1  0.0 - 1.9 % Final    Differential Method 03/05/2018 Automated   Final    Hemoglobin A1C 03/05/2018 5.0  4.0 - 5.6 % Final    Estimated Avg Glucose 03/05/2018 97  68 - 131 mg/dL Final    POCT Glucose 03/06/2018 96  70 - 110 mg/dL Final    WBC 03/06/2018 5.97  3.90 - 12.70 K/uL Final    RBC 03/06/2018 3.44* 4.00 - 5.40 M/uL Final    Hemoglobin 03/06/2018 10.3* 12.0 - 16.0 g/dL Final    Hematocrit 03/06/2018 30.5* 37.0 - 48.5 % Final    MCV 03/06/2018 89  82 - 98 fL Final    MCH 03/06/2018 29.9  27.0 - 31.0 pg Final    MCHC 03/06/2018 33.8  32.0 - 36.0 g/dL Final    RDW 03/06/2018 13.3  11.5 - 14.5 % Final    Platelets 03/06/2018 238  150 - 350 K/uL Final    MPV 03/06/2018 9.1* 9.2 - 12.9 fL Final    Gran # (ANC) 03/06/2018 3.6  1.8 - 7.7 K/uL Final    Lymph # 03/06/2018 1.6  1.0 - 4.8 K/uL Final    Mono # 03/06/2018 0.5  0.3 - 1.0 K/uL Final    Eos # 03/06/2018 0.2  0.0 - 0.5 K/uL Final    Baso # 03/06/2018 0.01  0.00 - 0.20 K/uL Final    Gran% 03/06/2018 60.6  38.0 - 73.0 % Final    Lymph% 03/06/2018 27.3  18.0 - 48.0 % Final    Mono% 03/06/2018 8.5  4.0 - 15.0 % Final    Eosinophil% 03/06/2018 3.2  0.0 - 8.0 % Final    Basophil% 03/06/2018 0.2  0.0 - 1.9 % Final    Differential Method 03/06/2018 Automated   Final    POCT Glucose 03/06/2018 84  70 - 110 mg/dL Final   Office  Visit on 01/09/2018   Component Date Value Ref Range Status    Rapid Influenza A Ag 01/09/2018 Positive* Negative Final    Rapid Influenza B Ag 01/09/2018 Negative  Negative Final     Acceptable 01/09/2018 Yes   Final   ]    Assessment:    1. Acute gastric ulcer with hemorrhage    2. Gastroesophageal reflux disease without esophagitis          Jody was seen today for follow-up.    Diagnoses and all orders for this visit:    Acute gastric ulcer with hemorrhage  -     CBC auto differential; Future  - Unstable. Concern that patient's bleeding has not resolved. Current vital signs are normal and stable.  - Obtain CBC.  If further decrease in H/H will instruct to return to ED for emergency endoscopy.    Gastroesophageal reflux disease without esophagitis   -Stable. Continue food/trigger avoidance.  Continue PPI.        FOLLOW UP: Follow-up in about 1 week (around 4/4/2018) for Follow up.

## 2018-03-29 ENCOUNTER — TELEPHONE (OUTPATIENT)
Dept: FAMILY MEDICINE | Facility: CLINIC | Age: 43
End: 2018-03-29

## 2018-05-08 ENCOUNTER — TELEPHONE (OUTPATIENT)
Dept: FAMILY MEDICINE | Facility: CLINIC | Age: 43
End: 2018-05-08

## 2018-06-22 DIAGNOSIS — Z12.39 BREAST CANCER SCREENING: ICD-10-CM

## 2019-01-03 ENCOUNTER — NURSE TRIAGE (OUTPATIENT)
Dept: ADMINISTRATIVE | Facility: CLINIC | Age: 44
End: 2019-01-03

## 2019-01-03 ENCOUNTER — OFFICE VISIT (OUTPATIENT)
Dept: FAMILY MEDICINE | Facility: CLINIC | Age: 44
End: 2019-01-03
Payer: MEDICARE

## 2019-01-03 VITALS
RESPIRATION RATE: 16 BRPM | HEIGHT: 62 IN | BODY MASS INDEX: 33.92 KG/M2 | TEMPERATURE: 98 F | HEART RATE: 93 BPM | OXYGEN SATURATION: 98 % | WEIGHT: 184.31 LBS | SYSTOLIC BLOOD PRESSURE: 112 MMHG | DIASTOLIC BLOOD PRESSURE: 75 MMHG

## 2019-01-03 DIAGNOSIS — J32.9 SINUSITIS, UNSPECIFIED CHRONICITY, UNSPECIFIED LOCATION: ICD-10-CM

## 2019-01-03 DIAGNOSIS — H81.10 BENIGN PAROXYSMAL POSITIONAL VERTIGO, UNSPECIFIED LATERALITY: Primary | ICD-10-CM

## 2019-01-03 PROCEDURE — 99999 PR PBB SHADOW E&M-EST. PATIENT-LVL IV: CPT | Mod: PBBFAC,,, | Performed by: FAMILY MEDICINE

## 2019-01-03 PROCEDURE — 99214 OFFICE O/P EST MOD 30 MIN: CPT | Mod: S$GLB,,, | Performed by: FAMILY MEDICINE

## 2019-01-03 PROCEDURE — 3008F PR BODY MASS INDEX (BMI) DOCUMENTED: ICD-10-PCS | Mod: CPTII,S$GLB,, | Performed by: FAMILY MEDICINE

## 2019-01-03 PROCEDURE — 99999 PR PBB SHADOW E&M-EST. PATIENT-LVL IV: ICD-10-PCS | Mod: PBBFAC,,, | Performed by: FAMILY MEDICINE

## 2019-01-03 PROCEDURE — 3008F BODY MASS INDEX DOCD: CPT | Mod: CPTII,S$GLB,, | Performed by: FAMILY MEDICINE

## 2019-01-03 PROCEDURE — 99214 PR OFFICE/OUTPT VISIT, EST, LEVL IV, 30-39 MIN: ICD-10-PCS | Mod: S$GLB,,, | Performed by: FAMILY MEDICINE

## 2019-01-03 RX ORDER — METHYLPREDNISOLONE 4 MG/1
TABLET ORAL
Qty: 1 PACKAGE | Refills: 0 | Status: SHIPPED | OUTPATIENT
Start: 2019-01-03 | End: 2019-06-07

## 2019-01-03 RX ORDER — FLUTICASONE PROPIONATE 50 MCG
SPRAY, SUSPENSION (ML) NASAL
Qty: 48 ML | Refills: 0 | OUTPATIENT
Start: 2019-01-03

## 2019-01-03 RX ORDER — FLUTICASONE PROPIONATE 50 MCG
2 SPRAY, SUSPENSION (ML) NASAL DAILY
Qty: 16 G | Refills: 0 | Status: SHIPPED | OUTPATIENT
Start: 2019-01-03 | End: 2019-01-13

## 2019-01-03 NOTE — TELEPHONE ENCOUNTER
Reason for Disposition   SEVERE dizziness (e.g., unable to stand, requires support to walk, feels like passing out now)    Protocols used: ST DIZZINESS-A-[place]    Spoke to patient's  Mr. Loza. He states Mrs. Loza is experiencing similar symptoms that resulted in her being hospitalized last year. Patient is vomiting and requiring support to walk around the house. Advised caller to bring Mrs. Loza to the ED. He is requesting approval for an appointment the office today, preferably after 3 pm.

## 2019-01-03 NOTE — TELEPHONE ENCOUNTER
Spoke with patient informed of recommendation and information. Pt states she is feeling slightly better. Pt states she will go to ER for further evaluation due to reoccurrences over the past year.

## 2019-01-03 NOTE — TELEPHONE ENCOUNTER
Please contact patient's caregiver and inform that I recommend that the patient be seen in the ER and not in the office.  The patient will need blood work and possible emergency head imaging.  This should be done in an urgent setting.

## 2019-01-03 NOTE — PROGRESS NOTES
Chief Complaint   Patient presents with    Nausea    Dizziness    Otalgia     both ears       HPI    Jody Loza is 43 y.o. female. The primary encounter diagnosis was Benign paroxysmal positional vertigo, unspecified laterality. A diagnosis of Sinusitis, unspecified chronicity, unspecified location was also pertinent to this visit.    43 year old female comes to clinic with complaint of dizziness and mild/moderate nausea.  Patient admits that these symptoms have been ongoing for 2-3 months but episodes are intermittent.    She comes to clinic today due to acutely worsening dizziness.  She denies vomiting at any time.  Patient notes since onset she has had mild right ear pain, increased stress, and general fatigue/flu-like symptoms.  She reports some mild sinus and nasal congestion and headaches.  She also admits to postnasal drip, sore throat, and tinnitus today.    Patient has not used any OTC medications for her symptoms.      Review of Systems   Constitutional: Positive for fatigue. Negative for chills, diaphoresis and fever.   HENT: Positive for congestion, ear pain, postnasal drip, sinus pressure, sore throat and tinnitus. Negative for ear discharge, rhinorrhea, sinus pain and trouble swallowing.    Eyes: Negative for discharge, redness and itching.   Respiratory: Positive for cough. Negative for chest tightness, shortness of breath and wheezing.    Gastrointestinal: Positive for nausea. Negative for diarrhea and vomiting.   Musculoskeletal: Negative for arthralgias and myalgias.   Skin: Negative for rash.           Current Outpatient Medications:     ferrous sulfate 325 mg (65 mg iron) Tab tablet, Take 1 tablet (325 mg total) by mouth daily with breakfast., Disp: 90 tablet, Rfl: 3    fluticasone (FLONASE) 50 mcg/actuation nasal spray, 2 sprays (100 mcg total) by Each Nare route once daily. for 10 days, Disp: 16 g, Rfl: 0    methylPREDNISolone (MEDROL DOSEPACK) 4 mg tablet, use as directed, Disp: 1  "Package, Rfl: 0    pantoprazole (PROTONIX) 40 MG tablet, Take 1 tablet (40 mg total) by mouth 2 (two) times daily., Disp: 180 tablet, Rfl: 1      Blood pressure 112/75, pulse 93, temperature 98 °F (36.7 °C), temperature source Oral, resp. rate 16, height 5' 2" (1.575 m), weight 83.6 kg (184 lb 4.9 oz), SpO2 98 %.    Physical Exam   Constitutional: Vital signs are normal. She appears well-developed and well-nourished. She does not appear ill. No distress.   HENT:   Head: Head is without right periorbital erythema and without left periorbital erythema.   Right Ear: No tenderness. Tympanic membrane is not bulging. No middle ear effusion.   Left Ear: No tenderness. Tympanic membrane is not bulging.  No middle ear effusion.   Nose: Nose normal.   Mouth/Throat: Oropharyngeal exudate and posterior oropharyngeal erythema present. No tonsillar exudate.   Right canal initially obscured by cerumen. S/p ear wash   Eyes: Right eye exhibits nystagmus. Left eye exhibits nystagmus.   Baseline nystagmus   Cardiovascular: Normal heart sounds.   No murmur heard.  Pulmonary/Chest: Effort normal and breath sounds normal.   Psychiatric: She has a normal mood and affect. Her behavior is normal.       No visits with results within 3 Month(s) from this visit.   Latest known visit with results is:   Lab Visit on 03/28/2018   Component Date Value Ref Range Status    WBC 03/28/2018 7.63  3.90 - 12.70 K/uL Final    RBC 03/28/2018 4.12  4.00 - 5.40 M/uL Final    Hemoglobin 03/28/2018 12.5  12.0 - 16.0 g/dL Final    Hematocrit 03/28/2018 36.5* 37.0 - 48.5 % Final    MCV 03/28/2018 89  82 - 98 fL Final    MCH 03/28/2018 30.3  27.0 - 31.0 pg Final    MCHC 03/28/2018 34.2  32.0 - 36.0 g/dL Final    RDW 03/28/2018 13.2  11.5 - 14.5 % Final    Platelets 03/28/2018 236  150 - 350 K/uL Final    MPV 03/28/2018 8.8* 9.2 - 12.9 fL Final    Gran # (ANC) 03/28/2018 5.1  1.8 - 7.7 K/uL Final    Lymph # 03/28/2018 1.9  1.0 - 4.8 K/uL Final    " Mono # 03/28/2018 0.6  0.3 - 1.0 K/uL Final    Eos # 03/28/2018 0.1  0.0 - 0.5 K/uL Final    Baso # 03/28/2018 0.01  0.00 - 0.20 K/uL Final    Gran% 03/28/2018 66.2  38.0 - 73.0 % Final    Lymph% 03/28/2018 24.9  18.0 - 48.0 % Final    Mono% 03/28/2018 7.5  4.0 - 15.0 % Final    Eosinophil% 03/28/2018 1.3  0.0 - 8.0 % Final    Basophil% 03/28/2018 0.1  0.0 - 1.9 % Final    Differential Method 03/28/2018 Automated   Final   ]    Assessment:    1. Benign paroxysmal positional vertigo, unspecified laterality    2. Sinusitis, unspecified chronicity, unspecified location          Jody was seen today for nausea, dizziness and otalgia.    Diagnoses and all orders for this visit:    Benign paroxysmal positional vertigo, unspecified laterality  -     methylPREDNISolone (MEDROL DOSEPACK) 4 mg tablet; use as directed  - New problem. Likely due to sinusitis. See plan below.  Some improvement noted with ear wash.    Sinusitis, unspecified chronicity, unspecified location  -     fluticasone (FLONASE) 50 mcg/actuation nasal spray; 2 sprays (100 mcg total) by Each Nare route once daily. for 10 days  -     methylPREDNISolone (MEDROL DOSEPACK) 4 mg tablet; use as directed  - New problem. Nasal steroid and OTC Mucinex recommended.  If no improvement in 24 hours please contact clinic and consider antibiotic therapy.          FOLLOW UP: Follow-up in about 1 week (around 1/10/2019), or if symptoms worsen or fail to improve.

## 2019-01-03 NOTE — PATIENT INSTRUCTIONS
Understanding Your Sinuses  Your sinuses are air-filled spaces between the bones in your head. They have small openings that connect to the nasal cavity. The sinuses make mucus that drains into the nose. This helps keep the nose moist and free of dust and germs.      Parts of the nasal cavity  · The septum is the wall of cartilage and bone in the center of the nasal cavity.  · The middle meatus is the intersection between the sinuses.  · Turbinates are ridges on the sides of the nasal cavity.  Cilia keep sinuses clear    Air circulates freely though healthy sinuses. Tiny, hairlike structures called cilia line the sinuses. Cilia move the thin, watery mucus through the sinuses and into the nose. Sinuses are healthy when they drain freely. Sinus drainage can be blocked if the sinus lining is swollen or if mucus is too thick. Cilia that are damaged or dont work correctly can also lead to problems with drainage.  Date Last Reviewed: 10/1/2016  © 6559-6713 [employer]. [address] [place] [address], [place] [address]. All rights reserved. This information is not intended as a substitute for professional medical care. Always follow your healthcare professional's instructions.

## 2019-02-12 ENCOUNTER — OFFICE VISIT (OUTPATIENT)
Dept: FAMILY MEDICINE | Facility: CLINIC | Age: 44
End: 2019-02-12
Payer: MEDICARE

## 2019-02-12 VITALS
OXYGEN SATURATION: 100 % | HEIGHT: 62 IN | DIASTOLIC BLOOD PRESSURE: 74 MMHG | WEIGHT: 184.94 LBS | HEART RATE: 66 BPM | TEMPERATURE: 98 F | BODY MASS INDEX: 34.03 KG/M2 | SYSTOLIC BLOOD PRESSURE: 114 MMHG | RESPIRATION RATE: 20 BRPM

## 2019-02-12 DIAGNOSIS — K25.7 CHRONIC GASTRIC ULCER, UNSPECIFIED WHETHER GASTRIC ULCER HEMORRHAGE OR PERFORATION PRESENT: ICD-10-CM

## 2019-02-12 DIAGNOSIS — K25.7 CHRONIC GASTRIC ULCER, UNSPECIFIED WHETHER GASTRIC ULCER HEMORRHAGE OR PERFORATION PRESENT: Primary | ICD-10-CM

## 2019-02-12 PROCEDURE — 99999 PR PBB SHADOW E&M-EST. PATIENT-LVL IV: ICD-10-PCS | Mod: PBBFAC,,, | Performed by: FAMILY MEDICINE

## 2019-02-12 PROCEDURE — 3008F PR BODY MASS INDEX (BMI) DOCUMENTED: ICD-10-PCS | Mod: CPTII,S$GLB,, | Performed by: FAMILY MEDICINE

## 2019-02-12 PROCEDURE — 99214 PR OFFICE/OUTPT VISIT, EST, LEVL IV, 30-39 MIN: ICD-10-PCS | Mod: S$GLB,,, | Performed by: FAMILY MEDICINE

## 2019-02-12 PROCEDURE — 3008F BODY MASS INDEX DOCD: CPT | Mod: CPTII,S$GLB,, | Performed by: FAMILY MEDICINE

## 2019-02-12 PROCEDURE — 99999 PR PBB SHADOW E&M-EST. PATIENT-LVL IV: CPT | Mod: PBBFAC,,, | Performed by: FAMILY MEDICINE

## 2019-02-12 PROCEDURE — 99214 OFFICE O/P EST MOD 30 MIN: CPT | Mod: S$GLB,,, | Performed by: FAMILY MEDICINE

## 2019-02-12 RX ORDER — PANTOPRAZOLE SODIUM 40 MG/1
40 TABLET, DELAYED RELEASE ORAL DAILY
Qty: 30 TABLET | Refills: 1 | Status: SHIPPED | OUTPATIENT
Start: 2019-02-12 | End: 2019-10-01 | Stop reason: SDUPTHER

## 2019-02-12 RX ORDER — PANTOPRAZOLE SODIUM 40 MG/1
TABLET, DELAYED RELEASE ORAL
Qty: 90 TABLET | Refills: 1 | OUTPATIENT
Start: 2019-02-12

## 2019-02-12 NOTE — PATIENT INSTRUCTIONS
Appt made at [facility]  [address] [place] [address], [id number]  [place] [id number]    Thursday 2/14/19 at 11am arrive by 10:30am

## 2019-02-12 NOTE — PROGRESS NOTES
Routine Office Visit    Patient Name: Jody Loza    : 1975  MRN: [id number]    Subjective:  Jody is a 43 y.o. female who presents today for:   Chief Complaint   Patient presents with    Abdominal Pain     worse in last two weeks       43-year-old female comes in for evaluation of abdominal pain.  She reports that the pain feels like a burning sensation.  The she reports that she has a history of a ulcers in her stomach diagnosed by endoscopy last year.  She was placed on pantoprazole twice a day by her gastroenterologist.  She had decrease this to once a day because she was getting dizzy with twice a day.  She stop taking completely several months ago because she read a lot of information on how proton pump inhibitors a interfere with vitamin absorption.  She states that the current episodes of stomach pain started several weeks ago.  She reports some bloating.  She states that the pain radiates across the abdomen.  She reports occasional episodes of back pain. She reports no blood in her stools.  She reports no coughing up blood.      Past Medical History  Past Medical History:   Diagnosis Date    H/O albinism     Vision impairment     blindness/albinism        Past Surgical History  Past Surgical History:   Procedure Laterality Date    benign ovarian cyst      ESOPHAGOGASTRODUODENOSCOPY (EGD) N/A 3/5/2018    Performed [facility]inder [facility]ls [facility]    TONSILLECTOMY          Family History  Family History   Problem Relation Age of Onset    Fibrocystic breast disease Mother     Hyperlipidemia Mother     Hyperlipidemia Father     Polycythemia Maternal Aunt     Heart disease Maternal Grandfather     Hypertension Maternal Grandfather     Heart disease Paternal Grandfather     Hypertension Paternal Grandfather        Social History  Social History     Socioeconomic History    Marital status:      Spouse name: Not on file    Number of children: Not on file    Years of education: Not  "on file    Highest education level: Not on file   Social Needs    Financial resource strain: Not on file    Food insecurity - worry: Not on file    Food insecurity - inability: Not on file    Transportation needs - medical: Not on file    Transportation needs - non-medical: Not on file   Occupational History    Not on file   Tobacco Use    Smoking status: Never Smoker    Smokeless tobacco: Never Used   Substance and Sexual Activity    Alcohol use: Yes     Comment: Socially, 6 beers weekly    Drug use: No    Sexual activity: Not on file   Other Topics Concern    Not on file   Social History Narrative    Not on file       Current Medications  Current Outpatient Medications on File Prior to Visit   Medication Sig Dispense Refill    ferrous sulfate 325 mg (65 mg iron) Tab tablet Take 1 tablet (325 mg total) by mouth daily with breakfast. 90 tablet 3    methylPREDNISolone (MEDROL DOSEPACK) 4 mg tablet use as directed 1 Package 0    [DISCONTINUED] pantoprazole (PROTONIX) 40 MG tablet Take 1 tablet (40 mg total) by mouth 2 (two) times daily. 180 tablet 1     No current facility-administered medications on file prior to visit.        Allergies   Review of patient's allergies indicates:  No Known Allergies    Review of Systems   Constitutional: Negative for chills and fever.   Respiratory: Negative for shortness of breath and wheezing.    Cardiovascular: Negative for chest pain and palpitations.   Gastrointestinal: Positive for abdominal pain. Negative for blood in stool, constipation, diarrhea, nausea and vomiting.   Genitourinary: Negative for dysuria.     /74 (BP Location: Left arm, Patient Position: Sitting, BP Method: Medium (Automatic))   Pulse 66   Temp 98.1 °F (36.7 °C) (Oral)   Resp 20   Ht 5' 2" (1.575 m)   Wt 83.9 kg (184 lb 15.5 oz)   LMP 01/20/2019   SpO2 100%   BMI 33.83 kg/m²     Physical Exam   Constitutional: She appears well-developed and well-nourished.   HENT:   Head: " Normocephalic and atraumatic.   Right Ear: External ear normal.   Left Ear: External ear normal.   Nose: Nose normal.   Mouth/Throat: Oropharynx is clear and moist. No oropharyngeal exudate.   Eyes: Conjunctivae and EOM are normal. Pupils are equal, round, and reactive to light. Right eye exhibits no discharge. Left eye exhibits no discharge.   Neck: Normal range of motion. Neck supple. No tracheal deviation present.   Cardiovascular: Normal rate, regular rhythm, normal heart sounds and intact distal pulses.   No murmur heard.  Pulmonary/Chest: Effort normal and breath sounds normal. She has no wheezes. She has no rales.   Abdominal: Soft. Bowel sounds are normal. She exhibits no mass. There is no tenderness.   Lymphadenopathy:     She has no cervical adenopathy.   Psychiatric: She has a normal mood and affect.   Vitals reviewed.      Assessment/Plan:  Jody was seen today for abdominal pain.    Diagnoses and all orders for this visit:    Chronic gastric ulcer, unspecified whether gastric ulcer hemorrhage or perforation present  -     pantoprazole (PROTONIX) 40 MG tablet; Take 1 tablet (40 mg total) by mouth once daily.  -     Ambulatory referral to Gastroenterology     The patient is endoscopy report from last year was reviewed.  Given gastric ulcers, patient was informed to restart her pantoprazole.  Also encouraged appointment with gastroenterology to which she agreed.  I made her an appointment with her gastroenterologist for this Thursday.  Patient strongly encouraged to follow up with a gastroenterologist.  Dangers of ulcer perforation were explained.        This office note has been dictated.  This dictation has been generated using M-Modal Fluency Direct dictation; some phonetic errors may occur.

## 2019-02-19 ENCOUNTER — TELEPHONE (OUTPATIENT)
Dept: ADMINISTRATIVE | Facility: HOSPITAL | Age: 44
End: 2019-02-19

## 2019-06-05 ENCOUNTER — OFFICE VISIT (OUTPATIENT)
Dept: FAMILY MEDICINE | Facility: CLINIC | Age: 44
End: 2019-06-05
Payer: MEDICARE

## 2019-06-05 VITALS
WEIGHT: 184.94 LBS | BODY MASS INDEX: 34.03 KG/M2 | SYSTOLIC BLOOD PRESSURE: 120 MMHG | HEART RATE: 103 BPM | DIASTOLIC BLOOD PRESSURE: 62 MMHG | HEIGHT: 62 IN | TEMPERATURE: 98 F

## 2019-06-05 DIAGNOSIS — J06.9 VIRAL URI: ICD-10-CM

## 2019-06-05 DIAGNOSIS — J06.9 VIRAL URI: Primary | ICD-10-CM

## 2019-06-05 PROCEDURE — 96372 PR INJECTION,THERAP/PROPH/DIAG2ST, IM OR SUBCUT: ICD-10-PCS | Mod: S$GLB,,, | Performed by: FAMILY MEDICINE

## 2019-06-05 PROCEDURE — 99214 OFFICE O/P EST MOD 30 MIN: CPT | Mod: 25,S$GLB,, | Performed by: FAMILY MEDICINE

## 2019-06-05 PROCEDURE — 99999 PR PBB SHADOW E&M-EST. PATIENT-LVL III: CPT | Mod: PBBFAC,,, | Performed by: FAMILY MEDICINE

## 2019-06-05 PROCEDURE — 3008F PR BODY MASS INDEX (BMI) DOCUMENTED: ICD-10-PCS | Mod: CPTII,S$GLB,, | Performed by: FAMILY MEDICINE

## 2019-06-05 PROCEDURE — 99999 PR PBB SHADOW E&M-EST. PATIENT-LVL III: ICD-10-PCS | Mod: PBBFAC,,, | Performed by: FAMILY MEDICINE

## 2019-06-05 PROCEDURE — 3008F BODY MASS INDEX DOCD: CPT | Mod: CPTII,S$GLB,, | Performed by: FAMILY MEDICINE

## 2019-06-05 PROCEDURE — 99214 PR OFFICE/OUTPT VISIT, EST, LEVL IV, 30-39 MIN: ICD-10-PCS | Mod: 25,S$GLB,, | Performed by: FAMILY MEDICINE

## 2019-06-05 PROCEDURE — 96372 THER/PROPH/DIAG INJ SC/IM: CPT | Mod: S$GLB,,, | Performed by: FAMILY MEDICINE

## 2019-06-05 RX ORDER — ONDANSETRON 8 MG/1
8 TABLET, ORALLY DISINTEGRATING ORAL EVERY 8 HOURS PRN
Qty: 30 TABLET | Refills: 0 | Status: SHIPPED | OUTPATIENT
Start: 2019-06-05 | End: 2021-02-22

## 2019-06-05 RX ORDER — IBUPROFEN 600 MG/1
600 TABLET ORAL EVERY 6 HOURS PRN
Qty: 30 TABLET | Refills: 0 | Status: SHIPPED | OUTPATIENT
Start: 2019-06-05 | End: 2019-10-02

## 2019-06-05 RX ORDER — IBUPROFEN 600 MG/1
TABLET ORAL
Qty: 385 TABLET | Refills: 0 | OUTPATIENT
Start: 2019-06-05

## 2019-06-05 RX ORDER — KETOROLAC TROMETHAMINE 30 MG/ML
30 INJECTION, SOLUTION INTRAMUSCULAR; INTRAVENOUS
Status: COMPLETED | OUTPATIENT
Start: 2019-06-05 | End: 2019-06-05

## 2019-06-05 RX ADMIN — KETOROLAC TROMETHAMINE 30 MG: 30 INJECTION, SOLUTION INTRAMUSCULAR; INTRAVENOUS at 10:06

## 2019-06-07 ENCOUNTER — OFFICE VISIT (OUTPATIENT)
Dept: FAMILY MEDICINE | Facility: CLINIC | Age: 44
End: 2019-06-07
Payer: MEDICARE

## 2019-06-07 VITALS
HEART RATE: 96 BPM | OXYGEN SATURATION: 96 % | BODY MASS INDEX: 33.95 KG/M2 | DIASTOLIC BLOOD PRESSURE: 73 MMHG | WEIGHT: 184.5 LBS | TEMPERATURE: 98 F | SYSTOLIC BLOOD PRESSURE: 110 MMHG | HEIGHT: 62 IN

## 2019-06-07 DIAGNOSIS — J02.9 SORE THROAT: ICD-10-CM

## 2019-06-07 DIAGNOSIS — J06.9 VIRAL URI: ICD-10-CM

## 2019-06-07 DIAGNOSIS — J06.9 VIRAL URI: Primary | ICD-10-CM

## 2019-06-07 LAB
CTP QC/QA: YES
S PYO RRNA THROAT QL PROBE: NEGATIVE

## 2019-06-07 PROCEDURE — 99999 PR PBB SHADOW E&M-EST. PATIENT-LVL III: ICD-10-PCS | Mod: PBBFAC,,, | Performed by: FAMILY MEDICINE

## 2019-06-07 PROCEDURE — 87880 POCT RAPID STREP A: ICD-10-PCS | Mod: QW,S$GLB,, | Performed by: FAMILY MEDICINE

## 2019-06-07 PROCEDURE — 99999 PR PBB SHADOW E&M-EST. PATIENT-LVL III: CPT | Mod: PBBFAC,,, | Performed by: FAMILY MEDICINE

## 2019-06-07 PROCEDURE — 3008F BODY MASS INDEX DOCD: CPT | Mod: CPTII,S$GLB,, | Performed by: FAMILY MEDICINE

## 2019-06-07 PROCEDURE — 99214 PR OFFICE/OUTPT VISIT, EST, LEVL IV, 30-39 MIN: ICD-10-PCS | Mod: S$GLB,,, | Performed by: FAMILY MEDICINE

## 2019-06-07 PROCEDURE — 99214 OFFICE O/P EST MOD 30 MIN: CPT | Mod: S$GLB,,, | Performed by: FAMILY MEDICINE

## 2019-06-07 PROCEDURE — 87880 STREP A ASSAY W/OPTIC: CPT | Mod: QW,S$GLB,, | Performed by: FAMILY MEDICINE

## 2019-06-07 PROCEDURE — 3008F PR BODY MASS INDEX (BMI) DOCUMENTED: ICD-10-PCS | Mod: CPTII,S$GLB,, | Performed by: FAMILY MEDICINE

## 2019-06-07 RX ORDER — METHYLPREDNISOLONE 4 MG/1
TABLET ORAL
Qty: 1 PACKAGE | Refills: 0 | Status: SHIPPED | OUTPATIENT
Start: 2019-06-07 | End: 2021-02-22

## 2019-06-07 RX ORDER — MONTELUKAST SODIUM 10 MG/1
10 TABLET ORAL NIGHTLY
Qty: 30 TABLET | Refills: 0 | Status: SHIPPED | OUTPATIENT
Start: 2019-06-07 | End: 2019-06-07 | Stop reason: SDUPTHER

## 2019-06-07 RX ORDER — MONTELUKAST SODIUM 10 MG/1
10 TABLET ORAL NIGHTLY
Qty: 90 TABLET | Refills: 0 | Status: SHIPPED | OUTPATIENT
Start: 2019-06-07 | End: 2019-09-05

## 2019-06-07 NOTE — PROGRESS NOTES
Routine Office Visit    Patient Name: Jody Loza    : 1975  MRN: [id number]    Subjective:  Jody is a 43 y.o. female who presents today for:    1. Sore throat  Patient presenting today with sore throat x 5 days.  She was seen 2 days ago and was dx with viral URI.  There have been no fevers or chills, but has had myalgias. She did receive a steroid shot 2 days ago.  She denies post nasal drip, but then states the pain is on one side.    Past Medical History  Past Medical History:   Diagnosis Date    H/O albinism     Vision impairment     blindness/albinism        Past Surgical History  Past Surgical History:   Procedure Laterality Date    benign ovarian cyst      ESOPHAGOGASTRODUODENOSCOPY (EGD) N/A 3/5/2018    [facility] ENDO    TONSILLECTOMY         Family History  Family History   Problem Relation Age of Onset    Fibrocystic breast disease Mother     Hyperlipidemia Mother     Hyperlipidemia Father     Polycythemia Maternal Aunt     Heart disease Maternal Grandfather     Hypertension Maternal Grandfather     Heart disease Paternal Grandfather     Hypertension Paternal Grandfather        Social History  Social History     Socioeconomic History    Marital status:      Spouse name: Not on file    Number of children: Not on file    Years of education: Not on file    Highest education level: Not on file   Occupational History    Not on file   Social Needs    Financial resource strain: Not on file    Food insecurity:     Worry: Not on file     Inability: Not on file    Transportation needs:     Medical: Not on file     Non-medical: Not on file   Tobacco Use    Smoking status: Never Smoker    Smokeless tobacco: Never Used   Substance and Sexual Activity    Alcohol use: Yes     Comment: Socially, 6 beers weekly    Drug use: No    Sexual activity: Not on file   Lifestyle    Physical activity:     Days per week: Not on file     Minutes per session: Not  "on file    Stress: Not on file   Relationships    Social connections:     Talks on phone: Not on file     Gets together: Not on file     Attends Restorationist service: Not on file     Active member of club or organization: Not on file     Attends meetings of clubs or organizations: Not on file     Relationship status: Not on file   Other Topics Concern    Not on file   Social History Narrative    Not on file       Current Medications  Current Outpatient Medications on File Prior to Visit   Medication Sig Dispense Refill    ibuprofen (ADVIL,MOTRIN) 600 MG tablet Take 1 tablet (600 mg total) by mouth every 6 (six) hours as needed for Pain. Take with a meal 30 tablet 0    ondansetron (ZOFRAN-ODT) 8 MG TbDL Take 1 tablet (8 mg total) by mouth every 8 (eight) hours as needed. 30 tablet 0    pantoprazole (PROTONIX) 40 MG tablet Take 1 tablet (40 mg total) by mouth once daily. 30 tablet 1    [DISCONTINUED] methylPREDNISolone (MEDROL DOSEPACK) 4 mg tablet use as directed 1 Package 0    ferrous sulfate 325 mg (65 mg iron) Tab tablet Take 1 tablet (325 mg total) by mouth daily with breakfast. 90 tablet 3     No current facility-administered medications on file prior to visit.        Allergies   Review of patient's allergies indicates:  No Known Allergies    Review of Systems (Pertinent positives)  Review of Systems   Constitutional: Negative.    HENT: Positive for sore throat.    Eyes: Negative.    Respiratory: Negative.    Cardiovascular: Negative.    Gastrointestinal: Negative.    Skin: Negative.          /73 (BP Location: Left arm, Patient Position: Sitting, BP Method: Medium (Automatic))   Pulse 96   Temp 98.3 °F (36.8 °C) (Oral)   Ht 5' 2" (1.575 m)   Wt 83.7 kg (184 lb 8.4 oz)   LMP 05/27/2019 (Approximate)   SpO2 96%   BMI 33.75 kg/m²     GENERAL APPEARANCE: in no apparent distress and well developed and well nourished  HEENT: PERRLA, EOMI, Sclera clear, anicteric, Oropharynx clear, no lesions, Neck " supple with midline trachea  NECK: normal, supple, no adenopathy, thyroid normal in size  RESPIRATORY: appears well, vitals normal, no respiratory distress, acyanotic, normal RR, chest clear, no wheezing, crepitations, rhonchi, normal symmetric air entry  HEART: regular rate and rhythm, S1, S2 normal, no murmur, click, rub or gallop.    ABDOMEN: abdomen is soft without tenderness, no masses, no hernias, no organomegaly, no rebound, no guarding. Suprapubic tenderness absent. No CVA tenderness.  SKIN: no rashes, no wounds, no other lesions  PSYCH: Alert, oriented x 3, thought content appropriate, speech normal, pleasant and cooperative, good eye contact, well groomed    Assessment/Plan:  Jody Loza is a 43 y.o. female who presents today for :    Jody was seen today for sore throat, otalgia and cough.    Diagnoses and all orders for this visit:    Viral URI  -     montelukast (SINGULAIR) 10 mg tablet; Take 1 tablet (10 mg total) by mouth every evening.  -     methylPREDNISolone (MEDROL DOSEPACK) 4 mg tablet; use as directed    Sore throat  -     POCT Rapid Strep A      1.  Rapid strep negative  2.  Singulair for what appears to be post nasal drip  3.  Steroid inflammation  4.  childrens ibuprofen suspension 200mg every 4 hours prn for pain      Camilo Weber MD

## 2019-06-07 NOTE — PROGRESS NOTES
Routine Office Visit    Patient Name: Jody Loza    : 1975  MRN: [id number]    Subjective:  Jody is a 43 y.o. female who presents today for:   Chief Complaint   Patient presents with    Influenza     43-year-old female comes in for evaluation of what she calls flu symptoms.  She reports that she has no fevers, but has some chills.  She also reports sore throat, headache, nausea, dizziness, nasal congestion, and postnasal drip.  She has body aches but she also reports chronic body aches.  She states that because of nausea, she is having a hard time drinking fluids and eating solids.    Past Medical History  Past Medical History:   Diagnosis Date    H/O albinism     Vision impairment     blindness/albinism        Past Surgical History  Past Surgical History:   Procedure Laterality Date    benign ovarian cyst      ESOPHAGOGASTRODUODENOSCOPY (EGD) N/A 3/5/2018    [facility] ENDO    TONSILLECTOMY          Family History  Family History   Problem Relation Age of Onset    Fibrocystic breast disease Mother     Hyperlipidemia Mother     Hyperlipidemia Father     Polycythemia Maternal Aunt     Heart disease Maternal Grandfather     Hypertension Maternal Grandfather     Heart disease Paternal Grandfather     Hypertension Paternal Grandfather        Social History  Social History     Socioeconomic History    Marital status:      Spouse name: Not on file    Number of children: Not on file    Years of education: Not on file    Highest education level: Not on file   Occupational History    Not on file   Social Needs    Financial resource strain: Not on file    Food insecurity:     Worry: Not on file     Inability: Not on file    Transportation needs:     Medical: Not on file     Non-medical: Not on file   Tobacco Use    Smoking status: Never Smoker    Smokeless tobacco: Never Used   Substance and Sexual Activity    Alcohol use: Yes     Comment: Socially, 6  "marii weekly    Drug use: No    Sexual activity: Not on file   Lifestyle    Physical activity:     Days per week: Not on file     Minutes per session: Not on file    Stress: Not on file   Relationships    Social connections:     Talks on phone: Not on file     Gets together: Not on file     Attends Confucianism service: Not on file     Active member of club or organization: Not on file     Attends meetings of clubs or organizations: Not on file     Relationship status: Not on file   Other Topics Concern    Not on file   Social History Narrative    Not on file       Current Medications  Current Outpatient Medications on File Prior to Visit   Medication Sig Dispense Refill    ferrous sulfate 325 mg (65 mg iron) Tab tablet Take 1 tablet (325 mg total) by mouth daily with breakfast. 90 tablet 3    pantoprazole (PROTONIX) 40 MG tablet Take 1 tablet (40 mg total) by mouth once daily. 30 tablet 1    methylPREDNISolone (MEDROL DOSEPACK) 4 mg tablet use as directed 1 Package 0     No current facility-administered medications on file prior to visit.        Allergies   Review of patient's allergies indicates:  No Known Allergies    Review of Systems   Constitutional: Positive for chills and fatigue. Negative for fever.   HENT: Positive for congestion, postnasal drip, rhinorrhea, sinus pressure and sore throat. Negative for ear discharge.    Eyes: Negative for discharge.   Respiratory: Positive for cough (productive). Negative for shortness of breath and wheezing.    Cardiovascular: Negative for palpitations.   Gastrointestinal: Negative for abdominal pain, blood in stool, constipation and diarrhea.   Genitourinary: Negative for dysuria.   Skin: Negative for rash.     /62 (BP Location: Left arm, Patient Position: Sitting, BP Method: Medium (Automatic))   Pulse 103   Temp 98.2 °F (36.8 °C) (Oral)   Ht 5' 2" (1.575 m)   Wt 83.9 kg (184 lb 15.5 oz)   LMP 05/27/2019 (Approximate)   BMI 33.83 kg/m²     Physical " Exam   Constitutional: She appears well-developed. She appears ill. No distress.   HENT:   Head: Normocephalic and atraumatic.   Right Ear: Tympanic membrane, external ear and ear canal normal.   Left Ear: Tympanic membrane, external ear and ear canal normal.   Nose: Mucosal edema and rhinorrhea present.  No foreign bodies. Right sinus exhibits no maxillary sinus tenderness. Left sinus exhibits no maxillary sinus tenderness.   Mouth/Throat: Posterior oropharyngeal erythema present. No oropharyngeal exudate.   Eyes: Lids are normal.   Neck: Trachea normal and normal range of motion. No tracheal tenderness present. No thyroid mass present.   Cardiovascular: Normal rate, regular rhythm, normal heart sounds and intact distal pulses.   Pulmonary/Chest: Effort normal and breath sounds normal. No respiratory distress. She has no wheezes. She has no rales.   Lymphadenopathy:     She has no cervical adenopathy.   Psychiatric: She has a normal mood and affect. Her behavior is normal.   Vitals reviewed.    Assessment/Plan:    Diagnoses and all orders for this visit:    Viral URI  -     ketorolac injection 30 mg  -     ondansetron (ZOFRAN-ODT) 8 MG TbDL; Take 1 tablet (8 mg total) by mouth every 8 (eight) hours as needed.  -     ibuprofen (ADVIL,MOTRIN) 600 MG tablet; Take 1 tablet (600 mg total) by mouth every 6 (six) hours as needed for Pain. Take with a meal  Acute problem.  Discussed with patient her symptoms are consistent with a viral upper respiratory infection.  I offered to do a swab and send to the lab, for flu testing,because we did attempt here in the office, and the machine was not working.  She declined this because she states that even if was positive she would not take the flu medication because she cannot tolerate it.  She is taking it in the past.  In that case, I advised her to stay well hydrated, and treat symptomatically.  May use Zofran for nausea so she can keep fluids and medicines and food down.  Give  the patient a one time dose of Ketoralac in the office to help with pain. She may continue ibuprofen as prescribed to help with pains/aches, and sore throat.                -North Carias Jr., MD, AAHIVS          This office note has been dictated.  This dictation has been generated using M-Modal Fluency Direct dictation; some phonetic errors may occur.

## 2019-08-25 ENCOUNTER — HOSPITAL ENCOUNTER (EMERGENCY)
Facility: HOSPITAL | Age: 44
Discharge: HOME | End: 2019-08-25
Attending: EMERGENCY MEDICINE
Payer: MEDICARE

## 2019-08-25 VITALS
BODY MASS INDEX: 32.2 KG/M2 | OXYGEN SATURATION: 100 % | DIASTOLIC BLOOD PRESSURE: 54 MMHG | HEIGHT: 62 IN | SYSTOLIC BLOOD PRESSURE: 104 MMHG | RESPIRATION RATE: 18 BRPM | HEART RATE: 87 BPM | TEMPERATURE: 98 F | WEIGHT: 175 LBS

## 2019-08-25 DIAGNOSIS — R11.10 VOMITING, INTRACTABILITY OF VOMITING NOT SPECIFIED, PRESENCE OF NAUSEA NOT SPECIFIED, UNSPECIFIED VOMITING TYPE: Primary | ICD-10-CM

## 2019-08-25 DIAGNOSIS — R19.7 DIARRHEA, UNSPECIFIED TYPE: ICD-10-CM

## 2019-08-25 LAB
ALBUMIN SERPL BCP-MCNC: 4.2 G/DL (ref 3.5–5.2)
ALP SERPL-CCNC: 43 U/L (ref 55–135)
ALT SERPL W/O P-5'-P-CCNC: 20 U/L (ref 10–44)
ANION GAP SERPL CALC-SCNC: 12 MMOL/L (ref 8–16)
AST SERPL-CCNC: 22 U/L (ref 10–40)
B-HCG UR QL: NEGATIVE
BASOPHILS # BLD AUTO: 0.01 K/UL (ref 0–0.2)
BASOPHILS NFR BLD: 0.1 % (ref 0–1.9)
BILIRUB SERPL-MCNC: 0.2 MG/DL (ref 0.1–1)
BILIRUB UR QL STRIP: NEGATIVE
BUN SERPL-MCNC: 13 MG/DL (ref 6–20)
CALCIUM SERPL-MCNC: 8.9 MG/DL (ref 8.7–10.5)
CHLORIDE SERPL-SCNC: 106 MMOL/L (ref 95–110)
CLARITY UR: CLEAR
CO2 SERPL-SCNC: 20 MMOL/L (ref 23–29)
COLOR UR: YELLOW
CREAT SERPL-MCNC: 0.8 MG/DL (ref 0.5–1.4)
CTP QC/QA: YES
DIFFERENTIAL METHOD: ABNORMAL
EOSINOPHIL # BLD AUTO: 0.1 K/UL (ref 0–0.5)
EOSINOPHIL NFR BLD: 0.5 % (ref 0–8)
ERYTHROCYTE [DISTWIDTH] IN BLOOD BY AUTOMATED COUNT: 13 % (ref 11.5–14.5)
EST. GFR  (AFRICAN AMERICAN): >60 ML/MIN/1.73 M^2
EST. GFR  (NON AFRICAN AMERICAN): >60 ML/MIN/1.73 M^2
GLUCOSE SERPL-MCNC: 110 MG/DL (ref 70–110)
GLUCOSE UR QL STRIP: NEGATIVE
HCT VFR BLD AUTO: 38.7 % (ref 37–48.5)
HGB BLD-MCNC: 13 G/DL (ref 12–16)
HGB UR QL STRIP: NEGATIVE
KETONES UR QL STRIP: NEGATIVE
LEUKOCYTE ESTERASE UR QL STRIP: NEGATIVE
LIPASE SERPL-CCNC: 66 U/L (ref 4–60)
LYMPHOCYTES # BLD AUTO: 1.5 K/UL (ref 1–4.8)
LYMPHOCYTES NFR BLD: 16.1 % (ref 18–48)
MCH RBC QN AUTO: 30.4 PG (ref 27–31)
MCHC RBC AUTO-ENTMCNC: 33.6 G/DL (ref 32–36)
MCV RBC AUTO: 90 FL (ref 82–98)
MONOCYTES # BLD AUTO: 0.6 K/UL (ref 0.3–1)
MONOCYTES NFR BLD: 6.3 % (ref 4–15)
NEUTROPHILS # BLD AUTO: 7.3 K/UL (ref 1.8–7.7)
NEUTROPHILS NFR BLD: 77.2 % (ref 38–73)
NITRITE UR QL STRIP: NEGATIVE
PH UR STRIP: 6 [PH] (ref 5–8)
PLATELET # BLD AUTO: 256 K/UL (ref 150–350)
PMV BLD AUTO: 8.7 FL (ref 9.2–12.9)
POTASSIUM SERPL-SCNC: 4 MMOL/L (ref 3.5–5.1)
PROT SERPL-MCNC: 7.8 G/DL (ref 6–8.4)
PROT UR QL STRIP: NEGATIVE
RBC # BLD AUTO: 4.28 M/UL (ref 4–5.4)
SODIUM SERPL-SCNC: 138 MMOL/L (ref 136–145)
SP GR UR STRIP: 1.02 (ref 1–1.03)
URN SPEC COLLECT METH UR: NORMAL
UROBILINOGEN UR STRIP-ACNC: NEGATIVE EU/DL
WBC # BLD AUTO: 9.45 K/UL (ref 3.9–12.7)

## 2019-08-25 PROCEDURE — 63600175 PHARM REV CODE 636 W HCPCS: Performed by: EMERGENCY MEDICINE

## 2019-08-25 PROCEDURE — 96361 HYDRATE IV INFUSION ADD-ON: CPT

## 2019-08-25 PROCEDURE — 81003 URINALYSIS AUTO W/O SCOPE: CPT

## 2019-08-25 PROCEDURE — 96372 THER/PROPH/DIAG INJ SC/IM: CPT | Mod: 59

## 2019-08-25 PROCEDURE — 85025 COMPLETE CBC W/AUTO DIFF WBC: CPT

## 2019-08-25 PROCEDURE — 96375 TX/PRO/DX INJ NEW DRUG ADDON: CPT

## 2019-08-25 PROCEDURE — 25000003 PHARM REV CODE 250: Performed by: EMERGENCY MEDICINE

## 2019-08-25 PROCEDURE — 81025 URINE PREGNANCY TEST: CPT | Performed by: EMERGENCY MEDICINE

## 2019-08-25 PROCEDURE — 96374 THER/PROPH/DIAG INJ IV PUSH: CPT

## 2019-08-25 PROCEDURE — 99284 EMERGENCY DEPT VISIT MOD MDM: CPT | Mod: 25

## 2019-08-25 PROCEDURE — 80053 COMPREHEN METABOLIC PANEL: CPT

## 2019-08-25 PROCEDURE — 83690 ASSAY OF LIPASE: CPT

## 2019-08-25 RX ORDER — ONDANSETRON 2 MG/ML
4 INJECTION INTRAMUSCULAR; INTRAVENOUS
Status: COMPLETED | OUTPATIENT
Start: 2019-08-25 | End: 2019-08-25

## 2019-08-25 RX ORDER — ACETAMINOPHEN 500 MG
1000 TABLET ORAL
Status: COMPLETED | OUTPATIENT
Start: 2019-08-25 | End: 2019-08-25

## 2019-08-25 RX ORDER — METOCLOPRAMIDE HYDROCHLORIDE 5 MG/ML
10 INJECTION INTRAMUSCULAR; INTRAVENOUS
Status: COMPLETED | OUTPATIENT
Start: 2019-08-25 | End: 2019-08-25

## 2019-08-25 RX ORDER — DICYCLOMINE HYDROCHLORIDE 20 MG/1
20 TABLET ORAL 2 TIMES DAILY PRN
Qty: 8 TABLET | Refills: 0 | Status: SHIPPED | OUTPATIENT
Start: 2019-08-25 | End: 2019-09-24

## 2019-08-25 RX ORDER — DICYCLOMINE HYDROCHLORIDE 10 MG/ML
20 INJECTION INTRAMUSCULAR
Status: COMPLETED | OUTPATIENT
Start: 2019-08-25 | End: 2019-08-25

## 2019-08-25 RX ORDER — ONDANSETRON 4 MG/1
4 TABLET, FILM COATED ORAL EVERY 8 HOURS PRN
Qty: 12 TABLET | Refills: 0 | Status: SHIPPED | OUTPATIENT
Start: 2019-08-25 | End: 2021-02-22

## 2019-08-25 RX ADMIN — DICYCLOMINE HYDROCHLORIDE 20 MG: 20 INJECTION, SOLUTION INTRAMUSCULAR at 06:08

## 2019-08-25 RX ADMIN — SODIUM CHLORIDE 1000 ML: 0.9 INJECTION, SOLUTION INTRAVENOUS at 05:08

## 2019-08-25 RX ADMIN — ACETAMINOPHEN 1000 MG: 500 TABLET ORAL at 07:08

## 2019-08-25 RX ADMIN — ONDANSETRON 4 MG: 2 INJECTION INTRAMUSCULAR; INTRAVENOUS at 05:08

## 2019-08-25 RX ADMIN — METOCLOPRAMIDE 10 MG: 5 INJECTION, SOLUTION INTRAMUSCULAR; INTRAVENOUS at 07:08

## 2019-08-25 NOTE — ED NOTES
Pt resting comfortably and independently repositioned in stretcher with bed locked in lowest position for safety. NAD noted at this time. Respirations even and unlabored and visible chest rise noted.  Patient offered bathroom assistance and denies need at this time. Pt instructed to call if assistance is needed. Pt on continuous BP, and O2 monitoring. Call light within reach. Family at bedside. No needs at this time. Will continue to monitor.

## 2019-08-25 NOTE — ED PROVIDER NOTES
Encounter Date: 8/25/2019    SCRIBE #1 NOTE: I, Carlene Clay, am scribing for, and in the presence of,  Alfonzo Jackson MD. I have scribed the following portions of the note - Other sections scribed: HPI and ROS.       History     Chief Complaint   Patient presents with    Abdominal Pain     Pt here with c/o generalized abdominal pain with nausea, vomiting and diarrhea that began 3 hours ago.     Emesis    Diarrhea     CC: Emesis     HPI:  This is a 44 y.o. Female, with a PMHx of Albinism, who presents to the Emergency Department with a cc of emesis for approximately 2-4 hours. The patient reports associated nausea, diarrhea, chills, and frontal HA. She denies any chest pain, SOB, cough, fever, hematochezia, melena, hematemesis, or abdominal pain. There were no alleviating or worsening factors reported. Patient reports no prior history of similar symptoms. There has been no sick contact or recent travels.         The history is provided by the patient.     Review of patient's allergies indicates:  No Known Allergies  Past Medical History:   Diagnosis Date    H/O albinism     Vision impairment     blindness/albinism      Past Surgical History:   Procedure Laterality Date    benign ovarian cyst      ESOPHAGOGASTRODUODENOSCOPY (EGD) N/A 3/5/2018    Performed by Gurvinder Rawls [facility] at [facility] ENDO    TONSILLECTOMY       Family History   Problem Relation Age of Onset    Fibrocystic breast disease Mother     Hyperlipidemia Mother     Hyperlipidemia Father     Polycythemia Maternal Aunt     Heart disease Maternal Grandfather     Hypertension Maternal Grandfather     Heart disease Paternal Grandfather     Hypertension Paternal Grandfather      Social History     Tobacco Use    Smoking status: Never Smoker    Smokeless tobacco: Never Used   Substance Use Topics    Alcohol use: Yes     Comment: Socially, 6 beers weekly    Drug use: No     Review of Systems   Constitutional: Positive for chills. Negative  for fever.   Respiratory: Negative for cough and shortness of breath.    Cardiovascular: Negative for chest pain.   Gastrointestinal: Positive for diarrhea, nausea and vomiting. Negative for abdominal pain and blood in stool.        (-) Melena   (-) Hematemesis    Neurological: Positive for headaches.   All other systems reviewed and are negative.      Physical Exam     Initial Vitals [08/25/19 0411]   BP Pulse Resp Temp SpO2   117/66 93 18 97.8 °F (36.6 °C) 96 %      MAP       --         Physical Exam    Nursing note and vitals reviewed.  Constitutional: She appears well-developed and well-nourished. She is not diaphoretic. No distress.   HENT:   Head: Normocephalic and atraumatic.   Right Ear: External ear normal.   Left Ear: External ear normal.   Nose: Nose normal.   Mouth/Throat: Oropharynx is clear and moist.   Eyes: Conjunctivae and EOM are normal. Pupils are equal, round, and reactive to light. Right eye exhibits no discharge. Left eye exhibits no discharge. No scleral icterus.   Neck: Normal range of motion. Neck supple.   Cardiovascular: Normal rate, regular rhythm, normal heart sounds and intact distal pulses.   No murmur heard.  Pulmonary/Chest: Breath sounds normal. No respiratory distress. She has no wheezes. She has no rhonchi. She has no rales.   Abdominal: Soft. Bowel sounds are normal. She exhibits no distension and no mass. There is no tenderness. There is no rebound and no guarding.   Abdomen is soft and nondistended. Normal bowel sounds.  No tenderness on palpation in any of the 4 quadrants.  Specifically, there is no right upper quadrant or right lower quadrant tenderness.  No left lower quadrant tenderness.   Musculoskeletal: Normal range of motion. She exhibits no edema or tenderness.   Neurological: She is alert and oriented to person, place, and time. She has normal strength. No cranial nerve deficit or sensory deficit.   Skin: Skin is warm and dry. No rash noted. No erythema. No pallor.    Psychiatric: She has a normal mood and affect. Her behavior is normal. Judgment and thought content normal.         ED Course   Procedures  Labs Reviewed   CBC W/ AUTO DIFFERENTIAL - Abnormal; Notable for the following components:       Result Value    MPV 8.7 (*)     Gran% 77.2 (*)     Lymph% 16.1 (*)     All other components within normal limits   COMPREHENSIVE METABOLIC PANEL - Abnormal; Notable for the following components:    CO2 20 (*)     Alkaline Phosphatase 43 (*)     All other components within normal limits   LIPASE - Abnormal; Notable for the following components:    Lipase 66 (*)     All other components within normal limits   URINALYSIS, REFLEX TO URINE CULTURE    Narrative:     Preferred Collection Type->Urine, Clean Catch   POCT URINE PREGNANCY          Imaging Results    None          Medical Decision Making:   ED Management:  This is the emergent evaluation of a 44-year-old female presents emergency department complaining of nonbloody emesis and nonbloody diarrhea for the past several hours.  Differential diagnosis at the time of initial evaluation included, but was not limited to:  Viral illness, bacterial infection, gastritis, peptic ulcer disease, pancreatitis.  I considered but doubt other etiology such as acute appendicitis and diverticulitis, however, despite chief complaint listed on triage note, patient denies any abdominal pain other than discomfort when vomiting. No black or bloody stools.  No bloody emesis.  No coffee-grounds emesis.  The patient has chills but no fever.  She does not have a leukocytosis.  Her lipase is slightly elevated but she has no right upper quadrant tenderness.  I do not suspect surgical etiology of the patient's symptoms. I do not detect any evidence of peritonitis.  Patient is feeling better after ondansetron and fluids.      7:51 AM on re-evaluation, patient's abdomen continues to be soft and nontender.  She has not been vomiting in the emergency department  after receiving medications.  She feels better.  She tolerated oral fluids without difficulty.  She feels well enough to go home.  I will prescribe continue medications for her to take at home.  She was advised to follow up with her regular doctor this week and return to the emergency department immediately if she experiences any black or bloody stools, black or bloody vomitus, abdominal pain (especially in the right upper or right lower quadrants), or inability to tolerate fluids by mouth despite medication usage. Advised advancing her diet slowly as tolerated and avoiding spicy or greasy foods.            Scribe Attestation:   Scribe #1: I performed the above scribed service and the documentation accurately describes the services I performed. I attest to the accuracy of the note.    Scribe attestation: I, Alfonzo Jackson MD, personally performed the services described in this documentation. All medical record entries made by the scribe were at my direction and in my presence.  I have reviewed the chart and agree that the record reflects my personal performance and is accurate and complete.             Clinical Impression:     1. Vomiting, intractability of vomiting not specified, presence of nausea not specified, unspecified vomiting type    2. Diarrhea, unspecified type                                   Alfonzo Jackson Jr., MD  08/25/19 7161

## 2019-08-25 NOTE — ED TRIAGE NOTES
Pt presents to ED with c/o generalized abd pain, diarrhea and vomiting for the past 3 hours. She reports multiple episodes of diarrhea and vomiting at home. Pt has vomited twice since ED arrival and 1 episode of diarrhea. Family at bedside. No distress is noted. Will continue to be monitored.

## 2019-08-25 NOTE — DISCHARGE INSTRUCTIONS
Please follow up with your regular doctor this week.  Please return for any new or worsening symptoms such as black or bloody stools, black or bloody vomitus, inability take fluids by mouth despite taking medications prescribed, weakness, or any abdominal pain, especially pain in the right upper or right lower areas of the abdomen,  as discussed.

## 2019-10-01 ENCOUNTER — LAB VISIT (OUTPATIENT)
Dept: LAB | Facility: HOSPITAL | Age: 44
End: 2019-10-01
Payer: MEDICARE

## 2019-10-01 ENCOUNTER — TELEPHONE (OUTPATIENT)
Dept: INTERNAL MEDICINE | Facility: CLINIC | Age: 44
End: 2019-10-01

## 2019-10-01 ENCOUNTER — OFFICE VISIT (OUTPATIENT)
Dept: INTERNAL MEDICINE | Facility: CLINIC | Age: 44
End: 2019-10-01
Payer: MEDICARE

## 2019-10-01 VITALS
SYSTOLIC BLOOD PRESSURE: 134 MMHG | OXYGEN SATURATION: 98 % | TEMPERATURE: 98 F | HEART RATE: 79 BPM | BODY MASS INDEX: 32.74 KG/M2 | DIASTOLIC BLOOD PRESSURE: 74 MMHG | WEIGHT: 177.94 LBS | HEIGHT: 62 IN

## 2019-10-01 DIAGNOSIS — R10.13 EPIGASTRIC PAIN: Primary | ICD-10-CM

## 2019-10-01 DIAGNOSIS — K25.7 CHRONIC GASTRIC ULCER, UNSPECIFIED WHETHER GASTRIC ULCER HEMORRHAGE OR PERFORATION PRESENT: ICD-10-CM

## 2019-10-01 LAB
BASOPHILS # BLD AUTO: 0.01 K/UL (ref 0–0.2)
BASOPHILS NFR BLD: 0.1 % (ref 0–1.9)
DIFFERENTIAL METHOD: ABNORMAL
EOSINOPHIL # BLD AUTO: 0.1 K/UL (ref 0–0.5)
EOSINOPHIL NFR BLD: 1 % (ref 0–8)
ERYTHROCYTE [DISTWIDTH] IN BLOOD BY AUTOMATED COUNT: 12.3 % (ref 11.5–14.5)
HCT VFR BLD AUTO: 36.1 % (ref 37–48.5)
HGB BLD-MCNC: 12.8 G/DL (ref 12–16)
LYMPHOCYTES # BLD AUTO: 2.3 K/UL (ref 1–4.8)
LYMPHOCYTES NFR BLD: 25.9 % (ref 18–48)
MCH RBC QN AUTO: 31.1 PG (ref 27–31)
MCHC RBC AUTO-ENTMCNC: 35.5 G/DL (ref 32–36)
MCV RBC AUTO: 88 FL (ref 82–98)
MONOCYTES # BLD AUTO: 0.6 K/UL (ref 0.3–1)
MONOCYTES NFR BLD: 6.7 % (ref 4–15)
NEUTROPHILS # BLD AUTO: 5.9 K/UL (ref 1.8–7.7)
NEUTROPHILS NFR BLD: 66.3 % (ref 38–73)
PLATELET # BLD AUTO: 263 K/UL (ref 150–350)
PMV BLD AUTO: 8.6 FL (ref 9.2–12.9)
RBC # BLD AUTO: 4.12 M/UL (ref 4–5.4)
WBC # BLD AUTO: 8.91 K/UL (ref 3.9–12.7)

## 2019-10-01 PROCEDURE — 99213 PR OFFICE/OUTPT VISIT, EST, LEVL III, 20-29 MIN: ICD-10-PCS | Mod: S$GLB,,, | Performed by: INTERNAL MEDICINE

## 2019-10-01 PROCEDURE — 3008F PR BODY MASS INDEX (BMI) DOCUMENTED: ICD-10-PCS | Mod: CPTII,S$GLB,, | Performed by: INTERNAL MEDICINE

## 2019-10-01 PROCEDURE — 3008F BODY MASS INDEX DOCD: CPT | Mod: CPTII,S$GLB,, | Performed by: INTERNAL MEDICINE

## 2019-10-01 PROCEDURE — 99999 PR PBB SHADOW E&M-EST. PATIENT-LVL IV: CPT | Mod: PBBFAC,,, | Performed by: INTERNAL MEDICINE

## 2019-10-01 PROCEDURE — 99999 PR PBB SHADOW E&M-EST. PATIENT-LVL IV: ICD-10-PCS | Mod: PBBFAC,,, | Performed by: INTERNAL MEDICINE

## 2019-10-01 PROCEDURE — 36415 COLL VENOUS BLD VENIPUNCTURE: CPT

## 2019-10-01 PROCEDURE — 99213 OFFICE O/P EST LOW 20 MIN: CPT | Mod: S$GLB,,, | Performed by: INTERNAL MEDICINE

## 2019-10-01 PROCEDURE — 85025 COMPLETE CBC W/AUTO DIFF WBC: CPT

## 2019-10-01 RX ORDER — SUCRALFATE 1 G/1
1 TABLET ORAL
Qty: 120 TABLET | Refills: 0 | Status: SHIPPED | OUTPATIENT
Start: 2019-10-01 | End: 2019-10-01 | Stop reason: SDUPTHER

## 2019-10-01 RX ORDER — PANTOPRAZOLE SODIUM 40 MG/1
40 TABLET, DELAYED RELEASE ORAL 2 TIMES DAILY
Qty: 60 TABLET | Refills: 1 | Status: SHIPPED | OUTPATIENT
Start: 2019-10-01 | End: 2019-10-01 | Stop reason: SDUPTHER

## 2019-10-01 RX ORDER — SUCRALFATE 1 G/1
TABLET ORAL
Qty: 360 TABLET | Refills: 0 | Status: SHIPPED | OUTPATIENT
Start: 2019-10-01 | End: 2021-02-22

## 2019-10-01 RX ORDER — PANTOPRAZOLE SODIUM 40 MG/1
TABLET, DELAYED RELEASE ORAL
Qty: 180 TABLET | Refills: 1 | Status: SHIPPED | OUTPATIENT
Start: 2019-10-01 | End: 2021-02-22

## 2019-10-01 NOTE — PROGRESS NOTES
Subjective:       Patient ID: Jody Loza is a 44 y.o. female.    Chief Complaint: Abdominal Pain    44 year old lady with history of bleeding ulcers thinks her ulcers are back.  Stools are brown and no vomiting though she is occasionally queasy.  Has thoracic back pain which she remembers as associated with ulcers in past,  Had stopped her protonix but has now started again qd with some improvement in pain.    Review of Systems   Constitutional: Negative for activity change, chills, fatigue and fever.   HENT: Negative for congestion, ear pain, nosebleeds, postnasal drip, sinus pressure and sore throat.    Eyes: Negative.  Negative for visual disturbance.   Respiratory: Negative for cough, chest tightness, shortness of breath and wheezing.    Cardiovascular: Negative for chest pain.   Gastrointestinal: Negative for abdominal pain, diarrhea, nausea and vomiting.   Genitourinary: Negative for difficulty urinating, dysuria, frequency and urgency.   Musculoskeletal: Negative for arthralgias and neck stiffness.   Skin: Negative for rash.   Neurological: Negative for dizziness, weakness and headaches.   Psychiatric/Behavioral: Negative for sleep disturbance. The patient is not nervous/anxious.        Objective:      Physical Exam   Constitutional: She is oriented to person, place, and time. She appears well-developed and well-nourished.  Non-toxic appearance. No distress.   HENT:   Head: Normocephalic and atraumatic.   Right Ear: Tympanic membrane, external ear and ear canal normal.   Left Ear: Tympanic membrane, external ear and ear canal normal.   Eyes: Pupils are equal, round, and reactive to light. EOM are normal. No scleral icterus.   Neck: Normal range of motion. Neck supple. No thyromegaly present.   Cardiovascular: Normal rate, regular rhythm and normal heart sounds.   Pulmonary/Chest: Effort normal and breath sounds normal.   Abdominal: Soft. Bowel sounds are normal. She exhibits no mass. There is tenderness in  the epigastric area. There is no rigidity, no rebound and no guarding.   Musculoskeletal: Normal range of motion.   Lymphadenopathy:     She has no cervical adenopathy.   Neurological: She is alert and oriented to person, place, and time. She has normal reflexes. She displays normal reflexes. No cranial nerve deficit. She exhibits normal muscle tone. Coordination normal.   Skin: Skin is warm and dry.   Psychiatric: She has a normal mood and affect. Her behavior is normal.       Assessment:       1. Epigastric pain    2. Chronic gastric ulcer, unspecified whether gastric ulcer hemorrhage or perforation present        Plan:   Jody was seen today for abdominal pain.    Diagnoses and all orders for this visit:    Epigastric pain  -     Case request GI: ESOPHAGOGASTRODUODENOSCOPY (EGD)    Chronic gastric ulcer, unspecified whether gastric ulcer hemorrhage or perforation present  -     pantoprazole (PROTONIX) 40 MG tablet; Take 1 tablet (40 mg total) by mouth 2 (two) times daily.  -     CBC auto differential; Future  -     Ambulatory consult to Gastroenterology  -     Case request GI: ESOPHAGOGASTRODUODENOSCOPY (EGD)    Other orders  -     sucralfate (CARAFATE) 1 gram tablet; Take 1 tablet (1 g total) by mouth 4 (four) times daily before meals and nightly.

## 2019-10-02 ENCOUNTER — OFFICE VISIT (OUTPATIENT)
Dept: GASTROENTEROLOGY | Facility: CLINIC | Age: 44
End: 2019-10-02
Payer: MEDICARE

## 2019-10-02 ENCOUNTER — TELEPHONE (OUTPATIENT)
Dept: ENDOSCOPY | Facility: HOSPITAL | Age: 44
End: 2019-10-02

## 2019-10-02 ENCOUNTER — LAB VISIT (OUTPATIENT)
Dept: LAB | Facility: HOSPITAL | Age: 44
End: 2019-10-02
Payer: MEDICARE

## 2019-10-02 VITALS
BODY MASS INDEX: 33.18 KG/M2 | HEIGHT: 62 IN | DIASTOLIC BLOOD PRESSURE: 71 MMHG | WEIGHT: 180.31 LBS | HEART RATE: 64 BPM | SYSTOLIC BLOOD PRESSURE: 106 MMHG

## 2019-10-02 DIAGNOSIS — K21.9 GASTROESOPHAGEAL REFLUX DISEASE WITHOUT ESOPHAGITIS: Primary | ICD-10-CM

## 2019-10-02 DIAGNOSIS — K25.9 GASTRIC ULCER, UNSPECIFIED CHRONICITY, UNSPECIFIED WHETHER GASTRIC ULCER HEMORRHAGE OR PERFORATION PRESENT: ICD-10-CM

## 2019-10-02 PROCEDURE — 99499 UNLISTED E&M SERVICE: CPT | Mod: ,,, | Performed by: NURSE PRACTITIONER

## 2019-10-02 PROCEDURE — 99999 PR PBB SHADOW E&M-EST. PATIENT-LVL III: CPT | Mod: PBBFAC,,, | Performed by: NURSE PRACTITIONER

## 2019-10-02 PROCEDURE — 86677 HELICOBACTER PYLORI ANTIBODY: CPT

## 2019-10-02 PROCEDURE — 36415 COLL VENOUS BLD VENIPUNCTURE: CPT

## 2019-10-02 PROCEDURE — 99499 RISK ADDL DX/OHS AUDIT: ICD-10-PCS | Mod: ,,, | Performed by: NURSE PRACTITIONER

## 2019-10-02 PROCEDURE — 99999 PR PBB SHADOW E&M-EST. PATIENT-LVL III: ICD-10-PCS | Mod: PBBFAC,,, | Performed by: NURSE PRACTITIONER

## 2019-10-02 PROCEDURE — 99499 UNLISTED E&M SERVICE: CPT | Mod: S$GLB,,, | Performed by: NURSE PRACTITIONER

## 2019-10-02 PROCEDURE — 99214 PR OFFICE/OUTPT VISIT, EST, LEVL IV, 30-39 MIN: ICD-10-PCS | Mod: S$GLB,,, | Performed by: NURSE PRACTITIONER

## 2019-10-02 PROCEDURE — 3008F BODY MASS INDEX DOCD: CPT | Mod: CPTII,S$GLB,, | Performed by: NURSE PRACTITIONER

## 2019-10-02 PROCEDURE — 3008F PR BODY MASS INDEX (BMI) DOCUMENTED: ICD-10-PCS | Mod: CPTII,S$GLB,, | Performed by: NURSE PRACTITIONER

## 2019-10-02 PROCEDURE — 99214 OFFICE O/P EST MOD 30 MIN: CPT | Mod: S$GLB,,, | Performed by: NURSE PRACTITIONER

## 2019-10-02 PROCEDURE — 99499 RISK ADDL DX/OHS AUDIT: ICD-10-PCS | Mod: S$GLB,,, | Performed by: NURSE PRACTITIONER

## 2019-10-02 NOTE — PROGRESS NOTES
[facility] Consultation Note    Reason for Consult:  The primary encounter diagnosis was Gastroesophageal reflux disease without esophagitis. A diagnosis of Gastric ulcer, unspecified chronicity, unspecified whether gastric ulcer hemorrhage or perforation present was also pertinent to this visit.    PCP:   Primary Doctor No   [id number] / [place] [address]    Referring MD:  Echo Ortiz Md  [address] [place] [address]    HPI:  This is a 44 y.o. female here for evaluation of gastric ulcer f/u.    EGD 3/2018 Gastric ulcers, hematemesis. Never followed up.    Having these symptoms for the past couple months  Starting to have stomach pain that travels to back . Pain is worse with stressed or mad  + nausea. No vomiting, hematemesis.  No melena.  Feeling light headed and weak.   +Zofran helps  Has been on pantoprazole for the past couple mos.   Prescribed Carafate yesterday, has not started yet.  Was told to take PPI bid,   No NSAIDs      ROS:  Constitutional: No fevers, chills, No weight loss. See HPI.  ENT: No allergies  CV: No chest pain  Pulm: No cough, +shortness of breath  Ophtho: No vision changes  GI: see HPI  Endo: Temperature fluctuation    Medical History:  has a past medical history of H/O albinism and Vision impairment.    Surgical History:  has a past surgical history that includes Tonsillectomy and benign ovarian cyst.    Family History: family history includes Colon polyps in her mother and paternal grandmother; Fibrocystic breast disease in her mother; Heart disease in her maternal grandfather and paternal grandfather; Hyperlipidemia in her father and mother; Hypertension in her maternal grandfather and paternal grandfather; Polycythemia in her maternal aunt..     Social History:  reports that she has never smoked. She has never used smokeless tobacco. She reports that she drinks alcohol. She reports that she does not use drugs.    Review of patient's  "allergies indicates:  No Known Allergies    Current Outpatient Medications on File Prior to Visit   Medication Sig Dispense Refill    pantoprazole (PROTONIX) 40 MG tablet TAKE 1 TABLET(40 MG) BY MOUTH TWICE DAILY 180 tablet 1    ferrous sulfate 325 mg (65 mg iron) Tab tablet Take 1 tablet (325 mg total) by mouth daily with breakfast. (Patient not taking: Reported on 10/2/2019) 90 tablet 3    methylPREDNISolone (MEDROL DOSEPACK) 4 mg tablet use as directed (Patient not taking: Reported on 10/2/2019) 1 Package 0    ondansetron (ZOFRAN) 4 MG tablet Take 1 tablet (4 mg total) by mouth every 8 (eight) hours as needed for Nausea. 12 tablet 0    ondansetron (ZOFRAN-ODT) 8 MG TbDL Take 1 tablet (8 mg total) by mouth every 8 (eight) hours as needed. 30 tablet 0    sucralfate (CARAFATE) 1 gram tablet TAKE 1 TABLET(1 GRAM) BY MOUTH FOUR TIMES DAILY BEFORE MEALS AND AT NIGHT (Patient not taking: Reported on 10/2/2019) 360 tablet 0     No current facility-administered medications on file prior to visit.          Objective Findings:    Vital Signs:  /71 (BP Location: Right arm)   Pulse 64   Ht 5' 2" (1.575 m)   Wt 81.8 kg (180 lb 5.4 oz)   BMI 32.98 kg/m²   Body mass index is 32.98 kg/m².    Physical Exam:  General Appearance: Well appearing in no acute distress  Head:   Normocephalic, without obvious abnormality  Eyes:    No scleral icterus  ENT: Neck supple  Lungs: CTA bilaterally in anterior and posterior fields, no wheezes, no crackles.  Heart:  Regular rate and rhythm, S1, S2 normal, no murmurs heard  Extremities: No edema  Skin: No rash, albinism  Neurologic: AAO x 3      Labs:  Lab Results   Component Value Date    WBC 8.91 10/01/2019    HGB 12.8 10/01/2019    HCT 36.1 (L) 10/01/2019     10/01/2019    CHOL 159 07/31/2014    TRIG 73 07/31/2014    HDL 51 07/31/2014    ALT 20 08/25/2019    AST 22 08/25/2019     08/25/2019    K 4.0 08/25/2019     08/25/2019    CREATININE 0.8 08/25/2019    BUN " 13 08/25/2019    CO2 20 (L) 08/25/2019    TSH 3.899 10/13/2014    INR 1.1 03/03/2018    HGBA1C 5.0 03/05/2018       Imaging:  CT abdomen and pelvis 03/20/2018 reviewed.  Unremarkable.  Endoscopy:    EGD 3/2018 Gastric ulcers, hematemesis.    Assessment:    Ms. Loza is a 44 y.o. F with:    1. Gastroesophageal reflux disease without esophagitis    2. Gastric ulcer, unspecified chronicity, unspecified whether gastric ulcer hemorrhage or perforation present       She believes her ulcer is back.  She never followed up for repeat EGD to check for ulcer healing last year.  She requested to meet with the dietitian to discuss reflux - healthy diet.  Today we discussed medication regimen, need to rule out H pylori as it can cause ulcers and reflux diet.    Recommendations:  1.  Labs  2.  EGD  3.  Continue with Protonix and Carafate  4.  Me with GI dietitian    Follow-up pending EGD    Order summary:  Orders Placed This Encounter    H.Pylori Antibody IgG     25 MINUTES TOTAL FACE TO FACE >50% SPENT IN COUNSELING AND COORDINATION OF CARE     Thank you so much for allowing me to participate in the care of Jody Loza    Jossie Deluca, YRN-C

## 2019-10-02 NOTE — TELEPHONE ENCOUNTER
----- Message from Tiffanie Hodgson sent at 10/2/2019  4:02 PM CDT -----  Contact: [phone]  Pt is requesting call back,Pt is very upset and stated appointments made needs to be reschedule for the [facility] location not [place],only day pt can have the procedure is Oct 14,2019 or November 11,2019. Please call and advise       Thank you

## 2019-10-02 NOTE — PATIENT INSTRUCTIONS
Start taking crafate four times a day before meals.  Continue with protonix daily.   If the carafate does not provide relief, then take protonix twice a day.    Http://www.refluxcookbook.com/  Dropping Acid The Reflux Diet Cookbook and Cure -  Hong Bland M.D.    GERD  Worst Foods for Acid Reflux  Chocolate (milk chocolate worse than dark chocolate)  Soda (all carbonated beverages)  Alcohol (beer, liquor, wine)  Fried foods  Hunt, sausage, ribs  Cream sauce  Fatty meats (beef)  Butter, margarine, lard, shortening  Coffee, tea  Mint   High fat nuts  Hot sauces and pepper  Citrus fruit/juices      Acidic foods (pH - 1 is MORE acidic, 5 is LESS acidic)     Do not eat or drink these (lower numbers are worse)    Induction diet - For 2 weeks eat nothing below pH 5     Lemon juice 2.3  Grape cranberry juice 2.5  Stomach Acid 2.5  Gelatin Dessert 2.6  Lemon/lime 2.9/2.7  Vinegar 2.9  Gatorade 3.0  Fruits - plums, apricots, strawberries, cherries 3.0  Vitamin C (ascorbic acid) 3.0  Iced tea, Snapple 3.1  Mustard 3.2  Soft drinks 3.3  Nectarines 3.3  Pomegranate 3.3  Applesauce 3.4  Grapefruit 3.4  Kiwi 3.4  Barbecue sauce 3.4  Caesar dressing 3.5  Thousand island dressing 3.6  Strawberries 3.5  Pineapple juice 3.5  Beer 3.5  Wine 3.5  Grape 3.6  Apples 3.6  Pineapple 3.7  Pickle 3.7  Blackberries, blueberries 3.7  Madhu 3.7  Orange 3.8  Cherries 3.9  Red Bull 3.9  Tomatoes 4.2  Coffee 5.1      These are Safe foods:  Agave  Aloe Vera  Apple (only red)  Bagels  Banana (worsens reflux in 1%)  Beans - black, red, lima, lentils  Bread - whole grain, rye  Caramel  Celery  Chamomile tea  Chicken - skinless, never fried  Chicken stock or bouillon  Coffee - one cup/day with milk  Fennel  Fish  Jacqueline  Green vegetables (no green peppers)  Herbs  Honey  Melon  Milk - skin, soy, or Lactaid skim milk  Mushrooms  Oatmeal  Olive oil  Parsley  Pasta  Pears  Popcorn  Potatoes  Red bell peppers  Rice  Soups  Tofu  Turkey  Breast  Turnip  Vegetables - no onion, tomatoes, peppers  Vinaigrette  Water - non carbonated  Whole grain breads, crackers, breakfast cereals      Best Foods for Acid Reflux  Whole grain breads  Oatmeal  Aloe Vera  Salad (no tomatoes, onions, cheese, or high fat dressing)  Banana  Melon  Fennel  Chicken and turkey (skinless, never fried)  Fish/seafood (never fried)  Celery  Parsley  Couscous and Rice    Maybe bad foods (Everyone is unique)  Tomatoes  Garlic  Onion  Nuts (macadamia nuts)  Apples (especially green)  Cucumber  Green peppers  Spicy food  Some herbal teas    GERD tips  Change what you eat:  Eat smaller meals  Eat slowly and chew thoroughly until food is almost liquid  Cut down on junk carbohydrates such as sugar and white flour  Use herbs in your cooking  Eat more raw foods (more than 10 ingredients is not a raw food)  Avoid trans fats and partially hydrogenated oils  Eat more fish and switch to grass fed beef  Switch your cooking oil to macadamia nut or olive oil  Watch extremes of salt intake (too high or too low is bad)    If just cutting out acidic foods is not enough, change how you eat:  Large breakfast, medium lunch, light dinner  Dont mix fruit juices, sweet fruits, and refined starches with meats and heavy food  Dont wash your food down with a lot of liquid      Change these habits:  Stop smoking  Eat dinner earlier (3-4 hours before lying down to sleep)  Elevate the head of your bed 6 inches (blocks under the head of the bed are better than pillows) OR [employer] sells a special [employer] Reflux relief system  That may be purchased online for a comfortable, individual solution for raising the head of the bed.  Exercise (but wait 2 hours after eating)  Drink more water (between meals)    Take these supplements:  Multi vitamin  Probiotic  Fish oil    Most common food allergens: milk, eggs, peanuts, tree nuts, fish, shellfish, wheat, and soy    All natural immediate relief:  Chew 2-3 soft  probiotic capsules - Dr. Link's Probiotics 12 Plus  Chew chewable DGL licorice tablet  Chew papaya tablet with high protein meal - [employer]  Drink 2 ounces of aloe vera juice  Swedish bitters  Prelief- reduce the acid in food to keep it form burning sensitive tissue  [employer]  [place]  Drink Chamomile Tea  Teaspoon of baking soda in water  Spoonful of vinegar in water      All natural ulcer healers:  Zinc carnosine - 75.5 mg with food twice a day x 8 weeks   Obed Membreno - $8 for 60 pills  DGL (deglycyrrhizinated licorice) - 2 tablets before meals. Heals stomach lining   [employer] brand, Enzymatic therapy brand.  Aloe Vera juice  - 2 to 8 ounces a day   [place] or [place]

## 2019-10-02 NOTE — TELEPHONE ENCOUNTER
Called and spoke to the patient she will call [phone] in the a.m to try to move the appointment to the [facility] she asked me to leave the message on her phone which I did.

## 2019-10-02 NOTE — TELEPHONE ENCOUNTER
"Pt , Anil,  called endoscopy scheduling nurse at [phone] to schedule Pt EGD. Pt  verified with endoscopy scheduling nurse Pt would like to be scheduled for EGD at [facility], Pt  stated that Pt was "just seen by Jossie". Pt was seen by Jossie Deluca NP gastroenterology at [facility]. Pt  Allergies, medications, and history was reviewed with Pt . Pt  stated that the Pt is only available on October 14, 2019 or November 11, 2019 to schedule EGD. Both dates were reviewed for availability. Pt was scheduled on November 11, 2019 with Dr. Bletran at 1300 with 1215 arrival to accomodate Pt's schedule/availability. Pt prep instructions mailed to Pt, address verified at time of scheduling. Pt  verbalized understanding.   "

## 2019-10-03 ENCOUNTER — TELEPHONE (OUTPATIENT)
Dept: INTERNAL MEDICINE | Facility: CLINIC | Age: 44
End: 2019-10-03

## 2019-10-03 NOTE — TELEPHONE ENCOUNTER
----- Message from Ulices Carty sent at 10/3/2019 11:28 AM CDT -----  Contact: Kateryna/ Anil [phone]  He said you referred the patient to a dietician and he wants to know if a referral needs to be put in. I advised him to call his insurance company to see if a referral is necessary in order for them to pay. He still asked me to transfer him to the dietician.    Thank you

## 2019-10-07 LAB — H PYLORI IGG SERPL QL IA: NEGATIVE

## 2019-10-08 ENCOUNTER — HOSPITAL ENCOUNTER (EMERGENCY)
Facility: HOSPITAL | Age: 44
Discharge: HOME | End: 2019-10-08
Attending: EMERGENCY MEDICINE
Payer: MEDICARE

## 2019-10-08 ENCOUNTER — TELEPHONE (OUTPATIENT)
Dept: GASTROENTEROLOGY | Facility: CLINIC | Age: 44
End: 2019-10-08

## 2019-10-08 VITALS
BODY MASS INDEX: 31.01 KG/M2 | DIASTOLIC BLOOD PRESSURE: 58 MMHG | WEIGHT: 175 LBS | TEMPERATURE: 99 F | OXYGEN SATURATION: 100 % | HEART RATE: 72 BPM | HEIGHT: 63 IN | SYSTOLIC BLOOD PRESSURE: 111 MMHG | RESPIRATION RATE: 24 BRPM

## 2019-10-08 DIAGNOSIS — R07.9 CHEST PAIN: Primary | ICD-10-CM

## 2019-10-08 DIAGNOSIS — R00.2 PALPITATIONS: ICD-10-CM

## 2019-10-08 LAB
ALBUMIN SERPL BCP-MCNC: 4.7 G/DL (ref 3.5–5.2)
ALP SERPL-CCNC: 46 U/L (ref 55–135)
ALT SERPL W/O P-5'-P-CCNC: 13 U/L (ref 10–44)
ANION GAP SERPL CALC-SCNC: 9 MMOL/L (ref 8–16)
AST SERPL-CCNC: 20 U/L (ref 10–40)
BASOPHILS # BLD AUTO: 0.01 K/UL (ref 0–0.2)
BASOPHILS NFR BLD: 0.2 % (ref 0–1.9)
BILIRUB SERPL-MCNC: 0.6 MG/DL (ref 0.1–1)
BNP SERPL-MCNC: 73 PG/ML (ref 0–99)
BUN SERPL-MCNC: 9 MG/DL (ref 6–20)
CALCIUM SERPL-MCNC: 9.6 MG/DL (ref 8.7–10.5)
CHLORIDE SERPL-SCNC: 105 MMOL/L (ref 95–110)
CO2 SERPL-SCNC: 26 MMOL/L (ref 23–29)
CREAT SERPL-MCNC: 0.8 MG/DL (ref 0.5–1.4)
DIFFERENTIAL METHOD: ABNORMAL
EOSINOPHIL # BLD AUTO: 0.1 K/UL (ref 0–0.5)
EOSINOPHIL NFR BLD: 1.1 % (ref 0–8)
ERYTHROCYTE [DISTWIDTH] IN BLOOD BY AUTOMATED COUNT: 12.3 % (ref 11.5–14.5)
EST. GFR  (AFRICAN AMERICAN): >60 ML/MIN/1.73 M^2
EST. GFR  (NON AFRICAN AMERICAN): >60 ML/MIN/1.73 M^2
GLUCOSE SERPL-MCNC: 92 MG/DL (ref 70–110)
HCT VFR BLD AUTO: 39.7 % (ref 37–48.5)
HGB BLD-MCNC: 13.5 G/DL (ref 12–16)
INR PPP: 1 (ref 0.8–1.2)
LYMPHOCYTES # BLD AUTO: 2 K/UL (ref 1–4.8)
LYMPHOCYTES NFR BLD: 31.5 % (ref 18–48)
MCH RBC QN AUTO: 30.6 PG (ref 27–31)
MCHC RBC AUTO-ENTMCNC: 34 G/DL (ref 32–36)
MCV RBC AUTO: 90 FL (ref 82–98)
MONOCYTES # BLD AUTO: 0.5 K/UL (ref 0.3–1)
MONOCYTES NFR BLD: 8.1 % (ref 4–15)
NEUTROPHILS # BLD AUTO: 3.7 K/UL (ref 1.8–7.7)
NEUTROPHILS NFR BLD: 59.3 % (ref 38–73)
PLATELET # BLD AUTO: 298 K/UL (ref 150–350)
PMV BLD AUTO: 8.7 FL (ref 9.2–12.9)
POTASSIUM SERPL-SCNC: 3.9 MMOL/L (ref 3.5–5.1)
PROT SERPL-MCNC: 8.1 G/DL (ref 6–8.4)
PROTHROMBIN TIME: 10.7 SEC (ref 9–12.5)
RBC # BLD AUTO: 4.41 M/UL (ref 4–5.4)
SODIUM SERPL-SCNC: 140 MMOL/L (ref 136–145)
TROPONIN I SERPL DL<=0.01 NG/ML-MCNC: <0.006 NG/ML (ref 0–0.03)
WBC # BLD AUTO: 6.29 K/UL (ref 3.9–12.7)

## 2019-10-08 PROCEDURE — 93005 ELECTROCARDIOGRAM TRACING: CPT

## 2019-10-08 PROCEDURE — 85025 COMPLETE CBC W/AUTO DIFF WBC: CPT

## 2019-10-08 PROCEDURE — 80053 COMPREHEN METABOLIC PANEL: CPT

## 2019-10-08 PROCEDURE — 93010 EKG 12-LEAD: ICD-10-PCS | Mod: ,,, | Performed by: INTERNAL MEDICINE

## 2019-10-08 PROCEDURE — 83880 ASSAY OF NATRIURETIC PEPTIDE: CPT

## 2019-10-08 PROCEDURE — 25000003 PHARM REV CODE 250: Performed by: EMERGENCY MEDICINE

## 2019-10-08 PROCEDURE — 99285 EMERGENCY DEPT VISIT HI MDM: CPT | Mod: 25

## 2019-10-08 PROCEDURE — 84484 ASSAY OF TROPONIN QUANT: CPT

## 2019-10-08 PROCEDURE — 93010 ELECTROCARDIOGRAM REPORT: CPT | Mod: ,,, | Performed by: INTERNAL MEDICINE

## 2019-10-08 PROCEDURE — 85610 PROTHROMBIN TIME: CPT

## 2019-10-08 RX ORDER — NAPROXEN SODIUM 220 MG/1
324 TABLET, FILM COATED ORAL ONCE
Status: COMPLETED | OUTPATIENT
Start: 2019-10-08 | End: 2019-10-08

## 2019-10-08 RX ADMIN — ASPIRIN 81 MG 324 MG: 81 TABLET ORAL at 10:10

## 2019-10-08 NOTE — TELEPHONE ENCOUNTER
MA contacted pt to give test results per Jossie. Pt did not answer.            ----- Message from Jossie Deluca NP sent at 10/8/2019  8:31 AM CDT -----  H. Pylori Negative

## 2019-10-08 NOTE — ED TRIAGE NOTES
"Patient states for last 2 weeks she has not felt "right".  Weakness, intermittent chest pain that is sometimes right sided sometimes left, states dizziness at timess, head tingles at times.  Also states that shebelieves her bleeding ulcer has returned and may be causing all these symptoms.  States she has abdominal pain that goes to her back, pain is intermittent and is sharp shooting.  "

## 2019-10-08 NOTE — ED PROVIDER NOTES
Encounter Date: 10/8/2019    SCRIBE #1 NOTE: I, Keli Clay, am scribing for, and in the presence of,  Delonte Cruz MD. I have scribed the following portions of the note - the EKG reading. Other sections scribed: HPI, ROS, PE.       History     Chief Complaint   Patient presents with    Chest Pain     Pt reports intermittent midsternal cp x 2 weeks. Pt also c/o dizziness,HA, SOB and tingling.     This 44 y.o female presents to the ED for an evaluation for intermittent midsternal chest pain described as a pressure and tightness x 1 week. She reports the pain intermittently radiates down her left upper extremity. Patient also reports of palpitations, fatigue, dizziness, lightheadedness, abdominal pain, and paresthesias.  She reports the chest pain are not related with exertion.  Patient reports she initially assumed her symptoms were related to gastric ulcers and states she is scheduled to have an EGD in November.  She denies fever, chills, nausea, emesis, diarrhea, calf pain, leg swelling, or any other associated symptoms.  No prior tx.      The history is provided by the patient.     Review of patient's allergies indicates:  No Known Allergies  Past Medical History:   Diagnosis Date    H/O albinism     Ulcer of abdomen wall     Vision impairment     blindness/albinism      Past Surgical History:   Procedure Laterality Date    benign ovarian cyst      TONSILLECTOMY       Family History   Problem Relation Age of Onset    Fibrocystic breast disease Mother     Hyperlipidemia Mother     Colon polyps Mother     Hyperlipidemia Father     Polycythemia Maternal Aunt     Heart disease Maternal Grandfather     Hypertension Maternal Grandfather     Heart disease Paternal Grandfather     Hypertension Paternal Grandfather     Colon polyps Paternal Grandmother     Celiac disease Neg Hx     Colon cancer Neg Hx     Crohn's disease Neg Hx     Cystic fibrosis Neg Hx     Esophageal cancer Neg Hx      Irritable bowel syndrome Neg Hx     Inflammatory bowel disease Neg Hx     Liver cancer Neg Hx     Liver disease Neg Hx     Rectal cancer Neg Hx     Stomach cancer Neg Hx     Ulcerative colitis Neg Hx      Social History     Tobacco Use    Smoking status: Never Smoker    Smokeless tobacco: Never Used   Substance Use Topics    Alcohol use: Yes     Comment: Socially, 6 beers weekly    Drug use: No     Review of Systems   Constitutional: Positive for fatigue. Negative for chills and fever.   HENT: Negative for congestion, ear pain, rhinorrhea and sore throat.    Eyes: Negative for pain and visual disturbance.   Respiratory: Negative for cough and shortness of breath.    Cardiovascular: Positive for chest pain and palpitations. Negative for leg swelling.   Gastrointestinal: Positive for abdominal pain. Negative for diarrhea, nausea and vomiting.   Genitourinary: Negative for dysuria.   Musculoskeletal: Negative for back pain and neck pain.        (+) left arm pain   Skin: Negative for rash.   Neurological: Positive for dizziness and light-headedness. Negative for headaches.        (+) paresthesias       Physical Exam     Initial Vitals [10/08/19 0922]   BP Pulse Resp Temp SpO2   128/64 76 14 98.2 °F (36.8 °C) 100 %      MAP       --         Physical Exam    Nursing note and vitals reviewed.  Constitutional: She is not diaphoretic. No distress.   HENT:   Head: Normocephalic and atraumatic.   Mouth/Throat: Oropharynx is clear and moist.   Eyes: EOM are normal. Pupils are equal, round, and reactive to light. No scleral icterus.   Some amblyopia   Neck: Normal range of motion. Neck supple. No JVD present.   Cardiovascular: Normal rate, regular rhythm, normal heart sounds and intact distal pulses. Exam reveals no gallop and no friction rub.    No murmur heard.  Pulmonary/Chest: Breath sounds normal. No stridor. No respiratory distress. She has no wheezes. She has no rhonchi. She has no rales. She exhibits no  tenderness.   Abdominal: Soft. Bowel sounds are normal. She exhibits no distension. There is no tenderness.   Musculoskeletal: Normal range of motion. She exhibits no edema or tenderness.   Neurological: She is alert and oriented to person, place, and time. She has normal strength. No cranial nerve deficit or sensory deficit. GCS score is 15. GCS eye subscore is 4. GCS verbal subscore is 5. GCS motor subscore is 6.   Skin: Skin is warm and dry. Capillary refill takes less than 2 seconds. No rash noted.   Psychiatric: She has a normal mood and affect.         ED Course   Procedures  Labs Reviewed   CBC W/ AUTO DIFFERENTIAL - Abnormal; Notable for the following components:       Result Value    MPV 8.7 (*)     All other components within normal limits   COMPREHENSIVE METABOLIC PANEL - Abnormal; Notable for the following components:    Alkaline Phosphatase 46 (*)     All other components within normal limits   B-TYPE NATRIURETIC PEPTIDE   TROPONIN I   PROTIME-INR     EKG Readings: (Independently Interpreted)   Initial Reading: No STEMI. Rhythm: Normal Sinus Rhythm (Sinus Rhythm). Heart Rate: 73 bpm. Ectopy: No Ectopy. Conduction: Normal. ST Segments: Normal ST Segments. T Waves: Normal.     ECG Results          EKG 12-lead (In process)  Result time 10/08/19 10:35:39    In process by Interface, Lab In [facility] (10/08/19 10:35:39)                 Narrative:    Test Reason : R07.9,    Vent. Rate : 073 BPM     Atrial Rate : 073 BPM     P-R Int : 156 ms          QRS Dur : 074 ms      QT Int : 390 ms       P-R-T Axes : 021 019 030 degrees     QTc Int : 429 ms    Normal sinus rhythm  Low voltage QRS  Cannot rule out Anterior infarct ,age undetermined  Abnormal ECG  When compared with ECG of 03-MAR-2018 07:45,  No significant change was found    Referred By: [email or web address]   SELF           Confirmed By:                   In process by Interface, Lab In [facility] (10/08/19 10:34:58)                 Narrative:    Test Reason :  R07.9,    Vent. Rate : 073 BPM     Atrial Rate : 073 BPM     P-R Int : 156 ms          QRS Dur : 074 ms      QT Int : 390 ms       P-R-T Axes : 021 019 030 degrees     QTc Int : 429 ms    Normal sinus rhythm  Low voltage QRS  Cannot rule out Anterior infarct ,age undetermined  Abnormal ECG  When compared with ECG of 03-MAR-2018 07:45,  No significant change was found    Referred By: [email or web address]   SELF           Confirmed By:                             Imaging Results          X-Ray Chest PA And Lateral (Final result)  Result time 10/08/19 10:09:48    Final result by Nabeel Carr MD (10/08/19 10:09:48)                 Impression:      No acute abnormality.      Electronically signed by: Nabeel Carr MD  Date:    10/08/2019  Time:    10:09             Narrative:    EXAMINATION:  XR CHEST PA AND LATERAL    CLINICAL HISTORY:  Chest Pain;    TECHNIQUE:  PA and lateral views of the chest were performed.    COMPARISON:  None    FINDINGS:  The lungs are clear, with normal appearance of pulmonary vasculature and no pleural effusion or pneumothorax.    The cardiac silhouette is normal in size. The hilar and mediastinal contours are unremarkable.    Bones are intact.                              X-Rays:   Independently Interpreted Readings:   Chest X-Ray: Normal heart size.  No infiltrates.  No acute abnormalities.     Medical Decision Making:   History:   Old Medical Records: I decided to obtain old medical records.  Initial Assessment:   44 y.o female presenting to the ED for an evaluation for a 1 week h/o intermittent chest pain and palpitations.  Associated symptoms includes abdominal discomfort, dizziness and lightheadedness.  On exam, patient is awake, alert and oriented. Heart sounds normal. Breath sounds normal.   Differential Diagnosis:   Differential Diagnosis includes, but is not limited to:  ACS/MI, PE, cardiac tamponade, pericarditis/myocarditis, costochondritis/pleurisy, MSK pain/contusion, GERD, biliary  disease, pancreatitis, anemia, others  Independently Interpreted Test(s):   I have ordered and independently interpreted EKG Reading(s) - see prior notes  Clinical Tests:   Lab Tests: Ordered and Reviewed  Radiological Study: Ordered and Reviewed  Medical Tests: Ordered and Reviewed  ED Management:  While in the ED, patient was administered 324 mg ASA. EKG shows no ischemic changes.     Patient has had episodes of palpitations that are brief and otherwise relatively asymptomatic.  Although at times she does get little lightheaded with the palpitations.  She states this has been intermittently ongoing for decades but has increased recently in the past week.  States she has had a prior remote cardiac workup that did not show any etiology for the palpitations.  She states these are not related to exertion or any particular activity.  Time she has mild chest pressure that seems to radiate to her left arm.  No chest pain at this time.  No pain with breathing or dyspnea on exertion.  Patient states she does get some epigastric abdominal pain intermittently that she attributes to GERD and has an EGD planned for November.  No abdominal pain at this time.  In the emergency room patient is throwing occasional PACs.  These were not captured on EKG.  EKG shows no significant abnormalities.  There were no runs of ectopic beats while in the emergency room.  Patient's vital signs are stable.  Patient is not tachypneic or during my evaluation is although nursing reported respiratory rate of 24 on last vital signs. Troponin is negative. Patient's last symptomatology was greater than 4 hr ago.  I do not hear any murmurs.  patient needs about further evaluation with echocardiogram and Holter monitor.  Heart score is low and patient does not require admission to the hospital.  Will place an ambulatory referral for cardiac evaluation.  Patient states understanding of and appears comfortable with the plan.     Additional MDM:   Heart  Score:    History:          Slightly suspicious.  ECG:             Normal  Age:               Less than 45 years  Risk factors: no risk factors known  Troponin:       Less than or equal to normal limit  Final Score: 0             Scribe Attestation:   Scribe #1: I performed the above scribed service and the documentation accurately describes the services I performed. I attest to the accuracy of the note.               Clinical Impression:       ICD-10-CM ICD-9-CM   1. Chest pain R07.9 786.50   2. Palpitations R00.2 785.1               Scribe Attestation: I, Delonte Cruz, personally performed the services described in this documentation. All medical record entries made by the scribe were at my direction and in my presence. I have reviewed the chart and agree that the record reflects my personal performance and is accurate and complete.                 Delonte Cruz MD  10/08/19 [id number]

## 2019-10-16 ENCOUNTER — OFFICE VISIT (OUTPATIENT)
Dept: CARDIOLOGY | Facility: CLINIC | Age: 44
End: 2019-10-16
Payer: MEDICARE

## 2019-10-16 VITALS
OXYGEN SATURATION: 99 % | DIASTOLIC BLOOD PRESSURE: 80 MMHG | RESPIRATION RATE: 16 BRPM | BODY MASS INDEX: 30.85 KG/M2 | HEART RATE: 73 BPM | WEIGHT: 174.19 LBS | SYSTOLIC BLOOD PRESSURE: 124 MMHG

## 2019-10-16 DIAGNOSIS — R07.9 CHEST PAIN, UNSPECIFIED TYPE: Primary | ICD-10-CM

## 2019-10-16 DIAGNOSIS — E03.9 HYPOTHYROID: ICD-10-CM

## 2019-10-16 DIAGNOSIS — R00.2 PALPITATIONS: ICD-10-CM

## 2019-10-16 PROCEDURE — 99999 PR PBB SHADOW E&M-EST. PATIENT-LVL III: CPT | Mod: PBBFAC,,, | Performed by: INTERNAL MEDICINE

## 2019-10-16 PROCEDURE — 99204 OFFICE O/P NEW MOD 45 MIN: CPT | Mod: S$GLB,,, | Performed by: INTERNAL MEDICINE

## 2019-10-16 PROCEDURE — 99999 PR PBB SHADOW E&M-EST. PATIENT-LVL III: ICD-10-PCS | Mod: PBBFAC,,, | Performed by: INTERNAL MEDICINE

## 2019-10-16 PROCEDURE — 3008F BODY MASS INDEX DOCD: CPT | Mod: CPTII,S$GLB,, | Performed by: INTERNAL MEDICINE

## 2019-10-16 PROCEDURE — 99204 PR OFFICE/OUTPT VISIT, NEW, LEVL IV, 45-59 MIN: ICD-10-PCS | Mod: S$GLB,,, | Performed by: INTERNAL MEDICINE

## 2019-10-16 PROCEDURE — 3008F PR BODY MASS INDEX (BMI) DOCUMENTED: ICD-10-PCS | Mod: CPTII,S$GLB,, | Performed by: INTERNAL MEDICINE

## 2019-10-16 NOTE — LETTER
October 16, 2019      Delonte Cruz MD  [address] [place] [address]           [facility]  [address] [place] [address]  Phone: [phone]          Patient: Jody Loza   MR Number: [id number]   YOB: 1975   Date of Visit: 10/16/2019       Dear Dr. Delonte Cruz:    Thank you for referring Jody Loza to me for evaluation. Attached you will find relevant portions of my assessment and plan of care.    If you have questions, please do not hesitate to call me. I look forward to following Jody Loza along with you.    Sincerely,    Jessica Tomas MD    Enclosure  CC:  No Recipients    If you would like to receive this communication electronically, please contact [email or web address] or [phone] to request more information on [facility] access.    For providers and/or their staff who would like to refer a patient to [facility], please contact us through our one-stop-shop provider referral line, [facility], at [phone].    If you feel you have received this communication in error or would no longer like to receive these types of communications, please e-mail [email or web address]

## 2019-10-16 NOTE — PROGRESS NOTES
CARDIOVASCULAR CONSULTATION    REASON FOR CONSULT:   Jody Loza is a 44 y.o. female who presents for evaluation of chest pains, palpitations, fatigue.      HISTORY OF PRESENT ILLNESS:   Patient is a pleasant 44-year-old lady.  Here for follow-up.  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  Lately has been feeling chest pains which are substernal in location and occur on exertion.  Denies orthopnea, PND, swelling of feet.  Came to the emergency room because of the symptoms.  Record coronary syndrome was ruled out and was discharged for cardiology follow-up.  Also has been complaining of palpitations which occur daily.  Feels like her heart is racing very fast and then lot of extra beats.  Also complains of fatigue and dyspnea on exertion.        PAST MEDICAL HISTORY:     Past Medical History:   Diagnosis Date    H/O albinism     Ulcer of abdomen wall     Vision impairment     blindness/albinism        PAST SURGICAL HISTORY:     Past Surgical History:   Procedure Laterality Date    benign ovarian cyst      TONSILLECTOMY         ALLERGIES AND MEDICATION:   Review of patient's allergies indicates:  No Known Allergies     Medication List           Accurate as of October 16, 2019  2:50 PM. If you have any questions, ask your nurse or doctor.               CONTINUE taking these medications    ferrous sulfate 325 mg (65 mg iron) Tab tablet  Commonly known as:  FEOSOL  Take 1 tablet (325 mg total) by mouth daily with breakfast.     methylPREDNISolone 4 mg tablet  Commonly known as:  MEDROL DOSEPACK  use as directed     ondansetron 4 MG tablet  Commonly known as:  ZOFRAN  Take 1 tablet (4 mg total) by mouth every 8 (eight) hours as needed for Nausea.     ondansetron 8 MG Tbdl  Commonly known as:  ZOFRAN-ODT  Take 1 tablet (8 mg total) by mouth every 8 (eight) hours as needed.     pantoprazole 40 MG tablet  Commonly known as:  PROTONIX  TAKE 1 TABLET(40 MG) BY MOUTH TWICE DAILY     sucralfate 1 gram tablet  Commonly known as:   CARAFATE  TAKE 1 TABLET(1 GRAM) BY MOUTH FOUR TIMES DAILY BEFORE MEALS AND AT NIGHT            SOCIAL HISTORY:     Social History     Socioeconomic History    Marital status:      Spouse name: Not on file    Number of children: Not on file    Years of education: Not on file    Highest education level: Not on file   Occupational History    Not on file   Social Needs    Financial resource strain: Not on file    Food insecurity:     Worry: Not on file     Inability: Not on file    Transportation needs:     Medical: Not on file     Non-medical: Not on file   Tobacco Use    Smoking status: Never Smoker    Smokeless tobacco: Never Used   Substance and Sexual Activity    Alcohol use: Yes     Comment: Socially, 6 beers weekly    Drug use: No    Sexual activity: Not on file   Lifestyle    Physical activity:     Days per week: Not on file     Minutes per session: Not on file    Stress: Not on file   Relationships    Social connections:     Talks on phone: Not on file     Gets together: Not on file     Attends [employer] service: Not on file     Active member of club or organization: Not on file     Attends meetings of clubs or organizations: Not on file     Relationship status: Not on file   Other Topics Concern    Not on file   Social History Narrative    Not on file       FAMILY HISTORY:     Family History   Problem Relation Age of Onset    Fibrocystic breast disease Mother     Hyperlipidemia Mother     Colon polyps Mother     Hyperlipidemia Father     Polycythemia Maternal Aunt     Heart disease Maternal Grandfather     Hypertension Maternal Grandfather     Heart disease Paternal Grandfather     Hypertension Paternal Grandfather     Colon polyps Paternal Grandmother     Celiac disease Neg Hx     Colon cancer Neg Hx     Crohn's disease Neg Hx     Cystic fibrosis Neg Hx     Esophageal cancer Neg Hx     Irritable bowel syndrome Neg Hx     Inflammatory bowel disease Neg Hx     Liver  cancer Neg Hx     Liver disease Neg Hx     Rectal cancer Neg Hx     Stomach cancer Neg Hx     Ulcerative colitis Neg Hx        REVIEW OF SYSTEMS:   Review of Systems   Constitution: Positive for malaise/fatigue.   HENT: Negative.    Eyes: Negative.    Cardiovascular: Positive for chest pain, dyspnea on exertion and palpitations.   Respiratory: Negative.    Endocrine: Negative.    Hematologic/Lymphatic: Negative.    Skin: Negative.    Musculoskeletal: Negative.    Gastrointestinal: Negative.    Genitourinary: Negative.    Neurological: Negative.    Psychiatric/Behavioral: Negative.    Allergic/Immunologic: Negative.        A 10 point review of systems was performed and all the pertinent positives have been mentioned. Rest of review of systems was negative.        PHYSICAL EXAM:     Vitals:    10/16/19 1438   BP: 124/80   Pulse: 73   Resp: 16    Body mass index is 30.85 kg/m².  Weight: 79 kg (174 lb 2.6 oz)         Physical Exam   Constitutional: She is oriented to person, place, and time. She appears well-developed and well-nourished.   HENT:   Head: Normocephalic.   Eyes: Pupils are equal, round, and reactive to light.   Neck: Normal range of motion. Neck supple.   Cardiovascular: Normal rate and regular rhythm.   Pulmonary/Chest: Effort normal and breath sounds normal.   Abdominal: Soft. Normal appearance and bowel sounds are normal. There is no tenderness.   Musculoskeletal: Normal range of motion.   Neurological: She is alert and oriented to person, place, and time.   Skin: Skin is warm.   Psychiatric: She has a normal mood and affect.         DATA:     Laboratory:  CBC:  Recent Labs   Lab 08/25/19  0453 10/01/19  1658 10/08/19  0955   WBC 9.45 8.91 6.29   Hemoglobin 13.0 12.8 13.5   Hematocrit 38.7 36.1 L 39.7   Platelets 256 263 298       CHEMISTRIES:  Recent Labs   Lab 03/04/18  0428 08/25/19  0453 10/08/19  0955   Glucose 90 110 92   Sodium 143 138 140   Potassium 4.1 4.0 3.9   BUN, Bld 11 13 9    Creatinine 0.7 0.8 0.8   eGFR if African American >60 >60 >60   eGFR if non African American >60 >60 >60   Calcium 7.4 L 8.9 9.6       CARDIAC BIOMARKERS:  Recent Labs   Lab 03/03/18  0747 10/08/19  0955   Troponin I <0.006 <0.006       COAGS:  Recent Labs   Lab 03/03/18  0747 10/08/19  0955   INR 1.1 1.0       LIPIDS/LFTS:  Recent Labs   Lab 03/03/18  0747 08/25/19  0453 10/08/19  0955   AST 12 22 20   ALT 11 20 13       Hemoglobin A1C   Date Value Ref Range Status   03/05/2018 5.0 4.0 - 5.6 % Final     Comment:     According to ADA guidelines, hemoglobin A1c <7.0% represents  optimal control in non-pregnant diabetic patients. Different  metrics may apply to specific patient populations.   Standards of Medical Care in Diabetes-2016.  For the purpose of screening for the presence of diabetes:  <5.7%     Consistent with the absence of diabetes  5.7-6.4%  Consistent with increasing risk for diabetes   (prediabetes)  >or=6.5%  Consistent with diabetes  Currently, no consensus exists for use of hemoglobin A1c  for diagnosis of diabetes for children.  This Hemoglobin A1c assay has significant interference with fetal   hemoglobin   (HbF). The results are invalid for patients with abnormal amounts of   HbF,   including those with known Hereditary Persistence   of Fetal Hemoglobin. Heterozygous hemoglobin variants (HbAS, HbAC,   HbAD, HbAE, HbA2) do not significantly interfere with this assay;   however, presence of multiple variants in a sample may impact the %   interference.         TSH        The ASCVD Risk score (Ruben DC Jr., et al., 2013) failed to calculate for the following reasons:    Cannot find a previous HDL lab    Cannot find a previous total cholesterol lab           Cardiovascular Testing:    EKG: (personally reviewed tracing)  Normal sinus rhythm, low-voltage QRS      ASSESSMENT AND PLAN     Patient Active Problem List   Diagnosis    H/O albinism    Acute upper GI bleed    Anemia due to acute blood loss     Acute gastric ulcer with hemorrhage    Gastroesophageal reflux disease without esophagitis         Chest pain, dyspnea on exertion:  Check stress test and echocardiogram    Fatigue:  Check TSH    Palpitations:  Check Holter monitoring.        Thank you very much for involving me in the care of your patient.  Please do not hesitate to contact me if there are any questions.      Jessica Tomas MD, FACC, [facility]  Interventional Cardiologist, [facility].     Follow-up after cardiac testing      This note was dictated with the help of speech recognition software.  There might be un-intended errors and/or substitutions.

## 2019-10-23 ENCOUNTER — HOSPITAL ENCOUNTER (OUTPATIENT)
Dept: RADIOLOGY | Facility: HOSPITAL | Age: 44
Discharge: HOME | End: 2019-10-23
Attending: INTERNAL MEDICINE
Payer: MEDICARE

## 2019-10-23 ENCOUNTER — HOSPITAL ENCOUNTER (OUTPATIENT)
Dept: CARDIOLOGY | Facility: HOSPITAL | Age: 44
Discharge: HOME | End: 2019-10-23
Attending: INTERNAL MEDICINE
Payer: MEDICARE

## 2019-10-23 VITALS — HEIGHT: 63 IN | WEIGHT: 174 LBS | BODY MASS INDEX: 30.83 KG/M2

## 2019-10-23 DIAGNOSIS — R07.9 CHEST PAIN, UNSPECIFIED TYPE: ICD-10-CM

## 2019-10-23 LAB
AORTIC ROOT ANNULUS: 3.07 CM
AORTIC VALVE CUSP SEPERATION: 2.4 CM
ASCENDING AORTA: 2.68 CM
AV INDEX (PROSTH): 0.79
AV MEAN GRADIENT: 4 MMHG
AV PEAK GRADIENT: 7 MMHG
AV VALVE AREA: 2.17 CM2
AV VELOCITY RATIO: 0.76
BSA FOR ECHO PROCEDURE: 1.87 M2
CV ECHO LV RWT: 0.28 CM
CV STRESS BASE HR: 70 BPM
DIASTOLIC BLOOD PRESSURE: 68 MMHG
DOP CALC AO PEAK VEL: 1.34 M/S
DOP CALC AO VTI: 27.2 CM
DOP CALC LVOT AREA: 2.7 CM2
DOP CALC LVOT DIAMETER: 1.87 CM
DOP CALC LVOT PEAK VEL: 1.02 M/S
DOP CALC LVOT STROKE VOLUME: 58.96 CM3
DOP CALCLVOT PEAK VEL VTI: 21.48 CM
E WAVE DECELERATION TIME: 241.51 MSEC
E/A RATIO: 1.36
E/E' RATIO: 5.53 M/S
ECHO LV POSTERIOR WALL: 0.69 CM (ref 0.6–1.1)
FRACTIONAL SHORTENING: 29 % (ref 28–44)
INTERVENTRICULAR SEPTUM: 0.67 CM (ref 0.6–1.1)
IVRT: 0.09 MSEC
LA MAJOR: 4.55 CM
LA MINOR: 4.7 CM
LA WIDTH: 3.4 CM
LEFT ATRIUM SIZE: 3.51 CM
LEFT ATRIUM VOLUME INDEX: 25.7 ML/M2
LEFT ATRIUM VOLUME: 46.9 CM3
LEFT INTERNAL DIMENSION IN SYSTOLE: 3.47 CM (ref 2.1–4)
LEFT VENTRICLE DIASTOLIC VOLUME INDEX: 60.86 ML/M2
LEFT VENTRICLE DIASTOLIC VOLUME: 110.92 ML
LEFT VENTRICLE MASS INDEX: 58 G/M2
LEFT VENTRICLE SYSTOLIC VOLUME INDEX: 27.3 ML/M2
LEFT VENTRICLE SYSTOLIC VOLUME: 49.69 ML
LEFT VENTRICULAR INTERNAL DIMENSION IN DIASTOLE: 4.86 CM (ref 3.5–6)
LEFT VENTRICULAR MASS: 105.31 G
LV LATERAL E/E' RATIO: 5.19 M/S
LV SEPTAL E/E' RATIO: 5.93 M/S
MV PEAK A VEL: 0.61 M/S
MV PEAK E VEL: 0.83 M/S
NUC REST EJECTION FRACTION: 64
OHS CV CPX 1 MINUTE RECOVERY HEART RATE: 129 BPM
OHS CV CPX 85 PERCENT MAX PREDICTED HEART RATE MALE: 142
OHS CV CPX ESTIMATED METS: 6.9
OHS CV CPX MAX PREDICTED HEART RATE: 167
OHS CV CPX PATIENT IS FEMALE: 1
OHS CV CPX PATIENT IS MALE: 0
OHS CV CPX PEAK DIASTOLIC BLOOD PRESSURE: 98 MMHG
OHS CV CPX PEAK HEAR RATE: 153 BPM
OHS CV CPX PEAK RATE PRESSURE PRODUCT: NORMAL
OHS CV CPX PEAK SYSTOLIC BLOOD PRESSURE: 168 MMHG
OHS CV CPX PERCENT MAX PREDICTED HEART RATE ACHIEVED: 91
OHS CV CPX RATE PRESSURE PRODUCT PRESENTING: 7840
PISA TR MAX VEL: 2.12 M/S
PV PEAK VELOCITY: 0.98 CM/S
RA MAJOR: 4.23 CM
RA PRESSURE: 3 MMHG
RA WIDTH: 3.21 CM
RIGHT VENTRICULAR END-DIASTOLIC DIMENSION: 2.62 CM
SINUS: 2.79 CM
STJ: 2.64 CM
STRESS ECHO POST EXERCISE DUR MIN: 6 MINUTES
STRESS ECHO POST EXERCISE DUR SEC: 32 SECONDS
STRESS ECHO TARGET HR: 149.6 BPM
SYSTOLIC BLOOD PRESSURE: 112 MMHG
TDI LATERAL: 0.16 M/S
TDI SEPTAL: 0.14 M/S
TDI: 0.15 M/S
TR MAX PG: 18 MMHG
TRICUSPID ANNULAR PLANE SYSTOLIC EXCURSION: 2.66 CM
TV REST PULMONARY ARTERY PRESSURE: 21 MMHG

## 2019-10-23 PROCEDURE — 78452 STRESS TEST WITH MYOCARDIAL PERFUSION (CUPID ONLY): ICD-10-PCS | Mod: 26,,, | Performed by: INTERNAL MEDICINE

## 2019-10-23 PROCEDURE — 93016 STRESS TEST WITH MYOCARDIAL PERFUSION (CUPID ONLY): ICD-10-PCS | Mod: ,,, | Performed by: INTERNAL MEDICINE

## 2019-10-23 PROCEDURE — 93018 STRESS TEST WITH MYOCARDIAL PERFUSION (CUPID ONLY): ICD-10-PCS | Mod: ,,, | Performed by: INTERNAL MEDICINE

## 2019-10-23 PROCEDURE — A9502 TC99M TETROFOSMIN: HCPCS

## 2019-10-23 PROCEDURE — 93227 HOLTER MONITOR - 24 HOUR (CUPID ONLY): ICD-10-PCS | Mod: ,,, | Performed by: INTERNAL MEDICINE

## 2019-10-23 PROCEDURE — 93226 XTRNL ECG REC<48 HR SCAN A/R: CPT

## 2019-10-23 PROCEDURE — 93306 TTE W/DOPPLER COMPLETE: CPT | Mod: 26,,, | Performed by: INTERNAL MEDICINE

## 2019-10-23 PROCEDURE — 93306 TTE W/DOPPLER COMPLETE: CPT

## 2019-10-23 PROCEDURE — 93227 XTRNL ECG REC<48 HR R&I: CPT | Mod: ,,, | Performed by: INTERNAL MEDICINE

## 2019-10-23 PROCEDURE — 93018 CV STRESS TEST I&R ONLY: CPT | Mod: ,,, | Performed by: INTERNAL MEDICINE

## 2019-10-23 PROCEDURE — 93306 ECHO (CUPID ONLY): ICD-10-PCS | Mod: 26,,, | Performed by: INTERNAL MEDICINE

## 2019-10-23 PROCEDURE — 78452 HT MUSCLE IMAGE SPECT MULT: CPT | Mod: 26,,, | Performed by: INTERNAL MEDICINE

## 2019-10-23 PROCEDURE — 93017 CV STRESS TEST TRACING ONLY: CPT

## 2019-10-23 PROCEDURE — 93016 CV STRESS TEST SUPVJ ONLY: CPT | Mod: ,,, | Performed by: INTERNAL MEDICINE

## 2019-10-23 NOTE — PROGRESS NOTES
BELTRAN contacted Jody Loza at [phone] she stated she was doing okay that she was locked out of her portal and couldnt get any information for her appt today, but the issue was resolved. [employer] contacted APS to report the information that was passed on to her from Director. The APS worker Tabitha stated that whoever thinks the pt is being abused would have to call the local [facility] department for a well ness check, [phone] because they do not do wellness checks. APS worker stated she would talk to her supervisor about the case and call the SW back to let her know if the case will be accepted.

## 2019-10-24 ENCOUNTER — PATIENT OUTREACH (OUTPATIENT)
Dept: ADMINISTRATIVE | Facility: OTHER | Age: 44
End: 2019-10-24

## 2019-10-24 DIAGNOSIS — Z12.31 BREAST CANCER SCREENING BY MAMMOGRAM: Primary | ICD-10-CM

## 2019-10-25 LAB
OHS CV EVENT MONITOR DAY: 1
OHS CV HOLTER LENGTH DECIMAL HOURS: 47.98
OHS CV HOLTER LENGTH HOURS: 23
OHS CV HOLTER LENGTH MINUTES: 59

## 2019-10-28 ENCOUNTER — OFFICE VISIT (OUTPATIENT)
Dept: CARDIOLOGY | Facility: CLINIC | Age: 44
End: 2019-10-28
Payer: MEDICARE

## 2019-10-28 ENCOUNTER — LAB VISIT (OUTPATIENT)
Dept: LAB | Facility: HOSPITAL | Age: 44
End: 2019-10-28
Attending: INTERNAL MEDICINE
Payer: MEDICARE

## 2019-10-28 VITALS
DIASTOLIC BLOOD PRESSURE: 58 MMHG | RESPIRATION RATE: 16 BRPM | HEIGHT: 63 IN | WEIGHT: 176.56 LBS | OXYGEN SATURATION: 100 % | SYSTOLIC BLOOD PRESSURE: 115 MMHG | HEART RATE: 78 BPM | BODY MASS INDEX: 31.29 KG/M2

## 2019-10-28 DIAGNOSIS — E03.9 HYPOTHYROID: ICD-10-CM

## 2019-10-28 DIAGNOSIS — F41.9 ANXIETY: Primary | ICD-10-CM

## 2019-10-28 DIAGNOSIS — R07.9 CHEST PAIN, UNSPECIFIED TYPE: ICD-10-CM

## 2019-10-28 LAB — TSH SERPL DL<=0.005 MIU/L-ACNC: 1.74 UIU/ML (ref 0.4–4)

## 2019-10-28 PROCEDURE — 99999 PR PBB SHADOW E&M-EST. PATIENT-LVL III: ICD-10-PCS | Mod: PBBFAC,,, | Performed by: INTERNAL MEDICINE

## 2019-10-28 PROCEDURE — 99214 PR OFFICE/OUTPT VISIT, EST, LEVL IV, 30-39 MIN: ICD-10-PCS | Mod: S$GLB,,, | Performed by: INTERNAL MEDICINE

## 2019-10-28 PROCEDURE — 3008F BODY MASS INDEX DOCD: CPT | Mod: CPTII,S$GLB,, | Performed by: INTERNAL MEDICINE

## 2019-10-28 PROCEDURE — 36415 COLL VENOUS BLD VENIPUNCTURE: CPT

## 2019-10-28 PROCEDURE — 84443 ASSAY THYROID STIM HORMONE: CPT

## 2019-10-28 PROCEDURE — 3008F PR BODY MASS INDEX (BMI) DOCUMENTED: ICD-10-PCS | Mod: CPTII,S$GLB,, | Performed by: INTERNAL MEDICINE

## 2019-10-28 PROCEDURE — 99999 PR PBB SHADOW E&M-EST. PATIENT-LVL III: CPT | Mod: PBBFAC,,, | Performed by: INTERNAL MEDICINE

## 2019-10-28 PROCEDURE — 99214 OFFICE O/P EST MOD 30 MIN: CPT | Mod: S$GLB,,, | Performed by: INTERNAL MEDICINE

## 2019-10-28 RX ORDER — METOPROLOL SUCCINATE 25 MG/1
25 TABLET, EXTENDED RELEASE ORAL DAILY
Qty: 90 TABLET | Refills: 3 | Status: SHIPPED | OUTPATIENT
Start: 2019-10-28 | End: 2021-02-22

## 2019-10-28 NOTE — PROGRESS NOTES
CARDIOVASCULAR CONSULTATION    REASON FOR CONSULT:   Jody Loza is a 44 y.o. female who presents for evaluation of chest pains, palpitations, fatigue.      HISTORY OF PRESENT ILLNESS:   Patient is a pleasant 44-year-old lady.  Here for follow-up.  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  Lately has been feeling chest pains which are substernal in location and occur on exertion.  Denies orthopnea, PND, swelling of feet.  Came to the emergency room because of the symptoms.  Record coronary syndrome was ruled out and was discharged for cardiology follow-up.  Also has been complaining of palpitations which occur daily.  Feels like her heart is racing very fast and then lot of extra beats.  Also complains of fatigue and dyspnea on exertion.  Notes from October 2019:    Patient here for follow-up.  States has lot of anxiety.  Still has a palpitations.  Stress test did not show any significant ischemia.  2D echocardiogram showed normal left ventricle systolic function.  Holter showed frequent PACs.  No significant arrhythmia was noted.  Multiple complaints of chest pain, heart skipping, dizziness lightheadedness nausea dyspnea were noted in the Holter but without any corresponding symptoms.  Patient feels that the symptoms are worse when she is at work and more anxious.      PAST MEDICAL HISTORY:     Past Medical History:   Diagnosis Date    H/O albinism     Ulcer of abdomen wall     Vision impairment     blindness/albinism        PAST SURGICAL HISTORY:     Past Surgical History:   Procedure Laterality Date    benign ovarian cyst      TONSILLECTOMY         ALLERGIES AND MEDICATION:   Review of patient's allergies indicates:  No Known Allergies     Medication List           Accurate as of October 28, 2019  2:44 PM. If you have any questions, ask your nurse or doctor.               CONTINUE taking these medications    ferrous sulfate 325 mg (65 mg iron) Tab tablet  Commonly known as:  FEOSOL  Take 1 tablet (325 mg total) by  mouth daily with breakfast.     methylPREDNISolone 4 mg tablet  Commonly known as:  MEDROL DOSEPACK  use as directed     ondansetron 4 MG tablet  Commonly known as:  ZOFRAN  Take 1 tablet (4 mg total) by mouth every 8 (eight) hours as needed for Nausea.     ondansetron 8 MG Tbdl  Commonly known as:  ZOFRAN-ODT  Take 1 tablet (8 mg total) by mouth every 8 (eight) hours as needed.     pantoprazole 40 MG tablet  Commonly known as:  PROTONIX  TAKE 1 TABLET(40 MG) BY MOUTH TWICE DAILY     sucralfate 1 gram tablet  Commonly known as:  CARAFATE  TAKE 1 TABLET(1 GRAM) BY MOUTH FOUR TIMES DAILY BEFORE MEALS AND AT NIGHT            SOCIAL HISTORY:     Social History     Socioeconomic History    Marital status:      Spouse name: Not on file    Number of children: Not on file    Years of education: Not on file    Highest education level: Not on file   Occupational History    Not on file   Social Needs    Financial resource strain: Not on file    Food insecurity:     Worry: Not on file     Inability: Not on file    Transportation needs:     Medical: Not on file     Non-medical: Not on file   Tobacco Use    Smoking status: Never Smoker    Smokeless tobacco: Never Used   Substance and Sexual Activity    Alcohol use: Yes     Comment: Socially, 6 beers weekly    Drug use: No    Sexual activity: Not on file   Lifestyle    Physical activity:     Days per week: Not on file     Minutes per session: Not on file    Stress: Not on file   Relationships    Social connections:     Talks on phone: Not on file     Gets together: Not on file     Attends Sikh service: Not on file     Active member of club or organization: Not on file     Attends meetings of clubs or organizations: Not on file     Relationship status: Not on file   Other Topics Concern    Not on file   Social History Narrative    Not on file       FAMILY HISTORY:     Family History   Problem Relation Age of Onset    Fibrocystic breast disease Mother   "   Hyperlipidemia Mother     Colon polyps Mother     Hyperlipidemia Father     Polycythemia Maternal Aunt     Heart disease Maternal Grandfather     Hypertension Maternal Grandfather     Heart disease Paternal Grandfather     Hypertension Paternal Grandfather     Colon polyps Paternal Grandmother     Celiac disease Neg Hx     Colon cancer Neg Hx     Crohn's disease Neg Hx     Cystic fibrosis Neg Hx     Esophageal cancer Neg Hx     Irritable bowel syndrome Neg Hx     Inflammatory bowel disease Neg Hx     Liver cancer Neg Hx     Liver disease Neg Hx     Rectal cancer Neg Hx     Stomach cancer Neg Hx     Ulcerative colitis Neg Hx        REVIEW OF SYSTEMS:   Review of Systems   Constitution: Positive for malaise/fatigue.   HENT: Negative.    Eyes: Negative.    Cardiovascular: Positive for palpitations.   Respiratory: Negative.    Endocrine: Negative.    Hematologic/Lymphatic: Negative.    Skin: Negative.    Musculoskeletal: Negative.    Gastrointestinal: Negative.    Genitourinary: Negative.    Neurological: Negative.    Psychiatric/Behavioral: Negative.    Allergic/Immunologic: Negative.        A 10 point review of systems was performed and all the pertinent positives have been mentioned. Rest of review of systems was negative.        PHYSICAL EXAM:     Vitals:    10/28/19 1416   BP: (!) 115/58   Pulse: 78   Resp: 16    Body mass index is 31.28 kg/m².  Weight: 80.1 kg (176 lb 9.4 oz)   Height: 5' 3" (160 cm)     Physical Exam   Constitutional: She is oriented to person, place, and time. She appears well-developed and well-nourished.   HENT:   Head: Normocephalic.   Eyes: Pupils are equal, round, and reactive to light.   Neck: Normal range of motion. Neck supple.   Cardiovascular: Normal rate and regular rhythm.   Pulmonary/Chest: Effort normal and breath sounds normal.   Abdominal: Soft. Normal appearance and bowel sounds are normal. There is no tenderness.   Musculoskeletal: Normal range of " motion.   Neurological: She is alert and oriented to person, place, and time.   Skin: Skin is warm.   Psychiatric: She has a normal mood and affect.         DATA:     Laboratory:  CBC:  Recent Labs   Lab 08/25/19  0453 10/01/19  1658 10/08/19  0955   WBC 9.45 8.91 6.29   Hemoglobin 13.0 12.8 13.5   Hematocrit 38.7 36.1 L 39.7   Platelets 256 263 298       CHEMISTRIES:  Recent Labs   Lab 03/04/18  0428 08/25/19  0453 10/08/19  0955   Glucose 90 110 92   Sodium 143 138 140   Potassium 4.1 4.0 3.9   BUN, Bld 11 13 9   Creatinine 0.7 0.8 0.8   eGFR if African American >60 >60 >60   eGFR if non African American >60 >60 >60   Calcium 7.4 L 8.9 9.6       CARDIAC BIOMARKERS:  Recent Labs   Lab 03/03/18  0747 10/08/19  0955   Troponin I <0.006 <0.006       COAGS:  Recent Labs   Lab 03/03/18  0747 10/08/19  0955   INR 1.1 1.0       LIPIDS/LFTS:  Recent Labs   Lab 03/03/18  0747 08/25/19  0453 10/08/19  0955   AST 12 22 20   ALT 11 20 13       Hemoglobin A1C   Date Value Ref Range Status   03/05/2018 5.0 4.0 - 5.6 % Final     Comment:     According to ADA guidelines, hemoglobin A1c <7.0% represents  optimal control in non-pregnant diabetic patients. Different  metrics may apply to specific patient populations.   Standards of Medical Care in Diabetes-2016.  For the purpose of screening for the presence of diabetes:  <5.7%     Consistent with the absence of diabetes  5.7-6.4%  Consistent with increasing risk for diabetes   (prediabetes)  >or=6.5%  Consistent with diabetes  Currently, no consensus exists for use of hemoglobin A1c  for diagnosis of diabetes for children.  This Hemoglobin A1c assay has significant interference with fetal   hemoglobin   (HbF). The results are invalid for patients with abnormal amounts of   HbF,   including those with known Hereditary Persistence   of Fetal Hemoglobin. Heterozygous hemoglobin variants (HbAS, HbAC,   HbAD, HbAE, HbA2) do not significantly interfere with this assay;   however, presence  of multiple variants in a sample may impact the %   interference.         TSH        The ASCVD Risk score (Rubenabilio CLEMENT Jr., et al., 2013) failed to calculate for the following reasons:    Cannot find a previous HDL lab    Cannot find a previous total cholesterol lab           Cardiovascular Testing:    EKG: (personally reviewed tracing)  Normal sinus rhythm, low-voltage QRS      ASSESSMENT AND PLAN     Patient Active Problem List   Diagnosis    H/O albinism    Acute upper GI bleed    Anemia due to acute blood loss    Acute gastric ulcer with hemorrhage    Gastroesophageal reflux disease without esophagitis    Chest pain    Palpitations         Chest pain, dyspnea on exertion:    Normal left ventricle systolic function.  No ischemia seen on stress testing.  Reassurance given.    Fatigue  /Palpitations:  Check TSH    Palpitations:   Symptoms did not correspond to any arrhythmia.  Does have frequent PACs.  Talked about a therapeutic trial of Toprol-XL.  25 mg daily of Toprol-XL has prescribed.  Told patient that if she starts feeling worse or hypotensive or dizzy, to stop taking the Toprol-XL.      Anxiety:  Referral made to psychiatry.        Thank you very much for involving me in the care of your patient.  Please do not hesitate to contact me if there are any questions.      Jessica Tomas MD, FACC, [facility]  Interventional Cardiologist, [facility].     Follow-up   In 3 months      This note was dictated with the help of speech recognition software.  There might be un-intended errors and/or substitutions.

## 2019-11-11 ENCOUNTER — ANESTHESIA (OUTPATIENT)
Dept: ENDOSCOPY | Facility: HOSPITAL | Age: 44
End: 2019-11-11
Payer: MEDICARE

## 2019-11-11 ENCOUNTER — ANESTHESIA EVENT (OUTPATIENT)
Dept: ENDOSCOPY | Facility: HOSPITAL | Age: 44
End: 2019-11-11
Payer: MEDICARE

## 2019-11-11 ENCOUNTER — HOSPITAL ENCOUNTER (OUTPATIENT)
Facility: HOSPITAL | Age: 44
Discharge: HOME | End: 2019-11-11
Attending: INTERNAL MEDICINE | Admitting: INTERNAL MEDICINE
Payer: MEDICARE

## 2019-11-11 VITALS
HEIGHT: 63 IN | DIASTOLIC BLOOD PRESSURE: 74 MMHG | HEART RATE: 70 BPM | OXYGEN SATURATION: 99 % | SYSTOLIC BLOOD PRESSURE: 121 MMHG | RESPIRATION RATE: 20 BRPM | WEIGHT: 170 LBS | TEMPERATURE: 98 F | BODY MASS INDEX: 30.12 KG/M2

## 2019-11-11 DIAGNOSIS — K25.0 ACUTE GASTRIC ULCER WITH HEMORRHAGE: Primary | ICD-10-CM

## 2019-11-11 DIAGNOSIS — Z98.890 HISTORY OF ESOPHAGOGASTRODUODENOSCOPY (EGD): ICD-10-CM

## 2019-11-11 LAB — B-HCG UR QL: NEGATIVE

## 2019-11-11 PROCEDURE — 43235 EGD DIAGNOSTIC BRUSH WASH: CPT | Mod: ,,, | Performed by: INTERNAL MEDICINE

## 2019-11-11 PROCEDURE — D9220A PRA ANESTHESIA: ICD-10-PCS | Mod: ANES,,, | Performed by: ANESTHESIOLOGY

## 2019-11-11 PROCEDURE — D9220A PRA ANESTHESIA: ICD-10-PCS | Mod: CRNA,,, | Performed by: NURSE ANESTHETIST, CERTIFIED REGISTERED

## 2019-11-11 PROCEDURE — 43235 PR EGD, FLEX, DIAGNOSTIC: ICD-10-PCS | Mod: ,,, | Performed by: INTERNAL MEDICINE

## 2019-11-11 PROCEDURE — 81025 URINE PREGNANCY TEST: CPT

## 2019-11-11 PROCEDURE — 63600175 PHARM REV CODE 636 W HCPCS: Performed by: NURSE ANESTHETIST, CERTIFIED REGISTERED

## 2019-11-11 PROCEDURE — 43235 EGD DIAGNOSTIC BRUSH WASH: CPT | Performed by: INTERNAL MEDICINE

## 2019-11-11 PROCEDURE — 37000009 HC ANESTHESIA EA ADD 15 MINS: Performed by: INTERNAL MEDICINE

## 2019-11-11 PROCEDURE — 37000008 HC ANESTHESIA 1ST 15 MINUTES: Performed by: INTERNAL MEDICINE

## 2019-11-11 PROCEDURE — D9220A PRA ANESTHESIA: Mod: ANES,,, | Performed by: ANESTHESIOLOGY

## 2019-11-11 PROCEDURE — D9220A PRA ANESTHESIA: Mod: CRNA,,, | Performed by: NURSE ANESTHETIST, CERTIFIED REGISTERED

## 2019-11-11 RX ORDER — SODIUM CHLORIDE 0.9 G/100ML
3000 IRRIGANT IRRIGATION CONTINUOUS
Status: DISCONTINUED | OUTPATIENT
Start: 2019-11-11 | End: 2019-11-11 | Stop reason: HOSPADM

## 2019-11-11 RX ORDER — SODIUM CHLORIDE 9 MG/ML
INJECTION, SOLUTION INTRAVENOUS CONTINUOUS PRN
Status: DISCONTINUED | OUTPATIENT
Start: 2019-11-11 | End: 2019-11-11

## 2019-11-11 RX ORDER — PROPOFOL 10 MG/ML
VIAL (ML) INTRAVENOUS
Status: COMPLETED
Start: 2019-11-11 | End: 2019-11-11

## 2019-11-11 RX ORDER — LIDOCAINE HCL/PF 100 MG/5ML
SYRINGE (ML) INTRAVENOUS
Status: DISCONTINUED | OUTPATIENT
Start: 2019-11-11 | End: 2019-11-11

## 2019-11-11 RX ORDER — LIDOCAINE HYDROCHLORIDE 20 MG/ML
INJECTION, SOLUTION INFILTRATION; PERINEURAL
Status: DISCONTINUED
Start: 2019-11-11 | End: 2019-11-11 | Stop reason: HOSPADM

## 2019-11-11 RX ORDER — PROPOFOL 10 MG/ML
INJECTION, EMULSION INTRAVENOUS
Status: DISCONTINUED | OUTPATIENT
Start: 2019-11-11 | End: 2019-11-11

## 2019-11-11 RX ADMIN — PROPOFOL 40 MG: 10 INJECTION, EMULSION INTRAVENOUS at 07:11

## 2019-11-11 RX ADMIN — PROPOFOL 60 MG: 10 INJECTION, EMULSION INTRAVENOUS at 07:11

## 2019-11-11 RX ADMIN — SODIUM CHLORIDE: 0.9 INJECTION, SOLUTION INTRAVENOUS at 07:11

## 2019-11-11 RX ADMIN — LIDOCAINE HYDROCHLORIDE 100 MG: 20 INJECTION, SOLUTION INTRAVENOUS at 07:11

## 2019-11-11 NOTE — H&P
Endoscopy Pre-Procedure H&P    Reason for Procedure: abdominal pain    HPI:  Pt is a 44 y.o. female who presents for EGD to evaluate epigastric pain and some nausea.  No other GI symptoms.    Past Medical History:   Diagnosis Date    H/O albinism     Ulcer of abdomen wall     Vision impairment     blindness/albinism        Past Surgical History:   Procedure Laterality Date    benign ovarian cyst      TONSILLECTOMY         Family History   Problem Relation Age of Onset    Fibrocystic breast disease Mother     Hyperlipidemia Mother     Colon polyps Mother     Hyperlipidemia Father     Polycythemia Maternal Aunt     Heart disease Maternal Grandfather     Hypertension Maternal Grandfather     Heart disease Paternal Grandfather     Hypertension Paternal Grandfather     Colon polyps Paternal Grandmother     Celiac disease Neg Hx     Colon cancer Neg Hx     Crohn's disease Neg Hx     Cystic fibrosis Neg Hx     Esophageal cancer Neg Hx     Irritable bowel syndrome Neg Hx     Inflammatory bowel disease Neg Hx     Liver cancer Neg Hx     Liver disease Neg Hx     Rectal cancer Neg Hx     Stomach cancer Neg Hx     Ulcerative colitis Neg Hx        Social History     Socioeconomic History    Marital status:      Spouse name: Not on file    Number of children: Not on file    Years of education: Not on file    Highest education level: Not on file   Occupational History    Not on file   Social Needs    Financial resource strain: Not on file    Food insecurity:     Worry: Not on file     Inability: Not on file    Transportation needs:     Medical: Not on file     Non-medical: Not on file   Tobacco Use    Smoking status: Never Smoker    Smokeless tobacco: Never Used   Substance and Sexual Activity    Alcohol use: Yes     Comment: Socially, 6 beers weekly    Drug use: No    Sexual activity: Not on file   Lifestyle    Physical activity:     Days per week: Not on file     Minutes per  "session: Not on file    Stress: Not on file   Relationships    Social connections:     Talks on phone: Not on file     Gets together: Not on file     Attends Mosque service: Not on file     Active member of club or organization: Not on file     Attends meetings of clubs or organizations: Not on file     Relationship status: Not on file   Other Topics Concern    Not on file   Social History Narrative    Not on file       Review of patient's allergies indicates:  No Known Allergies    No current facility-administered medications on file prior to encounter.      Current Outpatient Medications on File Prior to Encounter   Medication Sig Dispense Refill    ferrous sulfate 325 mg (65 mg iron) Tab tablet Take 1 tablet (325 mg total) by mouth daily with breakfast. (Patient not taking: Reported on 10/28/2019) 90 tablet 3    methylPREDNISolone (MEDROL DOSEPACK) 4 mg tablet use as directed (Patient not taking: Reported on 10/28/2019) 1 Package 0    ondansetron (ZOFRAN) 4 MG tablet Take 1 tablet (4 mg total) by mouth every 8 (eight) hours as needed for Nausea. (Patient not taking: Reported on 10/28/2019) 12 tablet 0    ondansetron (ZOFRAN-ODT) 8 MG TbDL Take 1 tablet (8 mg total) by mouth every 8 (eight) hours as needed. (Patient not taking: Reported on 10/28/2019) 30 tablet 0       ROS: Negative x 10    Patient Vitals for the past 24 hrs:   BP Temp Pulse Resp SpO2 Height Weight   11/11/19 0709 -- 98.7 °F (37.1 °C) -- -- -- 5' 3" (1.6 m) 77.1 kg (170 lb)   11/11/19 0656 129/70 -- 75 20 100 % -- --     Gen: Well developed, well nourished, no apparent distress  HEENT: Anicteric, PERRL, MMM  CV: Regular rate and rhythm, no murmurs   Lungs: CTAB, no increased work of breathing  Abd: Soft, NT, ND, normal BS, no HSM  Ext: No C/C/E  Neuro: Alert and oriented to person, place, time; no weakness  Skin: No rashes/lesions  Psych: Normal mood and affect    Assessment:  Jody Loza is a 44 y.o. female who presents for EGD to " evaluate epigastric pain.    Recommendations:  1. EGD  2. F/U with PCP     Sue Munoz MD

## 2019-11-11 NOTE — TRANSFER OF CARE
"Anesthesia Transfer of Care Note    Patient: Jody Loza    Procedure(s) Performed: Procedure(s) (LRB):  ESOPHAGOGASTRODUODENOSCOPY (EGD) (N/A)    Patient location: GI    Anesthesia Type: MAC    Transport from OR: Transported from OR on room air with adequate spontaneous ventilation    Post pain: adequate analgesia    Post assessment: no apparent anesthetic complications and tolerated procedure well    Post vital signs: stable    Level of consciousness: awake, alert and oriented    Nausea/Vomiting: no nausea/vomiting    Complications: none    Transfer of care protocol was followed      Last vitals:   Visit Vitals  BP (!) 118/59 (BP Location: Left arm, Patient Position: Lying)   Pulse 75   Temp 36.4 °C (97.5 °F) (Oral)   Resp 12   Ht 5' 3" (1.6 m)   Wt 77.1 kg (170 lb)   SpO2 100%   BMI 30.11 kg/m²     "

## 2019-11-11 NOTE — ANESTHESIA POSTPROCEDURE EVALUATION
Anesthesia Post Evaluation    Patient: Jody Loza    Procedure(s) Performed: Procedure(s) (LRB):  ESOPHAGOGASTRODUODENOSCOPY (EGD) (N/A)    Final Anesthesia Type: MAC  Patient location during evaluation: GI PACU  Patient participation: Yes- Able to Participate  Level of consciousness: awake and alert  Post-procedure vital signs: reviewed and stable  Pain management: adequate  Airway patency: patent  PONV status at discharge: No PONV  Anesthetic complications: no      Cardiovascular status: blood pressure returned to baseline  Respiratory status: spontaneous ventilation  Hydration status: euvolemic  Follow-up not needed.          Vitals Value Taken Time   /74 11/11/2019  8:24 AM   Temp 36.4 °C (97.5 °F) 11/11/2019  7:54 AM   Pulse 70 11/11/2019  8:24 AM   Resp 20 11/11/2019  8:24 AM   SpO2 99 % 11/11/2019  8:24 AM         Event Time     Out of Recovery 08:29:11          Pain/Dragan Score: Dragan Score: 10 (11/11/2019  8:24 AM)  Modified Dragan Score: 20 (11/11/2019  8:24 AM)

## 2019-11-11 NOTE — [employer] PATIENT INSTRUCTIONS
Discharge Summary/Instructions after an Endoscopic Procedure  Patient Name: Jody Loza  Patient MRN: [id number]  Patient YOB: 1975 Monday, November 11, 2019  Sue Munoz MD  RESTRICTIONS:  During your procedure today, you received medications for sedation.  These   medications may affect your judgment, balance and coordination.  Therefore,   for 24 hours, you have the following restrictions:   - DO NOT drive a car, operate machinery, make legal/financial decisions,   sign important papers or drink alcohol.    ACTIVITY:  Today: no heavy lifting, straining or running due to procedural   sedation/anesthesia.  The following day: return to full activity including work.  DIET:  Eat and drink normally unless instructed otherwise.     TREATMENT FOR COMMON SIDE EFFECTS:  - Mild abdominal pain, nausea, belching, bloating or excessive gas:  rest,   eat lightly and use a heating pad.  - Sore Throat: treat with throat lozenges and/or gargle with warm salt   water.  - Because air was used during the procedure, expelling large amounts of air   from your rectum or belching is normal.  - If a bowel prep was taken, you may not have a bowel movement for 1-3 days.    This is normal.  SYMPTOMS TO WATCH FOR AND REPORT TO YOUR PHYSICIAN:  1. Abdominal pain or bloating, other than gas cramps.  2. Chest pain.  3. Back pain.  4. Signs of infection such as: chills or fever occurring within 24 hours   after the procedure.  5. Rectal bleeding, which would show as bright red, maroon, or black stools.   (A tablespoon of blood from the rectum is not serious, especially if   hemorrhoids are present.)  6. Vomiting.  7. Weakness or dizziness.  GO DIRECTLY TO THE NEAREST EMERGENCY ROOM IF YOU HAVE ANY OF THE FOLLOWING:      Difficulty breathing              Chills and/or fever over 101 F   Persistent vomiting and/or vomiting blood   Severe abdominal pain   Severe chest pain   Black, tarry stools   Bleeding- more than one  tablespoon   Any other symptom or condition that you feel may need urgent attention  Your doctor recommends these additional instructions:  If any biopsies were taken, your doctors clinic will contact you in 1 to 2   weeks with any results.  - Patient has a contact number available for emergencies.  The signs and   symptoms of potential delayed complications were discussed with the   patient.  Return to normal activities tomorrow.  Written discharge   instructions were provided to the patient.   - Discharge patient to home.   - Resume previous diet today.   - Continue present medications.   - Trial of FDGard - over the counter.  - The findings and recommendations were discussed with the patient.  For questions, problems or results please call your physician - Sue Munoz MD at Work:  [phone].  [facility] can be reached at [phone]     IF A COMPLICATION OR EMERGENCY SITUATION ARISES AND YOU ARE UNABLE TO REACH   YOUR PHYSICIAN - GO DIRECTLY TO THE EMERGENCY ROOM.  Sue Munoz MD  11/11/2019 7:54:16 AM  This report has been verified and signed electronically.  [employer]

## 2019-11-11 NOTE — ANESTHESIA PREPROCEDURE EVALUATION
11/11/2019  Jody Loza is a 44 y.o., female.  Pre-operative evaluation for Procedure(s) (LRB):  ESOPHAGOGASTRODUODENOSCOPY (EGD) (N/A)    NPO >8h  METS 4-6  Denies recent URI/CP/SOB  Cardiology recently told her she has PAT.    Patient Active Problem List   Diagnosis    H/O albinism    Acute upper GI bleed    Anemia due to acute blood loss    Acute gastric ulcer with hemorrhage    Gastroesophageal reflux disease without esophagitis    Chest pain    Palpitations    History of esophagogastroduodenoscopy (EGD)       Review of patient's allergies indicates:  No Known Allergies    No current facility-administered medications on file prior to encounter.      Current Outpatient Medications on File Prior to Encounter   Medication Sig Dispense Refill    ferrous sulfate 325 mg (65 mg iron) Tab tablet Take 1 tablet (325 mg total) by mouth daily with breakfast. (Patient not taking: Reported on 10/28/2019) 90 tablet 3    methylPREDNISolone (MEDROL DOSEPACK) 4 mg tablet use as directed (Patient not taking: Reported on 10/28/2019) 1 Package 0    ondansetron (ZOFRAN) 4 MG tablet Take 1 tablet (4 mg total) by mouth every 8 (eight) hours as needed for Nausea. (Patient not taking: Reported on 10/28/2019) 12 tablet 0    ondansetron (ZOFRAN-ODT) 8 MG TbDL Take 1 tablet (8 mg total) by mouth every 8 (eight) hours as needed. (Patient not taking: Reported on 10/28/2019) 30 tablet 0       Past Surgical History:   Procedure Laterality Date    benign ovarian cyst      TONSILLECTOMY         Social History     Socioeconomic History    Marital status:      Spouse name: Not on file    Number of children: Not on file    Years of education: Not on file    Highest education level: Not on file   Occupational History    Not on file   Social Needs    Financial resource strain: Not on file    Food insecurity:      Worry: Not on file     Inability: Not on file    Transportation needs:     Medical: Not on file     Non-medical: Not on file   Tobacco Use    Smoking status: Never Smoker    Smokeless tobacco: Never Used   Substance and Sexual Activity    Alcohol use: Yes     Comment: Socially, 6 beers weekly    Drug use: No    Sexual activity: Not on file   Lifestyle    Physical activity:     Days per week: Not on file     Minutes per session: Not on file    Stress: Not on file   Relationships    Social connections:     Talks on phone: Not on file     Gets together: Not on file     Attends Islam service: Not on file     Active member of club or organization: Not on file     Attends meetings of clubs or organizations: Not on file     Relationship status: Not on file   Other Topics Concern    Not on file   Social History Narrative    Not on file         CBC & CMP from 10/8/19 reviewed and wnl.    INR  No results for input(s): PT, INR, PROTIME, APTT in the last 72 hours.        Diagnostic Studies:      EKG: NSR, occ PVC      2D Echo:  · ECHO 10/23/19:  Normal left ventricular systolic function. The estimated ejection fraction is 65%  · Normal LV diastolic function.  · Normal right ventricular systolic function.  · Mild left atrial enlargement.  · Normal central venous pressure (3 mm Hg).  · The estimated PA systolic pressure is 21 mm Hg  ·   Stress test 10/23/19:   The perfusion scan is free of evidence from myocardial ischemia or injury.    There is a mild to moderate intensity defect in the apical wall of the left ventricle, secondary to breast attenuation.    Gated perfusion images showed an ejection fraction of 64.0 %    Wall Motion is physiologic at stress and physiologic at rest.    The EKG portion of this study is negative for ischemia.    The patient reported chest pain during the stress test.    Arrhythmias during stress: rare PVCs.    Anesthesia Evaluation    I have reviewed the Patient Summary Reports.     I have reviewed the Nursing Notes.   I have reviewed the Medications.     Review of Systems  Anesthesia Hx:  No problems with previous Anesthesia  History of prior surgery of interest to airway management or planning: Denies Family Hx of Anesthesia complications.   Denies Personal Hx of Anesthesia complications.   Social:  Non-Smoker, Social Alcohol Use    Hematology/Oncology:  Hematology Normal   Oncology Normal     EENT/Dental:EENT/Dental Normal   Cardiovascular:   Exercise tolerance: good Dysrhythmias ECG has been reviewed.    Pulmonary:  Pulmonary Normal    Hepatic/GI:   PUD, GERD, well controlled    Musculoskeletal:  Musculoskeletal Normal    Neurological:  Neurology Normal    Endocrine:  Endocrine Normal    Psych:  Psychiatric Normal           Physical Exam  General:  Obesity    Airway/Jaw/Neck:  Airway Findings: Mouth Opening: Small, but > 3cm Tongue: Normal  General Airway Assessment: Adult  Mallampati: IV  TM Distance: 4 - 6 cm        Eyes/Ears/Nose:  EYES/EARS/NOSE FINDINGS: Normal   Dental:  Dental Findings: In tact, Periodontal disease, Mild        Mental Status:  Mental Status Findings: Normal        Anesthesia Plan  Type of Anesthesia, risks & benefits discussed:  Anesthesia Type:  general, MAC  Patient's Preference:   Intra-op Monitoring Plan:   Intra-op Monitoring Plan Comments:   Post Op Pain Control Plan: multimodal analgesia  Post Op Pain Control Plan Comments:   Induction:   IV  Beta Blocker:  Patient is on a Beta-Blocker and has received one dose within the past 24 hours (No further documentation required).       Informed Consent: Patient understands risks and agrees with Anesthesia plan.  Questions answered. Anesthesia consent signed with patient.  ASA Score: 2     Day of Surgery Review of History & Physical:  There are no significant changes.          Ready For Surgery From Anesthesia Perspective.

## 2020-08-14 DIAGNOSIS — Z11.59 NEED FOR HEPATITIS C SCREENING TEST: ICD-10-CM

## 2020-10-05 ENCOUNTER — PATIENT MESSAGE (OUTPATIENT)
Dept: ADMINISTRATIVE | Facility: HOSPITAL | Age: 45
End: 2020-10-05

## 2021-01-04 ENCOUNTER — PATIENT MESSAGE (OUTPATIENT)
Dept: ADMINISTRATIVE | Facility: HOSPITAL | Age: 46
End: 2021-01-04

## 2021-02-22 ENCOUNTER — OFFICE VISIT (OUTPATIENT)
Dept: FAMILY MEDICINE | Facility: CLINIC | Age: 46
End: 2021-02-22
Payer: MEDICARE

## 2021-02-22 VITALS
HEIGHT: 63 IN | TEMPERATURE: 98 F | SYSTOLIC BLOOD PRESSURE: 108 MMHG | BODY MASS INDEX: 31.33 KG/M2 | DIASTOLIC BLOOD PRESSURE: 76 MMHG | OXYGEN SATURATION: 97 % | RESPIRATION RATE: 18 BRPM | HEART RATE: 81 BPM | WEIGHT: 176.81 LBS

## 2021-02-22 DIAGNOSIS — Z11.59 NEED FOR HEPATITIS C SCREENING TEST: ICD-10-CM

## 2021-02-22 DIAGNOSIS — Z01.419 ENCOUNTER FOR GYNECOLOGICAL EXAMINATION: ICD-10-CM

## 2021-02-22 DIAGNOSIS — R42 DIZZINESS: ICD-10-CM

## 2021-02-22 DIAGNOSIS — Z01.84 IMMUNITY STATUS TESTING: ICD-10-CM

## 2021-02-22 DIAGNOSIS — R53.83 FATIGUE, UNSPECIFIED TYPE: ICD-10-CM

## 2021-02-22 DIAGNOSIS — Z13.220 ENCOUNTER FOR LIPID SCREENING FOR CARDIOVASCULAR DISEASE: ICD-10-CM

## 2021-02-22 DIAGNOSIS — G44.52 NEW DAILY PERSISTENT HEADACHE: ICD-10-CM

## 2021-02-22 DIAGNOSIS — Z12.31 ENCOUNTER FOR SCREENING MAMMOGRAM FOR BREAST CANCER: ICD-10-CM

## 2021-02-22 DIAGNOSIS — Z13.6 ENCOUNTER FOR LIPID SCREENING FOR CARDIOVASCULAR DISEASE: ICD-10-CM

## 2021-02-22 DIAGNOSIS — Z00.00 ROUTINE CHECK-UP: Primary | ICD-10-CM

## 2021-02-22 DIAGNOSIS — H61.21 IMPACTED CERUMEN OF RIGHT EAR: ICD-10-CM

## 2021-02-22 DIAGNOSIS — Z11.4 ENCOUNTER FOR SCREENING FOR HIV: ICD-10-CM

## 2021-02-22 PROCEDURE — 3008F BODY MASS INDEX DOCD: CPT | Mod: CPTII,S$GLB,, | Performed by: FAMILY MEDICINE

## 2021-02-22 PROCEDURE — 1126F PR PAIN SEVERITY QUANTIFIED, NO PAIN PRESENT: ICD-10-PCS | Mod: S$GLB,,, | Performed by: FAMILY MEDICINE

## 2021-02-22 PROCEDURE — 99999 PR PBB SHADOW E&M-EST. PATIENT-LVL V: CPT | Mod: PBBFAC,,, | Performed by: FAMILY MEDICINE

## 2021-02-22 PROCEDURE — 99999 PR PBB SHADOW E&M-EST. PATIENT-LVL V: ICD-10-PCS | Mod: PBBFAC,,, | Performed by: FAMILY MEDICINE

## 2021-02-22 PROCEDURE — 69209 EAR CERUMEN REMOVAL: ICD-10-PCS | Mod: RT,S$GLB,, | Performed by: FAMILY MEDICINE

## 2021-02-22 PROCEDURE — 3008F PR BODY MASS INDEX (BMI) DOCUMENTED: ICD-10-PCS | Mod: CPTII,S$GLB,, | Performed by: FAMILY MEDICINE

## 2021-02-22 PROCEDURE — 99213 OFFICE O/P EST LOW 20 MIN: CPT | Mod: 25,S$GLB,, | Performed by: FAMILY MEDICINE

## 2021-02-22 PROCEDURE — 99213 PR OFFICE/OUTPT VISIT, EST, LEVL III, 20-29 MIN: ICD-10-PCS | Mod: 25,S$GLB,, | Performed by: FAMILY MEDICINE

## 2021-02-22 PROCEDURE — 1126F AMNT PAIN NOTED NONE PRSNT: CPT | Mod: S$GLB,,, | Performed by: FAMILY MEDICINE

## 2021-02-22 PROCEDURE — 69209 REMOVE IMPACTED EAR WAX UNI: CPT | Mod: RT,S$GLB,, | Performed by: FAMILY MEDICINE

## 2021-02-22 RX ORDER — MECLIZINE HYDROCHLORIDE 25 MG/1
25 TABLET ORAL 3 TIMES DAILY PRN
Qty: 60 TABLET | Refills: 1 | Status: SHIPPED | OUTPATIENT
Start: 2021-02-22

## 2021-02-26 ENCOUNTER — HOSPITAL ENCOUNTER (OUTPATIENT)
Dept: RADIOLOGY | Facility: HOSPITAL | Age: 46
Discharge: HOME | End: 2021-02-26
Attending: FAMILY MEDICINE
Payer: MEDICARE

## 2021-02-26 DIAGNOSIS — R42 DIZZINESS: ICD-10-CM

## 2021-02-26 DIAGNOSIS — G44.52 NEW DAILY PERSISTENT HEADACHE: ICD-10-CM

## 2021-02-26 DIAGNOSIS — Z12.31 ENCOUNTER FOR SCREENING MAMMOGRAM FOR BREAST CANCER: ICD-10-CM

## 2021-02-26 PROCEDURE — 77067 MAMMO DIGITAL SCREENING BILAT WITH TOMO: ICD-10-PCS | Mod: 26,,, | Performed by: RADIOLOGY

## 2021-02-26 PROCEDURE — 70450 CT HEAD WITHOUT CONTRAST: ICD-10-PCS | Mod: 26,,, | Performed by: RADIOLOGY

## 2021-02-26 PROCEDURE — 77067 SCR MAMMO BI INCL CAD: CPT | Mod: 26,,, | Performed by: RADIOLOGY

## 2021-02-26 PROCEDURE — 77063 BREAST TOMOSYNTHESIS BI: CPT | Mod: 26,,, | Performed by: RADIOLOGY

## 2021-02-26 PROCEDURE — 70450 CT HEAD/BRAIN W/O DYE: CPT | Mod: 26,,, | Performed by: RADIOLOGY

## 2021-02-26 PROCEDURE — 77067 SCR MAMMO BI INCL CAD: CPT | Mod: TC

## 2021-02-26 PROCEDURE — 77063 MAMMO DIGITAL SCREENING BILAT WITH TOMO: ICD-10-PCS | Mod: 26,,, | Performed by: RADIOLOGY

## 2021-02-26 PROCEDURE — 70450 CT HEAD/BRAIN W/O DYE: CPT | Mod: TC

## 2021-03-08 ENCOUNTER — PATIENT OUTREACH (OUTPATIENT)
Dept: ADMINISTRATIVE | Facility: OTHER | Age: 46
End: 2021-03-08

## 2021-03-08 ENCOUNTER — OFFICE VISIT (OUTPATIENT)
Dept: FAMILY MEDICINE | Facility: CLINIC | Age: 46
End: 2021-03-08
Payer: MEDICARE

## 2021-03-08 VITALS
OXYGEN SATURATION: 98 % | TEMPERATURE: 98 F | DIASTOLIC BLOOD PRESSURE: 64 MMHG | WEIGHT: 179.69 LBS | BODY MASS INDEX: 31.84 KG/M2 | HEIGHT: 63 IN | HEART RATE: 78 BPM | SYSTOLIC BLOOD PRESSURE: 111 MMHG | RESPIRATION RATE: 18 BRPM

## 2021-03-08 DIAGNOSIS — R53.83 FATIGUE, UNSPECIFIED TYPE: ICD-10-CM

## 2021-03-08 DIAGNOSIS — H55.00 NYSTAGMUS: ICD-10-CM

## 2021-03-08 DIAGNOSIS — R42 DIZZINESS: Primary | ICD-10-CM

## 2021-03-08 DIAGNOSIS — E70.30: ICD-10-CM

## 2021-03-08 PROCEDURE — 99999 PR PBB SHADOW E&M-EST. PATIENT-LVL IV: ICD-10-PCS | Mod: PBBFAC,,, | Performed by: FAMILY MEDICINE

## 2021-03-08 PROCEDURE — 99214 OFFICE O/P EST MOD 30 MIN: CPT | Mod: S$GLB,,, | Performed by: FAMILY MEDICINE

## 2021-03-08 PROCEDURE — 99499 UNLISTED E&M SERVICE: CPT | Mod: S$GLB,,, | Performed by: FAMILY MEDICINE

## 2021-03-08 PROCEDURE — 99999 PR PBB SHADOW E&M-EST. PATIENT-LVL IV: CPT | Mod: PBBFAC,,, | Performed by: FAMILY MEDICINE

## 2021-03-08 PROCEDURE — 1126F PR PAIN SEVERITY QUANTIFIED, NO PAIN PRESENT: ICD-10-PCS | Mod: S$GLB,,, | Performed by: FAMILY MEDICINE

## 2021-03-08 PROCEDURE — 99499 RISK ADDL DX/OHS AUDIT: ICD-10-PCS | Mod: S$GLB,,, | Performed by: FAMILY MEDICINE

## 2021-03-08 PROCEDURE — 3008F PR BODY MASS INDEX (BMI) DOCUMENTED: ICD-10-PCS | Mod: CPTII,S$GLB,, | Performed by: FAMILY MEDICINE

## 2021-03-08 PROCEDURE — 99214 PR OFFICE/OUTPT VISIT, EST, LEVL IV, 30-39 MIN: ICD-10-PCS | Mod: S$GLB,,, | Performed by: FAMILY MEDICINE

## 2021-03-08 PROCEDURE — 3008F BODY MASS INDEX DOCD: CPT | Mod: CPTII,S$GLB,, | Performed by: FAMILY MEDICINE

## 2021-03-08 PROCEDURE — 1126F AMNT PAIN NOTED NONE PRSNT: CPT | Mod: S$GLB,,, | Performed by: FAMILY MEDICINE

## 2021-03-09 ENCOUNTER — TELEPHONE (OUTPATIENT)
Dept: SLEEP MEDICINE | Facility: CLINIC | Age: 46
End: 2021-03-09

## 2021-03-10 ENCOUNTER — PATIENT MESSAGE (OUTPATIENT)
Dept: AUDIOLOGY | Facility: CLINIC | Age: 46
End: 2021-03-10

## 2021-03-10 ENCOUNTER — TELEPHONE (OUTPATIENT)
Dept: AUDIOLOGY | Facility: CLINIC | Age: 46
End: 2021-03-10

## 2021-03-11 ENCOUNTER — PATIENT MESSAGE (OUTPATIENT)
Dept: OTOLARYNGOLOGY | Facility: CLINIC | Age: 46
End: 2021-03-11

## 2021-03-11 ENCOUNTER — OFFICE VISIT (OUTPATIENT)
Dept: OTOLARYNGOLOGY | Facility: CLINIC | Age: 46
End: 2021-03-11
Payer: MEDICARE

## 2021-03-11 VITALS
DIASTOLIC BLOOD PRESSURE: 80 MMHG | HEIGHT: 63 IN | WEIGHT: 177.13 LBS | SYSTOLIC BLOOD PRESSURE: 116 MMHG | BODY MASS INDEX: 31.38 KG/M2 | TEMPERATURE: 98 F

## 2021-03-11 DIAGNOSIS — R42 DIZZINESS: ICD-10-CM

## 2021-03-11 PROCEDURE — 1125F PR PAIN SEVERITY QUANTIFIED, PAIN PRESENT: ICD-10-PCS | Mod: S$GLB,,, | Performed by: OTOLARYNGOLOGY

## 2021-03-11 PROCEDURE — 99203 OFFICE O/P NEW LOW 30 MIN: CPT | Mod: S$GLB,,, | Performed by: OTOLARYNGOLOGY

## 2021-03-11 PROCEDURE — 99203 PR OFFICE/OUTPT VISIT, NEW, LEVL III, 30-44 MIN: ICD-10-PCS | Mod: S$GLB,,, | Performed by: OTOLARYNGOLOGY

## 2021-03-11 PROCEDURE — 3008F PR BODY MASS INDEX (BMI) DOCUMENTED: ICD-10-PCS | Mod: CPTII,S$GLB,, | Performed by: OTOLARYNGOLOGY

## 2021-03-11 PROCEDURE — 3008F BODY MASS INDEX DOCD: CPT | Mod: CPTII,S$GLB,, | Performed by: OTOLARYNGOLOGY

## 2021-03-11 PROCEDURE — 1125F AMNT PAIN NOTED PAIN PRSNT: CPT | Mod: S$GLB,,, | Performed by: OTOLARYNGOLOGY

## 2021-04-06 ENCOUNTER — PATIENT MESSAGE (OUTPATIENT)
Dept: ADMINISTRATIVE | Facility: HOSPITAL | Age: 46
End: 2021-04-06

## 2021-04-16 ENCOUNTER — PATIENT MESSAGE (OUTPATIENT)
Dept: RESEARCH | Facility: HOSPITAL | Age: 46
End: 2021-04-16

## 2021-07-06 ENCOUNTER — PATIENT MESSAGE (OUTPATIENT)
Dept: ADMINISTRATIVE | Facility: HOSPITAL | Age: 46
End: 2021-07-06

## 2021-07-09 ENCOUNTER — OFFICE VISIT (OUTPATIENT)
Dept: FAMILY MEDICINE | Facility: CLINIC | Age: 46
End: 2021-07-09
Payer: MEDICARE

## 2021-07-09 VITALS
OXYGEN SATURATION: 97 % | WEIGHT: 170.5 LBS | SYSTOLIC BLOOD PRESSURE: 110 MMHG | BODY MASS INDEX: 30.21 KG/M2 | HEART RATE: 82 BPM | TEMPERATURE: 99 F | DIASTOLIC BLOOD PRESSURE: 60 MMHG

## 2021-07-09 DIAGNOSIS — B02.9 HERPES ZOSTER WITHOUT COMPLICATION: Primary | ICD-10-CM

## 2021-07-09 PROCEDURE — 1125F AMNT PAIN NOTED PAIN PRSNT: CPT | Mod: S$GLB,,, | Performed by: INTERNAL MEDICINE

## 2021-07-09 PROCEDURE — 3008F PR BODY MASS INDEX (BMI) DOCUMENTED: ICD-10-PCS | Mod: CPTII,S$GLB,, | Performed by: INTERNAL MEDICINE

## 2021-07-09 PROCEDURE — 99999 PR PBB SHADOW E&M-EST. PATIENT-LVL III: CPT | Mod: PBBFAC,,, | Performed by: INTERNAL MEDICINE

## 2021-07-09 PROCEDURE — 1125F PR PAIN SEVERITY QUANTIFIED, PAIN PRESENT: ICD-10-PCS | Mod: S$GLB,,, | Performed by: INTERNAL MEDICINE

## 2021-07-09 PROCEDURE — 99213 OFFICE O/P EST LOW 20 MIN: CPT | Mod: S$GLB,,, | Performed by: INTERNAL MEDICINE

## 2021-07-09 PROCEDURE — 99999 PR PBB SHADOW E&M-EST. PATIENT-LVL III: ICD-10-PCS | Mod: PBBFAC,,, | Performed by: INTERNAL MEDICINE

## 2021-07-09 PROCEDURE — 99213 PR OFFICE/OUTPT VISIT, EST, LEVL III, 20-29 MIN: ICD-10-PCS | Mod: S$GLB,,, | Performed by: INTERNAL MEDICINE

## 2021-07-09 PROCEDURE — 3008F BODY MASS INDEX DOCD: CPT | Mod: CPTII,S$GLB,, | Performed by: INTERNAL MEDICINE

## 2021-07-09 RX ORDER — GABAPENTIN 300 MG/1
300 CAPSULE ORAL 3 TIMES DAILY
Qty: 90 CAPSULE | Refills: 0 | Status: SHIPPED | OUTPATIENT
Start: 2021-07-09 | End: 2022-07-09

## 2021-07-09 RX ORDER — VALACYCLOVIR HYDROCHLORIDE 1 G/1
1000 TABLET, FILM COATED ORAL 3 TIMES DAILY
Qty: 21 TABLET | Refills: 0 | Status: SHIPPED | OUTPATIENT
Start: 2021-07-09 | End: 2021-07-16

## 2021-10-04 ENCOUNTER — PATIENT MESSAGE (OUTPATIENT)
Dept: ADMINISTRATIVE | Facility: HOSPITAL | Age: 46
End: 2021-10-04

## 2021-10-15 ENCOUNTER — PES CALL (OUTPATIENT)
Dept: ADMINISTRATIVE | Facility: CLINIC | Age: 46
End: 2021-10-15

## 2021-10-16 ENCOUNTER — HOSPITAL ENCOUNTER (EMERGENCY)
Facility: HOSPITAL | Age: 46
Discharge: HOME | End: 2021-10-16
Attending: EMERGENCY MEDICINE
Payer: MEDICARE

## 2021-10-16 VITALS
DIASTOLIC BLOOD PRESSURE: 59 MMHG | SYSTOLIC BLOOD PRESSURE: 105 MMHG | BODY MASS INDEX: 30.12 KG/M2 | HEART RATE: 77 BPM | TEMPERATURE: 98 F | RESPIRATION RATE: 20 BRPM | HEIGHT: 63 IN | OXYGEN SATURATION: 100 % | WEIGHT: 170 LBS

## 2021-10-16 DIAGNOSIS — K06.8 BLEEDING GUMS: Primary | ICD-10-CM

## 2021-10-16 LAB
ANION GAP SERPL CALC-SCNC: 11 MMOL/L (ref 8–16)
BASOPHILS # BLD AUTO: 0.01 K/UL (ref 0–0.2)
BASOPHILS NFR BLD: 0.1 % (ref 0–1.9)
BUN SERPL-MCNC: 15 MG/DL (ref 6–20)
CALCIUM SERPL-MCNC: 9.3 MG/DL (ref 8.7–10.5)
CHLORIDE SERPL-SCNC: 106 MMOL/L (ref 95–110)
CO2 SERPL-SCNC: 23 MMOL/L (ref 23–29)
CREAT SERPL-MCNC: 0.9 MG/DL (ref 0.5–1.4)
DIFFERENTIAL METHOD: ABNORMAL
EOSINOPHIL # BLD AUTO: 0 K/UL (ref 0–0.5)
EOSINOPHIL NFR BLD: 0 % (ref 0–8)
ERYTHROCYTE [DISTWIDTH] IN BLOOD BY AUTOMATED COUNT: 11.6 % (ref 11.5–14.5)
EST. GFR  (AFRICAN AMERICAN): >60 ML/MIN/1.73 M^2
EST. GFR  (NON AFRICAN AMERICAN): >60 ML/MIN/1.73 M^2
GLUCOSE SERPL-MCNC: 138 MG/DL (ref 70–110)
HCT VFR BLD AUTO: 33.7 % (ref 37–48.5)
HGB BLD-MCNC: 11.6 G/DL (ref 12–16)
IMM GRANULOCYTES # BLD AUTO: 0.05 K/UL (ref 0–0.04)
IMM GRANULOCYTES NFR BLD AUTO: 0.5 % (ref 0–0.5)
LYMPHOCYTES # BLD AUTO: 1.2 K/UL (ref 1–4.8)
LYMPHOCYTES NFR BLD: 13.3 % (ref 18–48)
MCH RBC QN AUTO: 31.8 PG (ref 27–31)
MCHC RBC AUTO-ENTMCNC: 34.4 G/DL (ref 32–36)
MCV RBC AUTO: 92 FL (ref 82–98)
MONOCYTES # BLD AUTO: 0.5 K/UL (ref 0.3–1)
MONOCYTES NFR BLD: 5.7 % (ref 4–15)
NEUTROPHILS # BLD AUTO: 7.3 K/UL (ref 1.8–7.7)
NEUTROPHILS NFR BLD: 80.4 % (ref 38–73)
NRBC BLD-RTO: 0 /100 WBC
PLATELET # BLD AUTO: 278 K/UL (ref 150–450)
PMV BLD AUTO: 9 FL (ref 9.2–12.9)
POTASSIUM SERPL-SCNC: 3.7 MMOL/L (ref 3.5–5.1)
RBC # BLD AUTO: 3.65 M/UL (ref 4–5.4)
SODIUM SERPL-SCNC: 140 MMOL/L (ref 136–145)
WBC # BLD AUTO: 9.11 K/UL (ref 3.9–12.7)

## 2021-10-16 PROCEDURE — 64400 NJX AA&/STRD TRIGEMINAL NRV: CPT

## 2021-10-16 PROCEDURE — 96374 THER/PROPH/DIAG INJ IV PUSH: CPT | Mod: 59

## 2021-10-16 PROCEDURE — 25000003 PHARM REV CODE 250: Performed by: PHYSICIAN ASSISTANT

## 2021-10-16 PROCEDURE — 85025 COMPLETE CBC W/AUTO DIFF WBC: CPT | Performed by: PHYSICIAN ASSISTANT

## 2021-10-16 PROCEDURE — 96375 TX/PRO/DX INJ NEW DRUG ADDON: CPT

## 2021-10-16 PROCEDURE — 99284 EMERGENCY DEPT VISIT MOD MDM: CPT | Mod: 25

## 2021-10-16 PROCEDURE — 64450 NJX AA&/STRD OTHER PN/BRANCH: CPT

## 2021-10-16 PROCEDURE — 63600175 PHARM REV CODE 636 W HCPCS: Performed by: PHYSICIAN ASSISTANT

## 2021-10-16 PROCEDURE — 80048 BASIC METABOLIC PNL TOTAL CA: CPT | Performed by: PHYSICIAN ASSISTANT

## 2021-10-16 RX ORDER — SILVER NITRATE 38.21; 12.74 MG/1; MG/1
1 STICK TOPICAL
Status: COMPLETED | OUTPATIENT
Start: 2021-10-16 | End: 2021-10-16

## 2021-10-16 RX ORDER — BUPIVACAINE HCL/EPINEPHRINE 0.5-1:200K
3.6 VIAL (ML) INJECTION
Status: COMPLETED | OUTPATIENT
Start: 2021-10-16 | End: 2021-10-16

## 2021-10-16 RX ORDER — ONDANSETRON 2 MG/ML
4 INJECTION INTRAMUSCULAR; INTRAVENOUS
Status: COMPLETED | OUTPATIENT
Start: 2021-10-16 | End: 2021-10-16

## 2021-10-16 RX ORDER — MORPHINE SULFATE 4 MG/ML
2 INJECTION, SOLUTION INTRAMUSCULAR; INTRAVENOUS
Status: COMPLETED | OUTPATIENT
Start: 2021-10-16 | End: 2021-10-16

## 2021-10-16 RX ADMIN — SILVER NITRATE APPLICATORS 1 APPLICATOR: 25; 75 STICK TOPICAL at 02:10

## 2021-10-16 RX ADMIN — ONDANSETRON 4 MG: 2 INJECTION INTRAMUSCULAR; INTRAVENOUS at 02:10

## 2021-10-16 RX ADMIN — SODIUM CHLORIDE 1000 ML: 9 INJECTION, SOLUTION INTRAVENOUS at 03:10

## 2021-10-16 RX ADMIN — MORPHINE SULFATE 2 MG: 4 INJECTION INTRAVENOUS at 03:10

## 2021-10-16 RX ADMIN — BUPIVACAINE HYDROCHLORIDE AND EPINEPHRINE BITARTRATE 3.6 ML: 5; .005 INJECTION, SOLUTION PERINEURAL at 03:10

## 2021-10-19 ENCOUNTER — HOSPITAL ENCOUNTER (EMERGENCY)
Facility: HOSPITAL | Age: 46
Discharge: HOME | End: 2021-10-19
Attending: EMERGENCY MEDICINE
Payer: MEDICARE

## 2021-10-19 VITALS
RESPIRATION RATE: 16 BRPM | SYSTOLIC BLOOD PRESSURE: 105 MMHG | HEART RATE: 83 BPM | DIASTOLIC BLOOD PRESSURE: 60 MMHG | TEMPERATURE: 99 F | WEIGHT: 170 LBS | BODY MASS INDEX: 30.12 KG/M2 | HEIGHT: 63 IN | OXYGEN SATURATION: 100 %

## 2021-10-19 DIAGNOSIS — R53.83 FATIGUE, UNSPECIFIED TYPE: ICD-10-CM

## 2021-10-19 DIAGNOSIS — D64.9 ANEMIA, UNSPECIFIED TYPE: Primary | ICD-10-CM

## 2021-10-19 DIAGNOSIS — R00.2 PALPITATIONS: ICD-10-CM

## 2021-10-19 LAB
ALBUMIN SERPL BCP-MCNC: 4.1 G/DL (ref 3.5–5.2)
ALP SERPL-CCNC: 39 U/L (ref 55–135)
ALT SERPL W/O P-5'-P-CCNC: 16 U/L (ref 10–44)
ANION GAP SERPL CALC-SCNC: 8 MMOL/L (ref 8–16)
AST SERPL-CCNC: 21 U/L (ref 10–40)
B-HCG UR QL: NEGATIVE
BASOPHILS # BLD AUTO: 0.02 K/UL (ref 0–0.2)
BASOPHILS NFR BLD: 0.3 % (ref 0–1.9)
BILIRUB SERPL-MCNC: 0.3 MG/DL (ref 0.1–1)
BILIRUB UR QL STRIP: NEGATIVE
BUN SERPL-MCNC: 12 MG/DL (ref 6–20)
CALCIUM SERPL-MCNC: 9.6 MG/DL (ref 8.7–10.5)
CHLORIDE SERPL-SCNC: 105 MMOL/L (ref 95–110)
CLARITY UR: CLEAR
CO2 SERPL-SCNC: 27 MMOL/L (ref 23–29)
COLOR UR: YELLOW
CREAT SERPL-MCNC: 0.8 MG/DL (ref 0.5–1.4)
CTP QC/QA: YES
D DIMER PPP IA.FEU-MCNC: 0.26 MG/L FEU
DIFFERENTIAL METHOD: ABNORMAL
EOSINOPHIL # BLD AUTO: 0.1 K/UL (ref 0–0.5)
EOSINOPHIL NFR BLD: 0.9 % (ref 0–8)
ERYTHROCYTE [DISTWIDTH] IN BLOOD BY AUTOMATED COUNT: 11.7 % (ref 11.5–14.5)
EST. GFR  (AFRICAN AMERICAN): >60 ML/MIN/1.73 M^2
EST. GFR  (NON AFRICAN AMERICAN): >60 ML/MIN/1.73 M^2
FECAL OCCULT BLOOD, POC: NEGATIVE
GLUCOSE SERPL-MCNC: 91 MG/DL (ref 70–110)
GLUCOSE UR QL STRIP: NEGATIVE
HCT VFR BLD AUTO: 26.3 % (ref 37–48.5)
HGB BLD-MCNC: 9 G/DL (ref 12–16)
HGB UR QL STRIP: NEGATIVE
IMM GRANULOCYTES # BLD AUTO: 0.01 K/UL (ref 0–0.04)
IMM GRANULOCYTES NFR BLD AUTO: 0.1 % (ref 0–0.5)
KETONES UR QL STRIP: NEGATIVE
LEUKOCYTE ESTERASE UR QL STRIP: NEGATIVE
LYMPHOCYTES # BLD AUTO: 2.1 K/UL (ref 1–4.8)
LYMPHOCYTES NFR BLD: 27 % (ref 18–48)
MCH RBC QN AUTO: 31.4 PG (ref 27–31)
MCHC RBC AUTO-ENTMCNC: 34.2 G/DL (ref 32–36)
MCV RBC AUTO: 92 FL (ref 82–98)
MONOCYTES # BLD AUTO: 0.5 K/UL (ref 0.3–1)
MONOCYTES NFR BLD: 5.8 % (ref 4–15)
NEUTROPHILS # BLD AUTO: 5.1 K/UL (ref 1.8–7.7)
NEUTROPHILS NFR BLD: 65.9 % (ref 38–73)
NITRITE UR QL STRIP: NEGATIVE
NRBC BLD-RTO: 0 /100 WBC
PH UR STRIP: 6 [PH] (ref 5–8)
PLATELET # BLD AUTO: 288 K/UL (ref 150–450)
PMV BLD AUTO: 9.2 FL (ref 9.2–12.9)
POTASSIUM SERPL-SCNC: 4.2 MMOL/L (ref 3.5–5.1)
PROT SERPL-MCNC: 7.3 G/DL (ref 6–8.4)
PROT UR QL STRIP: NEGATIVE
RBC # BLD AUTO: 2.87 M/UL (ref 4–5.4)
SARS-COV-2 RDRP RESP QL NAA+PROBE: NEGATIVE
SODIUM SERPL-SCNC: 140 MMOL/L (ref 136–145)
SP GR UR STRIP: 1.01 (ref 1–1.03)
TROPONIN I SERPL DL<=0.01 NG/ML-MCNC: <0.006 NG/ML (ref 0–0.03)
TSH SERPL DL<=0.005 MIU/L-ACNC: 1.7 UIU/ML (ref 0.4–4)
URN SPEC COLLECT METH UR: NORMAL
UROBILINOGEN UR STRIP-ACNC: NEGATIVE EU/DL
WBC # BLD AUTO: 7.7 K/UL (ref 3.9–12.7)

## 2021-10-19 PROCEDURE — 81025 URINE PREGNANCY TEST: CPT | Performed by: NURSE PRACTITIONER

## 2021-10-19 PROCEDURE — 81003 URINALYSIS AUTO W/O SCOPE: CPT | Performed by: NURSE PRACTITIONER

## 2021-10-19 PROCEDURE — 99285 EMERGENCY DEPT VISIT HI MDM: CPT | Mod: 25

## 2021-10-19 PROCEDURE — 93005 ELECTROCARDIOGRAM TRACING: CPT

## 2021-10-19 PROCEDURE — 84484 ASSAY OF TROPONIN QUANT: CPT | Performed by: NURSE PRACTITIONER

## 2021-10-19 PROCEDURE — 84443 ASSAY THYROID STIM HORMONE: CPT | Performed by: NURSE PRACTITIONER

## 2021-10-19 PROCEDURE — 96360 HYDRATION IV INFUSION INIT: CPT

## 2021-10-19 PROCEDURE — 80053 COMPREHEN METABOLIC PANEL: CPT | Performed by: NURSE PRACTITIONER

## 2021-10-19 PROCEDURE — 85025 COMPLETE CBC W/AUTO DIFF WBC: CPT | Performed by: NURSE PRACTITIONER

## 2021-10-19 PROCEDURE — 85379 FIBRIN DEGRADATION QUANT: CPT | Performed by: NURSE PRACTITIONER

## 2021-10-19 PROCEDURE — U0002 COVID-19 LAB TEST NON-CDC: HCPCS | Performed by: NURSE PRACTITIONER

## 2021-10-19 PROCEDURE — 25000003 PHARM REV CODE 250: Performed by: NURSE PRACTITIONER

## 2021-10-19 PROCEDURE — 93010 EKG 12-LEAD: ICD-10-PCS | Mod: ,,, | Performed by: INTERNAL MEDICINE

## 2021-10-19 PROCEDURE — 96361 HYDRATE IV INFUSION ADD-ON: CPT

## 2021-10-19 PROCEDURE — 93010 ELECTROCARDIOGRAM REPORT: CPT | Mod: ,,, | Performed by: INTERNAL MEDICINE

## 2021-10-19 RX ADMIN — SODIUM CHLORIDE 1000 ML: 0.9 INJECTION, SOLUTION INTRAVENOUS at 02:10

## 2021-10-27 ENCOUNTER — TELEPHONE (OUTPATIENT)
Dept: HEMATOLOGY/ONCOLOGY | Facility: CLINIC | Age: 46
End: 2021-10-27
Payer: MEDICARE

## 2021-10-28 ENCOUNTER — OFFICE VISIT (OUTPATIENT)
Dept: HEMATOLOGY/ONCOLOGY | Facility: CLINIC | Age: 46
End: 2021-10-28
Payer: MEDICARE

## 2021-10-28 VITALS
TEMPERATURE: 98 F | BODY MASS INDEX: 31.1 KG/M2 | SYSTOLIC BLOOD PRESSURE: 109 MMHG | RESPIRATION RATE: 20 BRPM | WEIGHT: 175.5 LBS | HEIGHT: 63 IN | OXYGEN SATURATION: 100 % | DIASTOLIC BLOOD PRESSURE: 68 MMHG | HEART RATE: 69 BPM

## 2021-10-28 DIAGNOSIS — D53.9 NUTRITIONAL ANEMIA, UNSPECIFIED: ICD-10-CM

## 2021-10-28 DIAGNOSIS — Z86.39: Primary | ICD-10-CM

## 2021-10-28 DIAGNOSIS — D69.9 BLEEDING DISORDER: ICD-10-CM

## 2021-10-28 DIAGNOSIS — D53.8 OTHER SPECIFIED NUTRITIONAL ANEMIAS: ICD-10-CM

## 2021-10-28 DIAGNOSIS — M94.9 DISORDER OF CARTILAGE, UNSPECIFIED: ICD-10-CM

## 2021-10-28 PROCEDURE — 99205 PR OFFICE/OUTPT VISIT, NEW, LEVL V, 60-74 MIN: ICD-10-PCS | Mod: S$GLB,,, | Performed by: INTERNAL MEDICINE

## 2021-10-28 PROCEDURE — 99499 UNLISTED E&M SERVICE: CPT | Mod: S$GLB,,, | Performed by: INTERNAL MEDICINE

## 2021-10-28 PROCEDURE — 3078F DIAST BP <80 MM HG: CPT | Mod: CPTII,S$GLB,, | Performed by: INTERNAL MEDICINE

## 2021-10-28 PROCEDURE — 1160F RVW MEDS BY RX/DR IN RCRD: CPT | Mod: CPTII,S$GLB,, | Performed by: INTERNAL MEDICINE

## 2021-10-28 PROCEDURE — 99205 OFFICE O/P NEW HI 60 MIN: CPT | Mod: S$GLB,,, | Performed by: INTERNAL MEDICINE

## 2021-10-28 PROCEDURE — 3008F PR BODY MASS INDEX (BMI) DOCUMENTED: ICD-10-PCS | Mod: CPTII,S$GLB,, | Performed by: INTERNAL MEDICINE

## 2021-10-28 PROCEDURE — 1159F MED LIST DOCD IN RCRD: CPT | Mod: CPTII,S$GLB,, | Performed by: INTERNAL MEDICINE

## 2021-10-28 PROCEDURE — 3008F BODY MASS INDEX DOCD: CPT | Mod: CPTII,S$GLB,, | Performed by: INTERNAL MEDICINE

## 2021-10-28 PROCEDURE — 1159F PR MEDICATION LIST DOCUMENTED IN MEDICAL RECORD: ICD-10-PCS | Mod: CPTII,S$GLB,, | Performed by: INTERNAL MEDICINE

## 2021-10-28 PROCEDURE — 3074F SYST BP LT 130 MM HG: CPT | Mod: CPTII,S$GLB,, | Performed by: INTERNAL MEDICINE

## 2021-10-28 PROCEDURE — 99499 RISK ADDL DX/OHS AUDIT: ICD-10-PCS | Mod: S$GLB,,, | Performed by: INTERNAL MEDICINE

## 2021-10-28 PROCEDURE — 3074F PR MOST RECENT SYSTOLIC BLOOD PRESSURE < 130 MM HG: ICD-10-PCS | Mod: CPTII,S$GLB,, | Performed by: INTERNAL MEDICINE

## 2021-10-28 PROCEDURE — 3078F PR MOST RECENT DIASTOLIC BLOOD PRESSURE < 80 MM HG: ICD-10-PCS | Mod: CPTII,S$GLB,, | Performed by: INTERNAL MEDICINE

## 2021-10-28 PROCEDURE — 99999 PR PBB SHADOW E&M-EST. PATIENT-LVL III: ICD-10-PCS | Mod: PBBFAC,,, | Performed by: INTERNAL MEDICINE

## 2021-10-28 PROCEDURE — 1160F PR REVIEW ALL MEDS BY PRESCRIBER/CLIN PHARMACIST DOCUMENTED: ICD-10-PCS | Mod: CPTII,S$GLB,, | Performed by: INTERNAL MEDICINE

## 2021-10-28 PROCEDURE — 99999 PR PBB SHADOW E&M-EST. PATIENT-LVL III: CPT | Mod: PBBFAC,,, | Performed by: INTERNAL MEDICINE

## 2021-10-28 RX ORDER — CHLORHEXIDINE GLUCONATE ORAL RINSE 1.2 MG/ML
SOLUTION DENTAL
COMMUNITY
Start: 2021-10-15

## 2021-10-28 RX ORDER — ONDANSETRON 4 MG/1
4 TABLET, ORALLY DISINTEGRATING ORAL EVERY 4 HOURS PRN
COMMUNITY
Start: 2021-10-15

## 2021-10-28 RX ORDER — IBUPROFEN 600 MG/1
600 TABLET ORAL EVERY 6 HOURS PRN
COMMUNITY
Start: 2021-10-15

## 2021-10-28 RX ORDER — HYDROCODONE BITARTRATE AND ACETAMINOPHEN 7.5; 325 MG/1; MG/1
1 TABLET ORAL EVERY 4 HOURS PRN
COMMUNITY
Start: 2021-10-15

## 2021-11-01 ENCOUNTER — LAB VISIT (OUTPATIENT)
Dept: LAB | Facility: HOSPITAL | Age: 46
End: 2021-11-01
Payer: MEDICARE

## 2021-11-01 DIAGNOSIS — M94.9 DISORDER OF CARTILAGE, UNSPECIFIED: ICD-10-CM

## 2021-11-01 DIAGNOSIS — D69.9 BLEEDING DISORDER: ICD-10-CM

## 2021-11-01 DIAGNOSIS — D53.9 NUTRITIONAL ANEMIA, UNSPECIFIED: ICD-10-CM

## 2021-11-01 DIAGNOSIS — D53.8 OTHER SPECIFIED NUTRITIONAL ANEMIAS: ICD-10-CM

## 2021-11-01 LAB
25(OH)D3+25(OH)D2 SERPL-MCNC: 33 NG/ML (ref 30–96)
ALBUMIN SERPL BCP-MCNC: 4 G/DL (ref 3.5–5.2)
ALP SERPL-CCNC: 40 U/L (ref 55–135)
ALT SERPL W/O P-5'-P-CCNC: 13 U/L (ref 10–44)
ANION GAP SERPL CALC-SCNC: 10 MMOL/L (ref 8–16)
APTT BLDCRRT: 24.7 SEC (ref 21–32)
AST SERPL-CCNC: 17 U/L (ref 10–40)
BASOPHILS # BLD AUTO: 0.02 K/UL (ref 0–0.2)
BASOPHILS NFR BLD: 0.4 % (ref 0–1.9)
BILIRUB SERPL-MCNC: 0.3 MG/DL (ref 0.1–1)
BUN SERPL-MCNC: 12 MG/DL (ref 6–20)
CALCIUM SERPL-MCNC: 8.9 MG/DL (ref 8.7–10.5)
CHLORIDE SERPL-SCNC: 106 MMOL/L (ref 95–110)
CO2 SERPL-SCNC: 24 MMOL/L (ref 23–29)
CREAT SERPL-MCNC: 0.7 MG/DL (ref 0.5–1.4)
DIFFERENTIAL METHOD: ABNORMAL
EOSINOPHIL # BLD AUTO: 0.1 K/UL (ref 0–0.5)
EOSINOPHIL NFR BLD: 1.1 % (ref 0–8)
ERYTHROCYTE [DISTWIDTH] IN BLOOD BY AUTOMATED COUNT: 12.4 % (ref 11.5–14.5)
EST. GFR  (AFRICAN AMERICAN): >60 ML/MIN/1.73 M^2
EST. GFR  (NON AFRICAN AMERICAN): >60 ML/MIN/1.73 M^2
FERRITIN SERPL-MCNC: 12 NG/ML (ref 20–300)
FOLATE SERPL-MCNC: 7.4 NG/ML (ref 4–24)
GLUCOSE SERPL-MCNC: 95 MG/DL (ref 70–110)
HCT VFR BLD AUTO: 31.9 % (ref 37–48.5)
HGB BLD-MCNC: 10.3 G/DL (ref 12–16)
IMM GRANULOCYTES # BLD AUTO: 0.02 K/UL (ref 0–0.04)
IMM GRANULOCYTES NFR BLD AUTO: 0.4 % (ref 0–0.5)
INR PPP: 0.9 (ref 0.8–1.2)
IRON SERPL-MCNC: 24 UG/DL (ref 30–160)
LDH SERPL L TO P-CCNC: 165 U/L (ref 110–260)
LYMPHOCYTES # BLD AUTO: 1.3 K/UL (ref 1–4.8)
LYMPHOCYTES NFR BLD: 23.5 % (ref 18–48)
MCH RBC QN AUTO: 30.8 PG (ref 27–31)
MCHC RBC AUTO-ENTMCNC: 32.3 G/DL (ref 32–36)
MCV RBC AUTO: 96 FL (ref 82–98)
MONOCYTES # BLD AUTO: 0.4 K/UL (ref 0.3–1)
MONOCYTES NFR BLD: 7.6 % (ref 4–15)
NEUTROPHILS # BLD AUTO: 3.8 K/UL (ref 1.8–7.7)
NEUTROPHILS NFR BLD: 67 % (ref 38–73)
NRBC BLD-RTO: 0 /100 WBC
PLATELET # BLD AUTO: 322 K/UL (ref 150–450)
PMV BLD AUTO: 8.8 FL (ref 9.2–12.9)
POTASSIUM SERPL-SCNC: 3.9 MMOL/L (ref 3.5–5.1)
PROT SERPL-MCNC: 7.1 G/DL (ref 6–8.4)
PROTHROMBIN TIME: 10.3 SEC (ref 9–12.5)
RBC # BLD AUTO: 3.34 M/UL (ref 4–5.4)
RETICS/RBC NFR AUTO: 3 % (ref 0.5–2.5)
SATURATED IRON: 5 % (ref 20–50)
SODIUM SERPL-SCNC: 140 MMOL/L (ref 136–145)
TOTAL IRON BINDING CAPACITY: 474 UG/DL (ref 250–450)
TRANSFERRIN SERPL-MCNC: 320 MG/DL (ref 200–375)
VIT B12 SERPL-MCNC: 1657 PG/ML (ref 210–950)
WBC # BLD AUTO: 5.66 K/UL (ref 3.9–12.7)

## 2021-11-01 PROCEDURE — 86038 ANTINUCLEAR ANTIBODIES: CPT | Performed by: INTERNAL MEDICINE

## 2021-11-01 PROCEDURE — 85240 CLOT FACTOR VIII AHG 1 STAGE: CPT | Performed by: INTERNAL MEDICINE

## 2021-11-01 PROCEDURE — 86334 PATHOLOGIST INTERPRETATION IFE: ICD-10-PCS | Mod: 26,,, | Performed by: PATHOLOGY

## 2021-11-01 PROCEDURE — 83520 IMMUNOASSAY QUANT NOS NONAB: CPT | Performed by: INTERNAL MEDICINE

## 2021-11-01 PROCEDURE — 84165 PATHOLOGIST INTERPRETATION SPE: ICD-10-PCS | Mod: 26,,, | Performed by: PATHOLOGY

## 2021-11-01 PROCEDURE — 83615 LACTATE (LD) (LDH) ENZYME: CPT | Performed by: INTERNAL MEDICINE

## 2021-11-01 PROCEDURE — 84165 PROTEIN E-PHORESIS SERUM: CPT | Mod: 26,,, | Performed by: PATHOLOGY

## 2021-11-01 PROCEDURE — 84165 PROTEIN E-PHORESIS SERUM: CPT | Performed by: INTERNAL MEDICINE

## 2021-11-01 PROCEDURE — 85610 PROTHROMBIN TIME: CPT | Performed by: INTERNAL MEDICINE

## 2021-11-01 PROCEDURE — 85025 COMPLETE CBC W/AUTO DIFF WBC: CPT | Performed by: INTERNAL MEDICINE

## 2021-11-01 PROCEDURE — 82728 ASSAY OF FERRITIN: CPT | Performed by: INTERNAL MEDICINE

## 2021-11-01 PROCEDURE — 82746 ASSAY OF FOLIC ACID SERUM: CPT | Performed by: INTERNAL MEDICINE

## 2021-11-01 PROCEDURE — 82306 VITAMIN D 25 HYDROXY: CPT | Performed by: INTERNAL MEDICINE

## 2021-11-01 PROCEDURE — 86334 IMMUNOFIX E-PHORESIS SERUM: CPT | Performed by: INTERNAL MEDICINE

## 2021-11-01 PROCEDURE — 85045 AUTOMATED RETICULOCYTE COUNT: CPT | Performed by: INTERNAL MEDICINE

## 2021-11-01 PROCEDURE — 82607 VITAMIN B-12: CPT | Performed by: INTERNAL MEDICINE

## 2021-11-01 PROCEDURE — 85730 THROMBOPLASTIN TIME PARTIAL: CPT | Performed by: INTERNAL MEDICINE

## 2021-11-01 PROCEDURE — 86334 IMMUNOFIX E-PHORESIS SERUM: CPT | Mod: 26,,, | Performed by: PATHOLOGY

## 2021-11-01 PROCEDURE — 84466 ASSAY OF TRANSFERRIN: CPT | Performed by: INTERNAL MEDICINE

## 2021-11-01 PROCEDURE — 80053 COMPREHEN METABOLIC PANEL: CPT | Performed by: INTERNAL MEDICINE

## 2021-11-02 LAB
ALBUMIN SERPL ELPH-MCNC: 3.97 G/DL (ref 3.35–5.55)
ALPHA1 GLOB SERPL ELPH-MCNC: 0.3 G/DL (ref 0.17–0.41)
ALPHA2 GLOB SERPL ELPH-MCNC: 0.7 G/DL (ref 0.43–0.99)
ANA SER QL IF: NORMAL
B-GLOBULIN SERPL ELPH-MCNC: 0.72 G/DL (ref 0.5–1.1)
GAMMA GLOB SERPL ELPH-MCNC: 1.01 G/DL (ref 0.67–1.58)
INTERPRETATION SERPL IFE-IMP: NORMAL
KAPPA LC SER QL IA: 1.87 MG/DL (ref 0.33–1.94)
KAPPA LC/LAMBDA SER IA: 1.3 (ref 0.26–1.65)
LAMBDA LC SER QL IA: 1.44 MG/DL (ref 0.57–2.63)
PATHOLOGIST INTERPRETATION IFE: NORMAL
PATHOLOGIST INTERPRETATION SPE: NORMAL
PROT SERPL-MCNC: 6.7 G/DL (ref 6–8.4)

## 2021-11-03 LAB
FACT VIII ACT/NOR PPP: 88 % (ref 55–200)
VON WILLEBRAND EVAL PPP-IMP: NORMAL
VWF AG ACT/NOR PPP IA: 93 % (ref 55–200)
VWF:AC ACT/NOR PPP IA: 74 % (ref 55–200)

## 2021-11-16 ENCOUNTER — OFFICE VISIT (OUTPATIENT)
Dept: HEMATOLOGY/ONCOLOGY | Facility: CLINIC | Age: 46
End: 2021-11-16
Payer: MEDICARE

## 2021-11-16 VITALS
OXYGEN SATURATION: 98 % | DIASTOLIC BLOOD PRESSURE: 74 MMHG | WEIGHT: 172.81 LBS | HEART RATE: 94 BPM | SYSTOLIC BLOOD PRESSURE: 125 MMHG | BODY MASS INDEX: 31.11 KG/M2

## 2021-11-16 DIAGNOSIS — D50.8 OTHER IRON DEFICIENCY ANEMIAS: ICD-10-CM

## 2021-11-16 DIAGNOSIS — M94.9 DISORDER OF CARTILAGE, UNSPECIFIED: ICD-10-CM

## 2021-11-16 PROCEDURE — 3074F PR MOST RECENT SYSTOLIC BLOOD PRESSURE < 130 MM HG: ICD-10-PCS | Mod: CPTII,S$GLB,, | Performed by: INTERNAL MEDICINE

## 2021-11-16 PROCEDURE — 1160F PR REVIEW ALL MEDS BY PRESCRIBER/CLIN PHARMACIST DOCUMENTED: ICD-10-PCS | Mod: CPTII,S$GLB,, | Performed by: INTERNAL MEDICINE

## 2021-11-16 PROCEDURE — 1159F MED LIST DOCD IN RCRD: CPT | Mod: CPTII,S$GLB,, | Performed by: INTERNAL MEDICINE

## 2021-11-16 PROCEDURE — 99214 OFFICE O/P EST MOD 30 MIN: CPT | Mod: S$GLB,,, | Performed by: INTERNAL MEDICINE

## 2021-11-16 PROCEDURE — 3074F SYST BP LT 130 MM HG: CPT | Mod: CPTII,S$GLB,, | Performed by: INTERNAL MEDICINE

## 2021-11-16 PROCEDURE — 3008F BODY MASS INDEX DOCD: CPT | Mod: CPTII,S$GLB,, | Performed by: INTERNAL MEDICINE

## 2021-11-16 PROCEDURE — 1159F PR MEDICATION LIST DOCUMENTED IN MEDICAL RECORD: ICD-10-PCS | Mod: CPTII,S$GLB,, | Performed by: INTERNAL MEDICINE

## 2021-11-16 PROCEDURE — 99999 PR PBB SHADOW E&M-EST. PATIENT-LVL III: CPT | Mod: PBBFAC,,, | Performed by: INTERNAL MEDICINE

## 2021-11-16 PROCEDURE — 3008F PR BODY MASS INDEX (BMI) DOCUMENTED: ICD-10-PCS | Mod: CPTII,S$GLB,, | Performed by: INTERNAL MEDICINE

## 2021-11-16 PROCEDURE — 3078F PR MOST RECENT DIASTOLIC BLOOD PRESSURE < 80 MM HG: ICD-10-PCS | Mod: CPTII,S$GLB,, | Performed by: INTERNAL MEDICINE

## 2021-11-16 PROCEDURE — 99999 PR PBB SHADOW E&M-EST. PATIENT-LVL III: ICD-10-PCS | Mod: PBBFAC,,, | Performed by: INTERNAL MEDICINE

## 2021-11-16 PROCEDURE — 99214 PR OFFICE/OUTPT VISIT, EST, LEVL IV, 30-39 MIN: ICD-10-PCS | Mod: S$GLB,,, | Performed by: INTERNAL MEDICINE

## 2021-11-16 PROCEDURE — 1160F RVW MEDS BY RX/DR IN RCRD: CPT | Mod: CPTII,S$GLB,, | Performed by: INTERNAL MEDICINE

## 2021-11-16 PROCEDURE — 3078F DIAST BP <80 MM HG: CPT | Mod: CPTII,S$GLB,, | Performed by: INTERNAL MEDICINE

## 2021-11-16 RX ORDER — SODIUM CHLORIDE 0.9 % (FLUSH) 0.9 %
10 SYRINGE (ML) INJECTION
Status: CANCELLED | OUTPATIENT
Start: 2021-11-25

## 2021-11-16 RX ORDER — SODIUM CHLORIDE 0.9 % (FLUSH) 0.9 %
10 SYRINGE (ML) INJECTION
Status: CANCELLED | OUTPATIENT
Start: 2021-11-16

## 2021-11-16 RX ORDER — HEPARIN 100 UNIT/ML
500 SYRINGE INTRAVENOUS
Status: CANCELLED | OUTPATIENT
Start: 2021-11-16

## 2021-11-16 RX ORDER — HEPARIN 100 UNIT/ML
500 SYRINGE INTRAVENOUS
Status: CANCELLED | OUTPATIENT
Start: 2021-11-25

## 2021-11-24 ENCOUNTER — INFUSION (OUTPATIENT)
Dept: INFUSION THERAPY | Facility: HOSPITAL | Age: 46
End: 2021-11-24
Attending: INTERNAL MEDICINE
Payer: MEDICARE

## 2021-11-24 VITALS
TEMPERATURE: 99 F | SYSTOLIC BLOOD PRESSURE: 114 MMHG | RESPIRATION RATE: 16 BRPM | DIASTOLIC BLOOD PRESSURE: 62 MMHG | OXYGEN SATURATION: 98 % | HEART RATE: 70 BPM

## 2021-11-24 DIAGNOSIS — D50.8 OTHER IRON DEFICIENCY ANEMIAS: Primary | ICD-10-CM

## 2021-11-24 PROCEDURE — 96374 THER/PROPH/DIAG INJ IV PUSH: CPT

## 2021-11-29 RX ORDER — HEPARIN 100 UNIT/ML
500 SYRINGE INTRAVENOUS
Status: CANCELLED | OUTPATIENT
Start: 2021-12-01

## 2021-11-29 RX ORDER — SODIUM CHLORIDE 0.9 % (FLUSH) 0.9 %
10 SYRINGE (ML) INJECTION
Status: CANCELLED | OUTPATIENT
Start: 2021-12-01

## 2021-12-01 ENCOUNTER — INFUSION (OUTPATIENT)
Dept: INFUSION THERAPY | Facility: HOSPITAL | Age: 46
End: 2021-12-01
Attending: INTERNAL MEDICINE
Payer: MEDICARE

## 2021-12-01 VITALS
DIASTOLIC BLOOD PRESSURE: 55 MMHG | TEMPERATURE: 99 F | SYSTOLIC BLOOD PRESSURE: 124 MMHG | HEART RATE: 80 BPM | OXYGEN SATURATION: 97 % | RESPIRATION RATE: 16 BRPM

## 2021-12-01 DIAGNOSIS — D50.8 OTHER IRON DEFICIENCY ANEMIAS: Primary | ICD-10-CM

## 2021-12-01 PROCEDURE — 63600175 PHARM REV CODE 636 W HCPCS: Mod: JG | Performed by: INTERNAL MEDICINE

## 2021-12-01 PROCEDURE — 96374 THER/PROPH/DIAG INJ IV PUSH: CPT

## 2021-12-01 PROCEDURE — 25000003 PHARM REV CODE 250: Performed by: INTERNAL MEDICINE

## 2021-12-01 RX ADMIN — FERRIC CARBOXYMALTOSE INJECTION 750 MG: 50 INJECTION, SOLUTION INTRAVENOUS at 01:12

## 2021-12-16 ENCOUNTER — IMMUNIZATION (OUTPATIENT)
Dept: INTERNAL MEDICINE | Facility: CLINIC | Age: 46
End: 2021-12-16
Payer: MEDICARE

## 2021-12-16 DIAGNOSIS — Z23 NEED FOR VACCINATION: Primary | ICD-10-CM

## 2021-12-16 PROCEDURE — 0004A COVID-19, MRNA, LNP-S, PF, 30 MCG/0.3 ML DOSE VACCINE: CPT | Mod: CV19,PBBFAC | Performed by: INTERNAL MEDICINE

## 2021-12-17 ENCOUNTER — PES CALL (OUTPATIENT)
Dept: ADMINISTRATIVE | Facility: CLINIC | Age: 46
End: 2021-12-17
Payer: MEDICARE

## 2021-12-29 ENCOUNTER — PES CALL (OUTPATIENT)
Dept: ADMINISTRATIVE | Facility: CLINIC | Age: 46
End: 2021-12-29
Payer: MEDICARE

## 2022-01-18 ENCOUNTER — LAB VISIT (OUTPATIENT)
Dept: LAB | Facility: HOSPITAL | Age: 47
End: 2022-01-18
Attending: FAMILY MEDICINE
Payer: MEDICARE

## 2022-01-18 DIAGNOSIS — M94.9 DISORDER OF CARTILAGE, UNSPECIFIED: ICD-10-CM

## 2022-01-18 DIAGNOSIS — D50.8 OTHER IRON DEFICIENCY ANEMIAS: ICD-10-CM

## 2022-01-18 LAB
ALBUMIN SERPL BCP-MCNC: 4.3 G/DL (ref 3.5–5.2)
ALP SERPL-CCNC: 52 U/L (ref 55–135)
ALT SERPL W/O P-5'-P-CCNC: 22 U/L (ref 10–44)
ANION GAP SERPL CALC-SCNC: 6 MMOL/L (ref 8–16)
AST SERPL-CCNC: 20 U/L (ref 10–40)
BASOPHILS # BLD AUTO: 0.01 K/UL (ref 0–0.2)
BASOPHILS NFR BLD: 0.1 % (ref 0–1.9)
BILIRUB SERPL-MCNC: 0.3 MG/DL (ref 0.1–1)
BUN SERPL-MCNC: 12 MG/DL (ref 6–20)
CALCIUM SERPL-MCNC: 9.6 MG/DL (ref 8.7–10.5)
CHLORIDE SERPL-SCNC: 105 MMOL/L (ref 95–110)
CO2 SERPL-SCNC: 26 MMOL/L (ref 23–29)
CREAT SERPL-MCNC: 0.8 MG/DL (ref 0.5–1.4)
DIFFERENTIAL METHOD: ABNORMAL
EOSINOPHIL # BLD AUTO: 0.1 K/UL (ref 0–0.5)
EOSINOPHIL NFR BLD: 0.8 % (ref 0–8)
ERYTHROCYTE [DISTWIDTH] IN BLOOD BY AUTOMATED COUNT: 14.6 % (ref 11.5–14.5)
EST. GFR  (AFRICAN AMERICAN): >60 ML/MIN/1.73 M^2
EST. GFR  (NON AFRICAN AMERICAN): >60 ML/MIN/1.73 M^2
FERRITIN SERPL-MCNC: 284 NG/ML (ref 20–300)
GLUCOSE SERPL-MCNC: 94 MG/DL (ref 70–110)
HCT VFR BLD AUTO: 40.1 % (ref 37–48.5)
HGB BLD-MCNC: 13.6 G/DL (ref 12–16)
IMM GRANULOCYTES # BLD AUTO: 0.02 K/UL (ref 0–0.04)
IMM GRANULOCYTES NFR BLD AUTO: 0.3 % (ref 0–0.5)
IRON SERPL-MCNC: 119 UG/DL (ref 30–160)
LYMPHOCYTES # BLD AUTO: 2 K/UL (ref 1–4.8)
LYMPHOCYTES NFR BLD: 26.6 % (ref 18–48)
MCH RBC QN AUTO: 30.6 PG (ref 27–31)
MCHC RBC AUTO-ENTMCNC: 33.9 G/DL (ref 32–36)
MCV RBC AUTO: 90 FL (ref 82–98)
MONOCYTES # BLD AUTO: 0.5 K/UL (ref 0.3–1)
MONOCYTES NFR BLD: 6.3 % (ref 4–15)
NEUTROPHILS # BLD AUTO: 5 K/UL (ref 1.8–7.7)
NEUTROPHILS NFR BLD: 65.9 % (ref 38–73)
NRBC BLD-RTO: 0 /100 WBC
PLATELET # BLD AUTO: 224 K/UL (ref 150–450)
PMV BLD AUTO: 8.8 FL (ref 9.2–12.9)
POTASSIUM SERPL-SCNC: 4.6 MMOL/L (ref 3.5–5.1)
PROT SERPL-MCNC: 7.8 G/DL (ref 6–8.4)
RBC # BLD AUTO: 4.45 M/UL (ref 4–5.4)
SATURATED IRON: 37 % (ref 20–50)
SODIUM SERPL-SCNC: 137 MMOL/L (ref 136–145)
TOTAL IRON BINDING CAPACITY: 324 UG/DL (ref 250–450)
TRANSFERRIN SERPL-MCNC: 219 MG/DL (ref 200–375)
WBC # BLD AUTO: 7.52 K/UL (ref 3.9–12.7)

## 2022-01-18 PROCEDURE — 85025 COMPLETE CBC W/AUTO DIFF WBC: CPT | Performed by: INTERNAL MEDICINE

## 2022-01-18 PROCEDURE — 36415 COLL VENOUS BLD VENIPUNCTURE: CPT | Performed by: INTERNAL MEDICINE

## 2022-01-18 PROCEDURE — 84466 ASSAY OF TRANSFERRIN: CPT | Performed by: INTERNAL MEDICINE

## 2022-01-18 PROCEDURE — 82306 VITAMIN D 25 HYDROXY: CPT | Performed by: INTERNAL MEDICINE

## 2022-01-18 PROCEDURE — 82728 ASSAY OF FERRITIN: CPT | Performed by: INTERNAL MEDICINE

## 2022-01-18 PROCEDURE — 80053 COMPREHEN METABOLIC PANEL: CPT | Performed by: INTERNAL MEDICINE

## 2022-01-19 ENCOUNTER — OFFICE VISIT (OUTPATIENT)
Dept: HEMATOLOGY/ONCOLOGY | Facility: CLINIC | Age: 47
End: 2022-01-19
Payer: MEDICARE

## 2022-01-19 DIAGNOSIS — D50.8 OTHER IRON DEFICIENCY ANEMIAS: Primary | ICD-10-CM

## 2022-01-19 DIAGNOSIS — D69.9 BLEEDING DISORDER: ICD-10-CM

## 2022-01-19 LAB — 25(OH)D3+25(OH)D2 SERPL-MCNC: 48 NG/ML (ref 30–96)

## 2022-01-19 PROCEDURE — 1160F RVW MEDS BY RX/DR IN RCRD: CPT | Mod: CPTII,95,, | Performed by: INTERNAL MEDICINE

## 2022-01-19 PROCEDURE — 99213 OFFICE O/P EST LOW 20 MIN: CPT | Mod: 95,,, | Performed by: INTERNAL MEDICINE

## 2022-01-19 PROCEDURE — 1159F MED LIST DOCD IN RCRD: CPT | Mod: CPTII,95,, | Performed by: INTERNAL MEDICINE

## 2022-01-19 PROCEDURE — 99213 PR OFFICE/OUTPT VISIT, EST, LEVL III, 20-29 MIN: ICD-10-PCS | Mod: 95,,, | Performed by: INTERNAL MEDICINE

## 2022-01-19 PROCEDURE — 1160F PR REVIEW ALL MEDS BY PRESCRIBER/CLIN PHARMACIST DOCUMENTED: ICD-10-PCS | Mod: CPTII,95,, | Performed by: INTERNAL MEDICINE

## 2022-01-19 PROCEDURE — 1159F PR MEDICATION LIST DOCUMENTED IN MEDICAL RECORD: ICD-10-PCS | Mod: CPTII,95,, | Performed by: INTERNAL MEDICINE

## 2022-01-19 NOTE — PROGRESS NOTES
PATIENT: Jody Loza  MRN: [id number]  DATE: 1/19/2022    Chief Complaint: anemia, bleeding    Subjective:     History of Present Illness:     She has history of albinism and visual impairment and presented to the ED 10/19/2021 with lightheadedness and dizziness and nausea x4 days.      She had 2 teeth extracted a few days ago and blood excessively after that.    When she was a child she underwent tonsillectomy and had a lot of bleeding after that which subsequently stopped.    She also had an ovarian cyst removed and stated she had bleeding which was unusual.    She had frequent nosebleeds as a kid, she is good now.    EGD March 2018 showed nonbleeding gastric ulcers with no stigmata of bleeding.    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  She just wants to be able to work without feeling that much tired.    INTERVAL HISTORY:   -got 2 doses of Injectafer November/December 2021  -tiredness has improved after IV iron but still some fatigue he has still there  -continues to have menstrual cycles, sometimes heavy    Past Medical, Surgical, Family and Social History Reviewed.    Medications and Allergies reviewed      Review of Systems   Constitutional: Negative for fever and unexpected weight change.   HENT: Positive for dental problem.    Genitourinary: Positive for menstrual problem.       Objective:     There were no vitals filed for this visit.    BMI: There is no height or weight on file to calculate BMI.    Physical Exam  No physical examination was done as this is a virtual visit    Assessment:       1. Other iron deficiency anemias    2. Bleeding disorder        Plan:   Iron deficiency Anemia  -etiology menstrual cycle blood loss  -she tried oral iron and is unresponsive/intolerant  -received 2 doses of Injectafer November/December 2021  -labs CBC, CMP, ferritin, iron saturation reviewed  -no need further IV iron therapy  -check CBC, CMP, ferritin, iron  saturation in 4 months    R/o bleeding disorder  -PT and PTT within normal limits  -platelet count normal  -von Willebrand profile normal  -bleeding disorder has been ruled out, increased bruising secondary to iron deficiency which has since been corrected  -she is cleared for dental procedures from a hematology standpoint    Will check labs in 4 months followed by virtual visit

## 2022-02-14 PROCEDURE — 81025 URINE PREGNANCY TEST: CPT | Performed by: EMERGENCY MEDICINE

## 2022-02-14 PROCEDURE — 82962 GLUCOSE BLOOD TEST: CPT

## 2022-02-14 PROCEDURE — 64450 NJX AA&/STRD OTHER PN/BRANCH: CPT

## 2022-02-14 PROCEDURE — 99284 EMERGENCY DEPT VISIT MOD MDM: CPT | Mod: 25

## 2022-02-15 ENCOUNTER — HOSPITAL ENCOUNTER (EMERGENCY)
Facility: HOSPITAL | Age: 47
Discharge: HOME | End: 2022-02-15
Attending: EMERGENCY MEDICINE
Payer: MEDICARE

## 2022-02-15 VITALS
DIASTOLIC BLOOD PRESSURE: 60 MMHG | WEIGHT: 165 LBS | RESPIRATION RATE: 18 BRPM | TEMPERATURE: 99 F | HEART RATE: 77 BPM | OXYGEN SATURATION: 99 % | SYSTOLIC BLOOD PRESSURE: 127 MMHG | BODY MASS INDEX: 30.36 KG/M2 | HEIGHT: 62 IN
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
## 2022-02-15 LAB
B-HCG UR QL: NEGATIVE
CTP QC/QA: YES
POCT GLUCOSE: 99 MG/DL (ref 70–110)

## 2022-02-15 PROCEDURE — 25000003 PHARM REV CODE 250: Performed by: PHYSICIAN ASSISTANT

## 2022-02-15 PROCEDURE — 64450 NJX AA&/STRD OTHER PN/BRANCH: CPT

## 2022-02-15 RX ORDER — AMOXICILLIN AND CLAVULANATE POTASSIUM 875; 125 MG/1; MG/1
1 TABLET, FILM COATED ORAL
Status: COMPLETED | OUTPATIENT
Start: 2022-02-15 | End: 2022-02-15

## 2022-02-15 RX ORDER — AMOXICILLIN AND CLAVULANATE POTASSIUM 875; 125 MG/1; MG/1
1 TABLET, FILM COATED ORAL 2 TIMES DAILY
Qty: 20 TABLET | Refills: 0 | Status: SHIPPED | OUTPATIENT
Start: 2022-02-15 | End: 2022-02-25

## 2022-02-15 RX ORDER — LIDOCAINE HYDROCHLORIDE 10 MG/ML
5 INJECTION INFILTRATION; PERINEURAL
Status: COMPLETED | OUTPATIENT
Start: 2022-02-15 | End: 2022-02-15

## 2022-02-15 RX ADMIN — AMOXICILLIN AND CLAVULANATE POTASSIUM 1 TABLET: 875; 125 TABLET, FILM COATED ORAL at 02:02

## 2022-02-15 RX ADMIN — LIDOCAINE HYDROCHLORIDE 5 ML: 10 INJECTION, SOLUTION INFILTRATION; PERINEURAL at 02:02

## 2022-02-15 NOTE — ED TRIAGE NOTES
Pt comes in with c/o animal bite to right index finger. Pt states she was feeding a stray cat when it became excited and bit her finger. Pt states she is not sure if her tetanus shot is up to date. Denies any other injuries or s/s. No bleeding noted. Small lac noted to pt finger.

## 2022-02-15 NOTE — DISCHARGE INSTRUCTIONS
Keep current dressing in place for 12-24 hours, after that change dressing once or twice daily.  Clean the wound gently with soap and water.  Apply antibiotic ointment with each dressing change.  Take antibiotics as prescribed, try to take with meals to limit nausea.  Tylenol or ibuprofen as needed for pain.    Please follow-up with primary care provider for wound re-evaluation.    Return to this ED if you begin with fever, if wounds begin to drain foul-smelling or purulent fluid, she develop fever, if worsening pain hand swelling despite 48 hours of antibiotics, if any other problems occur.

## 2022-02-15 NOTE — ED PROVIDER NOTES
Encounter Date: 2/14/2022       History     Chief Complaint   Patient presents with    Animal Bite     Stray cat bite to right finger while feeding.      47yo F with chief complaint R index finger puncture wound 2/2 cat bite sustained at approx 4pm today.    Pt feeding stray cat for the past few months, was able to bring cat indoors recently, no suspicion for rabies. Cat bit pt while feeding this afternoon. Puncture wounds, swelling to R 2nd digit distal phalanx region, serous drainage from volar wound, pain, ttp. No fever. No uncontrolled bleeding. Limited ROM involved DIP.  Unsure of last tetanus. L hand dominant.         Review of patient's allergies indicates:  No Known Allergies  Past Medical History:   Diagnosis Date    H/O albinism     Ulcer of abdomen wall     Vision impairment     blindness/albinism      Past Surgical History:   Procedure Laterality Date    benign ovarian cyst      ESOPHAGOGASTRODUODENOSCOPY N/A 11/11/2019    Procedure: ESOPHAGOGASTRODUODENOSCOPY (EGD);  Surgeon: Sue Munoz MD;  Location: [facility];  Service: Endoscopy;  Laterality: N/A;    TONSILLECTOMY       Family History   Problem Relation Age of Onset    Fibrocystic breast disease Mother     Hyperlipidemia Mother     Colon polyps Mother     Hyperlipidemia Father     Polycythemia Maternal Aunt     Heart disease Maternal Grandfather     Hypertension Maternal Grandfather     Heart disease Paternal Grandfather     Hypertension Paternal Grandfather     Colon polyps Paternal Grandmother     Celiac disease Neg Hx     Colon cancer Neg Hx     Crohn's disease Neg Hx     Cystic fibrosis Neg Hx     Esophageal cancer Neg Hx     Irritable bowel syndrome Neg Hx     Inflammatory bowel disease Neg Hx     Liver cancer Neg Hx     Liver disease Neg Hx     Rectal cancer Neg Hx     Stomach cancer Neg Hx     Ulcerative colitis Neg Hx      Social History     Tobacco Use    Smoking status: Never Smoker    Smokeless tobacco:  Never Used   Substance Use Topics    Alcohol use: Yes     Comment: Socially, 6 beers weekly    Drug use: No     Review of Systems   Constitutional: Negative for fever.   Musculoskeletal: Positive for arthralgias and joint swelling.   Skin: Positive for wound.       Physical Exam     Initial Vitals [02/14/22 2253]   BP Pulse Resp Temp SpO2   136/73 72 16 98.9 °F (37.2 °C) 99 %      MAP       --         Physical Exam    Nursing note and vitals reviewed.  Constitutional: She appears well-developed and well-nourished. She is not diaphoretic. No distress.   HENT:   Head: Normocephalic and atraumatic.   Neck: Neck supple.   Pulmonary/Chest: No respiratory distress.   Musculoskeletal:      Cervical back: Neck supple.      Comments: R hand:  2nd digit distal phalanx region with erythema, tenderness, limited active range of motion of DIP joint.  Puncture wound noted to dorsal aspect of the digit; small puncture wound noted to volar region of digit with scant serous drainage.  No bony deformity.  Two-second cap refill.     Neurological: She is alert and oriented to person, place, and time. GCS score is 15. GCS eye subscore is 4. GCS verbal subscore is 5. GCS motor subscore is 6.   Skin: Skin is warm. Capillary refill takes less than 2 seconds.   Psychiatric: She has a normal mood and affect. Thought content normal.         ED Course   Nerve Block    Date/Time: 2/15/2022 3:05 AM  Location procedure was performed: [facility] EMERGENCY DEPARTMENT  Performed by: Raúl Rankin PA-C  Authorized by: Geovanna Bazzi MD   Indications: debridement  Body area: upper extremity  Nerve: digital  Laterality: right    Patient sedated: no  Preparation: Patient was prepped and draped in the usual sterile fashion.  Patient position: sitting  Needle size: 27 G  Location technique: anatomical landmarks  Local Anesthetic: lidocaine 1% without epinephrine  Anesthetic total: 6 mL  Complications: No  Specimens: No  Implants: No  Outcome: pain  improved  Patient tolerance: Patient tolerated the procedure well with no immediate complications        Labs Reviewed   POCT URINE PREGNANCY   POCT GLUCOSE   POCT GLUCOSE MONITORING CONTINUOUS          Imaging Results          X-Ray Finger 2 or More Views Right (Final result)  Result time 02/15/22 02:45:17    Final result by Jalil Kinney MD (02/15/22 02:45:17)                 Impression:      Nonspecific soft tissue edema of the right index finger.  No radiographic evidence of acute displaced fracture or retained radiopaque foreign body.      Electronically signed by: Jalil Kinney MD  Date:    02/15/2022  Time:    02:45             Narrative:    EXAMINATION:  XR FINGER 2 OR MORE VIEWS RIGHT    CLINICAL HISTORY:  R 2nd digit cat bite; puncture wounds; r/o FB;    TECHNIQUE:  AP, oblique, lateral views of the right fingers.    COMPARISON:  None    FINDINGS:  There is a metallic ring overlying the proximal phalanx of the 4th digit.    There is no radiographic evidence of acute displaced fracture or dislocation.  Joint spaces appear appropriately maintained.  There is nonspecific soft tissue edema involving the right index finger.  No definite retained radiopaque foreign body identified within the soft tissues.                                 Medications   amoxicillin-clavulanate 875-125mg per tablet 1 tablet (1 tablet Oral Given 2/15/22 0212)   LIDOcaine HCL 10 mg/ml (1%) injection 5 mL (5 mLs Infiltration Given 2/15/22 0212)     Medical Decision Making:   Differential Diagnosis:   Infected wound, septic arthritis, osteomyelitis, cellulitis  Clinical Tests:   Radiological Study: Ordered and Reviewed  ED Management:  Digital block, irrigate wounds copiously, placed on Augmentin, tetanus UTD, advised follow-up with PCP.  Return precautions given.                      Clinical Impression:   Final diagnoses:  [W55.01XA] Cat bite, initial encounter (Primary)          ED Disposition Condition    Discharge Stable         ED Prescriptions     Medication Sig Dispense Start Date End Date Auth. Provider    amoxicillin-clavulanate 875-125mg (AUGMENTIN) 875-125 mg per tablet Take 1 tablet by mouth 2 (two) times daily. for 10 days 20 tablet 2/15/2022 2/25/2022 Raúl Rankin PA-C        Follow-up Information     Follow up With Specialties Details Why Contact Info    North Carias Jr., MD Family Medicine Schedule an appointment as soon as possible for a visit  For wound re-check, For reevaluation [address] [facility] [id number]  [phone]             Raúl Rankin PA-C  02/15/22 0304

## 2022-04-07 DIAGNOSIS — Z12.11 COLON CANCER SCREENING: ICD-10-CM

## 2022-05-23 ENCOUNTER — LAB VISIT (OUTPATIENT)
Dept: LAB | Facility: HOSPITAL | Age: 47
End: 2022-05-23
Attending: INTERNAL MEDICINE
Payer: MEDICARE

## 2022-05-23 DIAGNOSIS — D69.9 BLEEDING DISORDER: ICD-10-CM

## 2022-05-23 DIAGNOSIS — D50.8 OTHER IRON DEFICIENCY ANEMIAS: ICD-10-CM

## 2022-05-23 LAB
ALBUMIN SERPL BCP-MCNC: 4.6 G/DL (ref 3.5–5.2)
ALP SERPL-CCNC: 37 U/L (ref 55–135)
ALT SERPL W/O P-5'-P-CCNC: 19 U/L (ref 10–44)
ANION GAP SERPL CALC-SCNC: 12 MMOL/L (ref 8–16)
AST SERPL-CCNC: 21 U/L (ref 10–40)
BASOPHILS # BLD AUTO: 0.03 K/UL (ref 0–0.2)
BASOPHILS NFR BLD: 0.3 % (ref 0–1.9)
BILIRUB SERPL-MCNC: 0.5 MG/DL (ref 0.1–1)
BUN SERPL-MCNC: 10 MG/DL (ref 6–20)
CALCIUM SERPL-MCNC: 10 MG/DL (ref 8.7–10.5)
CHLORIDE SERPL-SCNC: 100 MMOL/L (ref 95–110)
CO2 SERPL-SCNC: 27 MMOL/L (ref 23–29)
CREAT SERPL-MCNC: 0.8 MG/DL (ref 0.5–1.4)
DIFFERENTIAL METHOD: ABNORMAL
EOSINOPHIL # BLD AUTO: 0 K/UL (ref 0–0.5)
EOSINOPHIL NFR BLD: 0.4 % (ref 0–8)
ERYTHROCYTE [DISTWIDTH] IN BLOOD BY AUTOMATED COUNT: 11.2 % (ref 11.5–14.5)
EST. GFR  (AFRICAN AMERICAN): >60 ML/MIN/1.73 M^2
EST. GFR  (NON AFRICAN AMERICAN): >60 ML/MIN/1.73 M^2
FERRITIN SERPL-MCNC: 277 NG/ML (ref 20–300)
GLUCOSE SERPL-MCNC: 85 MG/DL (ref 70–110)
HCT VFR BLD AUTO: 40.6 % (ref 37–48.5)
HGB BLD-MCNC: 13.8 G/DL (ref 12–16)
IMM GRANULOCYTES # BLD AUTO: 0.03 K/UL (ref 0–0.04)
IMM GRANULOCYTES NFR BLD AUTO: 0.3 % (ref 0–0.5)
IRON SERPL-MCNC: 117 UG/DL (ref 30–160)
LYMPHOCYTES # BLD AUTO: 2.1 K/UL (ref 1–4.8)
LYMPHOCYTES NFR BLD: 22.5 % (ref 18–48)
MCH RBC QN AUTO: 31.9 PG (ref 27–31)
MCHC RBC AUTO-ENTMCNC: 34 G/DL (ref 32–36)
MCV RBC AUTO: 94 FL (ref 82–98)
MONOCYTES # BLD AUTO: 0.6 K/UL (ref 0.3–1)
MONOCYTES NFR BLD: 6 % (ref 4–15)
NEUTROPHILS # BLD AUTO: 6.5 K/UL (ref 1.8–7.7)
NEUTROPHILS NFR BLD: 70.5 % (ref 38–73)
NRBC BLD-RTO: 0 /100 WBC
PLATELET # BLD AUTO: 217 K/UL (ref 150–450)
PMV BLD AUTO: 8.9 FL (ref 9.2–12.9)
POTASSIUM SERPL-SCNC: 4.3 MMOL/L (ref 3.5–5.1)
PROT SERPL-MCNC: 8 G/DL (ref 6–8.4)
RBC # BLD AUTO: 4.33 M/UL (ref 4–5.4)
SATURATED IRON: 43 % (ref 20–50)
SODIUM SERPL-SCNC: 139 MMOL/L (ref 136–145)
TOTAL IRON BINDING CAPACITY: 272 UG/DL (ref 250–450)
TRANSFERRIN SERPL-MCNC: 184 MG/DL (ref 200–375)
WBC # BLD AUTO: 9.24 K/UL (ref 3.9–12.7)

## 2022-05-23 PROCEDURE — 82728 ASSAY OF FERRITIN: CPT | Performed by: INTERNAL MEDICINE

## 2022-05-23 PROCEDURE — 84466 ASSAY OF TRANSFERRIN: CPT | Performed by: INTERNAL MEDICINE

## 2022-05-23 PROCEDURE — 85025 COMPLETE CBC W/AUTO DIFF WBC: CPT | Performed by: INTERNAL MEDICINE

## 2022-05-23 PROCEDURE — 36415 COLL VENOUS BLD VENIPUNCTURE: CPT | Performed by: INTERNAL MEDICINE

## 2022-05-23 PROCEDURE — 80053 COMPREHEN METABOLIC PANEL: CPT | Performed by: INTERNAL MEDICINE

## 2022-05-24 ENCOUNTER — TELEPHONE (OUTPATIENT)
Dept: HEMATOLOGY/ONCOLOGY | Facility: CLINIC | Age: 47
End: 2022-05-24
Payer: MEDICARE

## 2022-05-24 NOTE — TELEPHONE ENCOUNTER
Confirmed rescheduled appointment with the patient. New virtual appt with Dr. Bobby on 5/30 at 5pm

## 2022-05-31 ENCOUNTER — PATIENT MESSAGE (OUTPATIENT)
Dept: ADMINISTRATIVE | Facility: HOSPITAL | Age: 47
End: 2022-05-31
Payer: MEDICARE

## 2022-06-30 ENCOUNTER — LAB VISIT (OUTPATIENT)
Dept: LAB | Facility: HOSPITAL | Age: 47
End: 2022-06-30
Attending: NURSE PRACTITIONER
Payer: MEDICARE

## 2022-06-30 ENCOUNTER — OFFICE VISIT (OUTPATIENT)
Dept: FAMILY MEDICINE | Facility: CLINIC | Age: 47
End: 2022-06-30
Payer: MEDICARE

## 2022-06-30 VITALS
SYSTOLIC BLOOD PRESSURE: 130 MMHG | TEMPERATURE: 98 F | WEIGHT: 164.88 LBS | OXYGEN SATURATION: 99 % | BODY MASS INDEX: 30.16 KG/M2 | DIASTOLIC BLOOD PRESSURE: 80 MMHG | HEART RATE: 78 BPM

## 2022-06-30 DIAGNOSIS — E70.30: ICD-10-CM

## 2022-06-30 DIAGNOSIS — T78.40XA ALLERGIC REACTION, INITIAL ENCOUNTER: Primary | ICD-10-CM

## 2022-06-30 DIAGNOSIS — Z12.31 ENCOUNTER FOR SCREENING MAMMOGRAM FOR BREAST CANCER: ICD-10-CM

## 2022-06-30 DIAGNOSIS — K21.9 GASTROESOPHAGEAL REFLUX DISEASE WITHOUT ESOPHAGITIS: ICD-10-CM

## 2022-06-30 LAB
CCP AB SER IA-ACNC: 1.2 U/ML
CRP SERPL-MCNC: 0.7 MG/L (ref 0–8.2)
RHEUMATOID FACT SERPL-ACNC: <13 IU/ML (ref 0–15)

## 2022-06-30 PROCEDURE — 3075F PR MOST RECENT SYSTOLIC BLOOD PRESS GE 130-139MM HG: ICD-10-PCS | Mod: CPTII,S$GLB,, | Performed by: NURSE PRACTITIONER

## 2022-06-30 PROCEDURE — 99214 PR OFFICE/OUTPT VISIT, EST, LEVL IV, 30-39 MIN: ICD-10-PCS | Mod: S$GLB,,, | Performed by: NURSE PRACTITIONER

## 2022-06-30 PROCEDURE — 3079F DIAST BP 80-89 MM HG: CPT | Mod: CPTII,S$GLB,, | Performed by: NURSE PRACTITIONER

## 2022-06-30 PROCEDURE — 99214 OFFICE O/P EST MOD 30 MIN: CPT | Mod: S$GLB,,, | Performed by: NURSE PRACTITIONER

## 2022-06-30 PROCEDURE — 3079F PR MOST RECENT DIASTOLIC BLOOD PRESSURE 80-89 MM HG: ICD-10-PCS | Mod: CPTII,S$GLB,, | Performed by: NURSE PRACTITIONER

## 2022-06-30 PROCEDURE — 99999 PR PBB SHADOW E&M-EST. PATIENT-LVL IV: ICD-10-PCS | Mod: PBBFAC,,, | Performed by: NURSE PRACTITIONER

## 2022-06-30 PROCEDURE — 3075F SYST BP GE 130 - 139MM HG: CPT | Mod: CPTII,S$GLB,, | Performed by: NURSE PRACTITIONER

## 2022-06-30 PROCEDURE — 86200 CCP ANTIBODY: CPT | Performed by: NURSE PRACTITIONER

## 2022-06-30 PROCEDURE — 3008F PR BODY MASS INDEX (BMI) DOCUMENTED: ICD-10-PCS | Mod: CPTII,S$GLB,, | Performed by: NURSE PRACTITIONER

## 2022-06-30 PROCEDURE — 36415 COLL VENOUS BLD VENIPUNCTURE: CPT | Mod: PO | Performed by: NURSE PRACTITIONER

## 2022-06-30 PROCEDURE — 86140 C-REACTIVE PROTEIN: CPT | Performed by: NURSE PRACTITIONER

## 2022-06-30 PROCEDURE — 86431 RHEUMATOID FACTOR QUANT: CPT | Performed by: NURSE PRACTITIONER

## 2022-06-30 PROCEDURE — 1159F PR MEDICATION LIST DOCUMENTED IN MEDICAL RECORD: ICD-10-PCS | Mod: CPTII,S$GLB,, | Performed by: NURSE PRACTITIONER

## 2022-06-30 PROCEDURE — 1159F MED LIST DOCD IN RCRD: CPT | Mod: CPTII,S$GLB,, | Performed by: NURSE PRACTITIONER

## 2022-06-30 PROCEDURE — 99999 PR PBB SHADOW E&M-EST. PATIENT-LVL IV: CPT | Mod: PBBFAC,,, | Performed by: NURSE PRACTITIONER

## 2022-06-30 PROCEDURE — 3008F BODY MASS INDEX DOCD: CPT | Mod: CPTII,S$GLB,, | Performed by: NURSE PRACTITIONER

## 2022-06-30 RX ORDER — METHYLPREDNISOLONE 4 MG/1
TABLET ORAL
Qty: 21 EACH | Refills: 0 | Status: SHIPPED | OUTPATIENT
Start: 2022-06-30 | End: 2022-07-21

## 2022-06-30 RX ORDER — FAMOTIDINE 10 MG/1
10 TABLET ORAL 2 TIMES DAILY
Qty: 60 TABLET | Refills: 2 | Status: SHIPPED | OUTPATIENT
Start: 2022-06-30

## 2022-06-30 NOTE — PROGRESS NOTES
"  HPI     Chief Complaint:  Chief Complaint   Patient presents with    Rash    Itching    Generalized Body Aches    Nausea    Chills       Jody Loza is a 46 y.o. female with multiple medical diagnoses as listed in the medical history and problem list that presents for "my body feels inflamed, burning in my chest, rash on both arms."  Pt is new to me but is known to this clinic with her last appointment being Visit date not found.      Rash  This is a new problem. The current episode started in the past 7 days. The problem is unchanged. Location: bilateral forearms. The rash is characterized by itchiness and redness. She was exposed to nothing. Associated symptoms include joint pain (bilateral knees). Pertinent negatives include no anorexia, congestion, cough, diarrhea, eye pain, facial edema, fatigue, fever, nail changes, rhinorrhea, shortness of breath, sore throat or vomiting. Past treatments include nothing. The treatment provided no relief. Her past medical history is significant for allergies.   Gastroesophageal Reflux  She complains of heartburn. She reports no abdominal pain, no belching, no chest pain, no choking, no coughing, no dysphagia, no early satiety, no globus sensation, no hoarse voice, no nausea, no sore throat, no stridor, no tooth decay, no water brash or no wheezing. This is a chronic problem. The current episode started more than 1 year ago. The problem occurs frequently. The problem has been gradually worsening. The heartburn duration is more than one hour. The heartburn is located in the substernum. The heartburn is of mild intensity. Pertinent negatives include no fatigue. Risk factors include obesity. She has tried a PPI (pt stopped PPI several months ago "I did not like that the medication affected absorbtion of vitamins so i stopped.") for the symptoms.      Generalized body aches/allergic reaction:  Pt reports aches throughout her body and joints. Worse upon awakening associated " "with stiffness in joints. Denies taking medication for symptoms. Denies Hx of arthritis. Reports she was recently Dx with wheat allergies which cause exacerbation of symptoms. Denies recent exposure to wheat. States she feels as if she is having "some sort of allergic reaction." Pt states she is unable to take benadryl due to sedating effects of medication.     she is compliant with medications daily without any adverse side effects.    Patient Care Team:  North Carias Jr., MD as PCP - General (Family Medicine)    History     Past Medical History:  Past Medical History:   Diagnosis Date    H/O albinism     Ulcer of abdomen wall     Vision impairment     blindness/albinism        Past Surgical History:  Past Surgical History:   Procedure Laterality Date    benign ovarian cyst      ESOPHAGOGASTRODUODENOSCOPY N/A 11/11/2019    Procedure: ESOPHAGOGASTRODUODENOSCOPY (EGD);  Surgeon: Sue Munoz MD;  Location: [facility];  Service: Endoscopy;  Laterality: N/A;    TONSILLECTOMY         Social History:  Social History     Socioeconomic History    Marital status:    Tobacco Use    Smoking status: Never Smoker    Smokeless tobacco: Never Used   Substance and Sexual Activity    Alcohol use: Yes     Comment: Socially, 6 beers weekly    Drug use: No   Social History Narrative    ** Merged History Encounter **          Social Determinants of Health     Financial Resource Strain: Low Risk     Difficulty of Paying Living Expenses: Not very hard   Food Insecurity: No Food Insecurity    Worried About Running Out of Food in the Last Year: Never true    Ran Out of Food in the Last Year: Never true   Transportation Needs: No Transportation Needs    Lack of Transportation (Medical): No    Lack of Transportation (Non-Medical): No   Physical Activity: Sufficiently Active    Days of Exercise per Week: 3 days    Minutes of Exercise per Session: 60 min   Stress: Stress Concern Present    Feeling of Stress : Rather " much   Social Connections: Unknown    Frequency of Communication with Friends and Family: Once a week    Frequency of Social Gatherings with Friends and Family: Once a week    Active Member of Clubs or Organizations: Yes    Attends Club or Organization Meetings: 1 to 4 times per year    Marital Status:    Housing Stability: Low Risk     Unable to Pay for Housing in the Last Year: No    Number of Places Lived in the Last Year: 1    Unstable Housing in the Last Year: No       Family History:  Family History   Problem Relation Age of Onset    Fibrocystic breast disease Mother     Hyperlipidemia Mother     Colon polyps Mother     Hyperlipidemia Father     Polycythemia Maternal Aunt     Heart disease Maternal Grandfather     Hypertension Maternal Grandfather     Heart disease Paternal Grandfather     Hypertension Paternal Grandfather     Colon polyps Paternal Grandmother     Celiac disease Neg Hx     Colon cancer Neg Hx     Crohn's disease Neg Hx     Cystic fibrosis Neg Hx     Esophageal cancer Neg Hx     Irritable bowel syndrome Neg Hx     Inflammatory bowel disease Neg Hx     Liver cancer Neg Hx     Liver disease Neg Hx     Rectal cancer Neg Hx     Stomach cancer Neg Hx     Ulcerative colitis Neg Hx        Allergies and Medications: (updated and reviewed)  Review of patient's allergies indicates:   Allergen Reactions    Wheat containing prod Anaphylaxis, Itching, Nausea Only, Palpitations and Shortness Of Breath     Current Outpatient Medications   Medication Sig Dispense Refill    chlorhexidine (PERIDEX) 0.12 % solution as needed.      gabapentin (NEURONTIN) 300 MG capsule Take 1 capsule (300 mg total) by mouth 3 (three) times daily. 90 capsule 0    HYDROcodone-acetaminophen (NORCO) 7.5-325 mg per tablet Take 1 tablet by mouth every 4 (four) hours as needed.      ibuprofen (ADVIL,MOTRIN) 600 MG tablet Take 600 mg by mouth every 6 (six) hours as needed.      ondansetron  (ZOFRAN-ODT) 4 MG TbDL Take 4 mg by mouth every 4 (four) hours as needed.      famotidine (PEPCID) 10 MG tablet Take 1 tablet (10 mg total) by mouth 2 (two) times daily. 60 tablet 2    meclizine (ANTIVERT) 25 mg tablet Take 1 tablet (25 mg total) by mouth 3 (three) times daily as needed for Dizziness. 60 tablet 1    methylPREDNISolone (MEDROL DOSEPACK) 4 mg tablet use as directed 21 each 0    valACYclovir (VALTREX) 1000 MG tablet Take 1 tablet (1,000 mg total) by mouth 3 (three) times daily. for 7 days 21 tablet 0     No current facility-administered medications for this visit.       Exam     Review of Systems:  (as noted above)  Review of Systems   Constitutional: Negative for diaphoresis, fatigue and fever.   HENT: Negative for congestion, hoarse voice, rhinorrhea, sore throat, trouble swallowing and voice change.    Eyes: Negative for pain and visual disturbance.   Respiratory: Negative for cough, choking, chest tightness, shortness of breath and wheezing.    Cardiovascular: Negative for chest pain and palpitations.   Gastrointestinal: Positive for heartburn. Negative for abdominal distention, abdominal pain, anal bleeding, anorexia, blood in stool, constipation, diarrhea, dysphagia, nausea, rectal pain and vomiting.        Acid reflux     Endocrine: Negative for polydipsia and polyphagia.   Genitourinary: Negative for decreased urine volume, hematuria and vaginal pain.   Musculoskeletal: Positive for arthralgias (generalized), joint pain (bilateral knees) and myalgias (generalized). Negative for neck pain.   Skin: Positive for rash. Negative for nail changes and wound.   Neurological: Negative for seizures and speech difficulty.   Psychiatric/Behavioral: Negative for confusion, decreased concentration, self-injury and suicidal ideas.       Physical Exam:   Physical Exam  Constitutional:       General: She is not in acute distress.     Appearance: She is not ill-appearing or diaphoretic.   HENT:      Head:  Normocephalic and atraumatic.   Eyes:      General: No scleral icterus.     Pupils: Pupils are equal, round, and reactive to light.   Neck:      Vascular: No carotid bruit.   Cardiovascular:      Rate and Rhythm: Normal rate and regular rhythm.      Pulses: Normal pulses.      Heart sounds: No murmur heard.    No friction rub. No gallop.   Pulmonary:      Effort: No respiratory distress.      Breath sounds: No wheezing.   Chest:      Chest wall: No tenderness.   Musculoskeletal:         General: No signs of injury.      Cervical back: No rigidity or tenderness.   Lymphadenopathy:      Cervical: No cervical adenopathy.   Skin:     Capillary Refill: Capillary refill takes 2 to 3 seconds.      Findings: Rash (see media) present.   Neurological:      Mental Status: She is alert and oriented to person, place, and time.      Cranial Nerves: No cranial nerve deficit.      Sensory: No sensory deficit.      Motor: No weakness.               Vitals:    06/30/22 1526   BP: 130/80   BP Location: Left arm   Pulse: 78   Temp: 98.2 °F (36.8 °C)   TempSrc: Oral   SpO2: 99%   Weight: 74.8 kg (164 lb 14.5 oz)      Body mass index is 30.16 kg/m².    Assessment & Plan   (all problems are new to me)      Problem List Items Addressed This Visit        GI    Gastroesophageal reflux disease without esophagitis    -continues to experience burning in chest/stomach when lying flat  -discussed with patient about avoiding potential dietary triggers  -avoid spicy/greasy/sour/acidic foods, as well as tea/coffee/chocolate if possible  -Tylenol as needed for pain, avoid NSAIDs  -Keep food diary    Pt does not want to take PPI    Start famotidine 10 mg bid    Education provided on diet.     Relevant Medications    famotidine (PEPCID) 10 MG tablet      Other Visit Diagnoses     Allergic reaction, initial encounter    -  Primary    Pt has Hx of wheat allergy. Currently experiencing pruritic rash to bilateral forearms associated erythema. Also reports  associated generalized body aches and joint pain. Reports symptoms are similar to previous allergic reaction she has experienced in the past. Denies dyspnea or difficulty swallowing. Denies taking medication for symptoms.     Advised to take zyrtec which she has at home    Avoid potential triggers    Course of medrol    Labs as below.     Discussed DDx, condition, and treatment     ED precautions given.   Notify provider if symptoms do not resolve or increase in severity.   Patient verbalizes understanding and agrees with plan of care.      Relevant Medications    methylPREDNISolone (MEDROL DOSEPACK) 4 mg tablet    [place]        Encounter for screening mammogram for breast cancer        Relevant Orders    Mammo Digital Screening Bilat    Rheumatoid Factor    CYCLIC CITRUL PEPTIDE ANTIBODY, IGG    C-REACTIVE PROTEIN          --------------------------------------------    Health Maintenance:  Health Maintenance       Date Due Completion Date    Cervical Cancer Screening Never done ---    Colorectal Cancer Screening Never done ---    Mammogram 02/26/2022 2/26/2021    Influenza Vaccine (Season Ended) 09/01/2022 ---    TETANUS VACCINE 07/30/2024 7/30/2014    Lipid Panel 02/26/2026 2/26/2021          Health maintenance reviewed. mammogram    Follow Up:  Follow up in about 2 weeks (around 7/14/2022), or if symptoms worsen or fail to improve.      The patient expressed understanding and no barriers to adherence were identified.      - The patient indicates understanding of these issues and agrees with the plan. Brief care plan is updated and reviewed with the patient as applicable.      - The patient is given an After Visit Summary that lists all medications with directions, allergies, education, orders placed during this encounter and follow-up instructions.      - I have reviewed the patient's medical information including past medical, family, and social history sections including the medications and allergies.      -  We discussed the patient's current medications.     This note was created by combination of typed  and MModal dictation.  Transcription errors may be present.  If there are any questions, please contact me.       Semaj Montiel NP

## 2022-07-07 ENCOUNTER — PES CALL (OUTPATIENT)
Dept: ADMINISTRATIVE | Facility: CLINIC | Age: 47
End: 2022-07-07
Payer: MEDICARE

## 2022-08-24 ENCOUNTER — PATIENT MESSAGE (OUTPATIENT)
Dept: ADMINISTRATIVE | Facility: HOSPITAL | Age: 47
End: 2022-08-24
Payer: MEDICARE

## 2022-10-10 ENCOUNTER — PATIENT MESSAGE (OUTPATIENT)
Dept: ADMINISTRATIVE | Facility: HOSPITAL | Age: 47
End: 2022-10-10
Payer: MEDICARE

## 2022-10-20 ENCOUNTER — PES CALL (OUTPATIENT)
Dept: ADMINISTRATIVE | Facility: OTHER | Age: 47
End: 2022-10-20
Payer: MEDICARE

## 2022-11-07 ENCOUNTER — PES CALL (OUTPATIENT)
Dept: ADMINISTRATIVE | Facility: CLINIC | Age: 47
End: 2022-11-07
Payer: MEDICARE

## 2023-01-04 ENCOUNTER — PES CALL (OUTPATIENT)
Dept: ADMINISTRATIVE | Facility: CLINIC | Age: 48
End: 2023-01-04
Payer: MEDICARE

## 2023-01-18 ENCOUNTER — PATIENT MESSAGE (OUTPATIENT)
Dept: ADMINISTRATIVE | Facility: HOSPITAL | Age: 48
End: 2023-01-18
Payer: MEDICARE

## 2023-02-24 NOTE — LETTER
"Subjective   Shanon Mackay is a 61 y.o. female.     History of Present Illness     The patient presents today for follow-up on her blood pressure, but is also having several other complaints and concerns.    She is not currently taking any medication for blood pressure. She denies any chest pain or dyspnea. She adds she needs a refill on her omeprazole and Zofran. She is coughing up mucus about half the time and she feels like there is a lump at the base of her throat. She adds it feels like something is stuck in her throat and it will not go down. The spot on her face and back has been present for at least 6 months, and it is \"weird.\" She never had problems with allergies until recently. She adds the weather causes her to sneeze and cough, but her nose does not run. She lived in [place] for a while. She was been in [place] has been 3 to 4 years. 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 She previously had problems with being diagnosed with ADD. She was on Adderall at that time.    The following portions of the patient's history were reviewed and updated as appropriate: allergies, current medications, past family history, past medical history, past social history, past surgical history, and problem list.  Past Medical History:   Diagnosis Date   • Hyperlipidemia    • Hypertension      Past Surgical History:   Procedure Laterality Date   • APPENDECTOMY     • FOOT SURGERY     • HAND SURGERY     • HYSTERECTOMY     • TONSILLECTOMY       Family History   Problem Relation Age of Onset   • Cancer Mother    • Cancer Father      Social History     Socioeconomic History   • Marital status: Single   Tobacco Use   • Smoking status: Never   • Smokeless tobacco: Never   Vaping Use   • Vaping Use: Never used   Substance and Sexual Activity   • Alcohol use: No   • Drug use: No   • Sexual activity: Yes         Current Outpatient Medications:   •  omeprazole (priLOSEC) 20 MG capsule, Take 1 " October 3, 2019      Echo Ortiz MD  [address]  [place] [address]           [facility]astroenter[facility]  [address]  [facility] [address]  Phone: [phone]  Fax: [phone]          Patient: Jody Loza   MR Number: [id number]   YOB: 1975   Date of Visit: 10/2/2019       Dear Dr. Echo Ortiz:    Thank you for referring Jody Loza to me for evaluation. Attached you will find relevant portions of my assessment and plan of care.    If you have questions, please do not hesitate to call me. I look forward to following Jody Loza along with you.    Sincerely,    Jossie Deluca, NP    Enclosure  CC:  No Recipients    If you would like to receive this communication electronically, please contact [email or web address] or [phone] to request more information on [facility] Link access.    For providers and/or their staff who would like to refer a patient to [facility], please contact us through our one-stop-shop provider referral line, [facility] , at [phone].    If you feel you have received this communication in error or would no longer like to receive these types of communications, please e-mail [email or web address]          "capsule by mouth Daily., Disp: 90 capsule, Rfl: 3  •  ondansetron (ZOFRAN) 4 MG tablet, Take 1 tablet by mouth Every 6 (Six) Hours As Needed for Nausea or Vomiting., Disp: 20 tablet, Rfl: 0  •  cetirizine (zyrTEC) 10 MG tablet, Take 1 tablet by mouth Daily., Disp: 90 tablet, Rfl: 3  •  methylphenidate LA (Ritalin LA) 20 MG 24 hr capsule, Take 1 capsule by mouth Every Morning, Disp: 30 capsule, Rfl: 0    Review of Systems   Done and noted in HPI  /82   Pulse 69   Temp 98 °F (36.7 °C) (Temporal)   Ht 162.6 cm (64.02\")   Wt 86.2 kg (190 lb)   SpO2 100%   BMI 32.59 kg/m²       Objective   Physical Exam  Vitals and nursing note reviewed.   Constitutional:       Appearance: Normal appearance. She is well-developed and well-groomed. She is obese.   HENT:      Head: Normocephalic and atraumatic.      Right Ear: Tympanic membrane and ear canal normal.      Left Ear: Tympanic membrane and ear canal normal.   Neck:      Vascular: No carotid bruit.     Cardiovascular:      Rate and Rhythm: Normal rate and regular rhythm.      Pulses: Normal pulses.      Heart sounds: Normal heart sounds.   Pulmonary:      Effort: Pulmonary effort is normal.      Breath sounds: Normal breath sounds.   Musculoskeletal:      Cervical back: Normal range of motion and neck supple.      Right lower leg: No edema.      Left lower leg: No edema.   Lymphadenopathy:      Cervical: Cervical adenopathy present.      Left cervical: Posterior cervical adenopathy: shotty RAC.      Upper Body:      Right upper body: No supraclavicular adenopathy.      Left upper body: No supraclavicular adenopathy.   Skin:     General: Skin is warm and dry.      Findings: No lesion or rash.          Neurological:      General: No focal deficit present.      Mental Status: She is alert.   Psychiatric:         Attention and Perception: Attention normal.         Mood and Affect: Mood normal.         Behavior: Behavior is cooperative.     Assessment hypertension HD " with 6 out of 6 in part a and O12 on part.  A 20 g daily urine drug screen urine contract this next is massive sitting for CBC TSH CT soft tissue neck  Skin lesion of cheek consider dermatology for skin back to dermatology was concerned that the 1 on her cheek is a basal cell carcinoma the one on her back as a squamous cell carcinoma  Seasonal allergies have started her on Zyrtec next for her GERD I refilled her prescriptions for omeprazole and Zofran I will see her back in a year 40 minutes were spent reviewing her chart, taking history and doing exam, counseling the patient, ordering tests, medications, referrals completing her chart      Assessment & Plan   Problems Addressed this Visit        Allergies and Adverse Reactions    Seasonal allergies    Relevant Medications    cetirizine (zyrTEC) 10 MG tablet       Cardiac and Vasculature    Hypertension - Primary    Relevant Orders    Basic metabolic panel    CBC & Differential       Gastrointestinal Abdominal     GERD (gastroesophageal reflux disease)    Relevant Medications    ondansetron (ZOFRAN) 4 MG tablet    omeprazole (priLOSEC) 20 MG capsule   Other Visit Diagnoses     ADHD (attention deficit hyperactivity disorder), combined type        Relevant Medications    methylphenidate LA (Ritalin LA) 20 MG 24 hr capsule    Other Relevant Orders    CBC & Differential    Neck mass        Relevant Orders    CBC & Differential    TSH    CT Soft Tissue Neck With Contrast    Skin lesion of cheek        Relevant Orders    Ambulatory Referral to Dermatology    Skin lesion of back        Relevant Orders    Ambulatory Referral to Dermatology      Diagnoses       Codes Comments    Primary hypertension    -  Primary ICD-10-CM: I10  ICD-9-CM: 401.9     ADHD (attention deficit hyperactivity disorder), combined type     ICD-10-CM: F90.2  ICD-9-CM: 314.01     Neck mass     ICD-10-CM: R22.1  ICD-9-CM: 784.2     Skin lesion of cheek     ICD-10-CM: L98.9  ICD-9-CM: 709.9     Skin  lesion of back     ICD-10-CM: L98.9  ICD-9-CM: 709.9     Seasonal allergies     ICD-10-CM: J30.2  ICD-9-CM: 477.9     Gastroesophageal reflux disease, unspecified whether esophagitis present     ICD-10-CM: K21.9  ICD-9-CM: 530.81         1. Primary hypertension  Resolved.    2. ADHD  With 6 out of 6 in part A and 11 out of 12 in part B. I will start her on Ritalin LA 20 mg daily. Urine drug screen and urine drug contract were done here in the office.    3. Neck mass  I am sending her for a CBC, TSH, and a CT soft tissue neck.    4. Skin lesion of her cheek  I am sending her to dermatology.    5. Skin lesion of her back  I am sending her to dermatology. I was concerned that the one on her cheek is a basal cell carcinoma. The one on her back is a squamous cell carcinoma.    6. Seasonal allergies  I have started her on Zyrtec.    7. GERD  I have refilled her prescriptions for omeprazole and Zofran. I will see her back in a year.    40 minutes were spent reviewing her chart, taking history and doing exam, counseling the patient, ordering tests, medications, referrals, and completing her chart.      Transcribed from ambient dictation for Phoebe Antoine MD by Mary Madsen.  02/24/23   09:36 EST    Patient or patient representative verbalized consent to the visit recording.  I have personally performed the services described in this document as transcribed by the above individual, and it is both accurate and complete.

## 2023-03-08 ENCOUNTER — PATIENT OUTREACH (OUTPATIENT)
Dept: ADMINISTRATIVE | Facility: HOSPITAL | Age: 48
End: 2023-03-08
Payer: MEDICARE

## 2023-03-08 ENCOUNTER — PATIENT MESSAGE (OUTPATIENT)
Dept: ADMINISTRATIVE | Facility: HOSPITAL | Age: 48
End: 2023-03-08
Payer: MEDICARE

## 2023-03-08 DIAGNOSIS — R73.03 PREDIABETES: Primary | ICD-10-CM

## 2023-04-12 ENCOUNTER — PATIENT MESSAGE (OUTPATIENT)
Dept: ADMINISTRATIVE | Facility: HOSPITAL | Age: 48
End: 2023-04-12
Payer: MEDICARE